# Patient Record
Sex: MALE | Race: WHITE | NOT HISPANIC OR LATINO | Employment: OTHER | URBAN - NONMETROPOLITAN AREA
[De-identification: names, ages, dates, MRNs, and addresses within clinical notes are randomized per-mention and may not be internally consistent; named-entity substitution may affect disease eponyms.]

---

## 2021-12-15 ENCOUNTER — OFFICE VISIT (OUTPATIENT)
Dept: OTOLARYNGOLOGY | Facility: CLINIC | Age: 71
End: 2021-12-15

## 2021-12-15 VITALS
HEART RATE: 86 BPM | SYSTOLIC BLOOD PRESSURE: 112 MMHG | WEIGHT: 167 LBS | TEMPERATURE: 98.9 F | DIASTOLIC BLOOD PRESSURE: 62 MMHG

## 2021-12-15 DIAGNOSIS — R63.4 ABNORMAL WEIGHT LOSS: ICD-10-CM

## 2021-12-15 DIAGNOSIS — C79.89 METASTATIC SQUAMOUS CELL CARCINOMA TO HEAD AND NECK (HCC): ICD-10-CM

## 2021-12-15 DIAGNOSIS — Z87.891 FORMER SMOKER: ICD-10-CM

## 2021-12-15 DIAGNOSIS — J34.2 ACQUIRED DEVIATED NASAL SEPTUM: ICD-10-CM

## 2021-12-15 DIAGNOSIS — C32.1 SQUAMOUS CELL CANCER OF EPIGLOTTIS (HCC): ICD-10-CM

## 2021-12-15 DIAGNOSIS — R22.1 MASS OF LEFT SIDE OF NECK: Primary | ICD-10-CM

## 2021-12-15 PROCEDURE — 88305 TISSUE EXAM BY PATHOLOGIST: CPT | Performed by: OTOLARYNGOLOGY

## 2021-12-15 PROCEDURE — 31575 DIAGNOSTIC LARYNGOSCOPY: CPT | Performed by: OTOLARYNGOLOGY

## 2021-12-15 PROCEDURE — 88112 CYTOPATH CELL ENHANCE TECH: CPT | Performed by: OTOLARYNGOLOGY

## 2021-12-15 PROCEDURE — 10021 FNA BX W/O IMG GDN 1ST LES: CPT | Performed by: OTOLARYNGOLOGY

## 2021-12-15 PROCEDURE — 99204 OFFICE O/P NEW MOD 45 MIN: CPT | Performed by: OTOLARYNGOLOGY

## 2021-12-15 RX ORDER — IPRATROPIUM BROMIDE 42 UG/1
2 SPRAY, METERED NASAL 4 TIMES DAILY PRN
Qty: 45 ML | Refills: 3 | Status: SHIPPED | OUTPATIENT
Start: 2021-12-15 | End: 2022-01-01

## 2021-12-15 NOTE — PROGRESS NOTES
Robert Ayers Jr, MD  [facility] EAR NOSE & THROAT  [address], [address]  [place] [facility] [address]  Fax [phone]  Phone [phone]      Visit Type: NEW PATIENT   Chief Complaint   Patient presents with   • Sinus Problem        HPI  New Patient  Accompanied by:  No one  Enrique Coronado is a  71 y.o. male with neck mass. He was referred. He has had sinus since 4/2021. He says he had drainage. He was unable to eat..  He has lost 60 lbs. He noted since May.  He says his nose bled as well.  He is unaware of neck mass.  Smoke- quit 7 months ago.  He has had CT at [facility].      No surgery found, No surgery found       History     Last Reviewed by Robert Ayers Jr., MD on 12/15/2021 at  2:57 PM    Sections Reviewed    Medical, Family, Tobacco, Surgical      Problem list reviewed by Robert Ayers Jr., MD on 12/15/2021 at  2:57 PM  Medicines reviewed by Robert Ayers Jr., MD on 12/15/2021 at  2:57 PM  Allergies reviewed by Robert Ayers Jr., MD on 12/15/2021 at  2:57 PM        Vital Signs:   Temp:  [98.9 °F (37.2 °C)] 98.9 °F (37.2 °C)  Heart Rate:  [86] 86  BP: (112)/(62) 112/62  ENT Physical Exam  Constitutional  Appearance: patient appears well-developed, well-nourished and well-groomed,  Communication/Voice: communication appropriate for developmental age; vocal quality normal;  Head and Face  Appearance: head appears normal, face appears normal and face appears atraumatic;  Palpation: facial palpation normal;  Salivary: glands normal;  Ear  Hearing: impaired to conversational voice;  Auricles: bilateral auricles normal;  External Mastoids: bilateral external mastoids normal;  Ear Canals: bilateral ear canals normal;  Tympanic Membranes: bilateral tympanic membranes normal;  Nose  External Nose: nares patent bilaterally; external nose normal;  Oral Cavity/Oropharynx  Lips: normal;  Teeth: missing teeth  (Edentulous upper and lower) and dentures (upper) noted;  Gums: gingiva normal;  Tongue: normal;  Oral mucosa: normal;  Hard palate: normal;  Soft palate: normal;  Tonsils: right tonsil fossa well-healed, bilateral tonsils absent,  Base of Tongue: normal; with no lesion present; Base of Tongue comments: palpation  Posterior pharyngeal wall: no lesion or mass noted;  Neck  Neck: neck normal;  Respiratory  Inspection: breathing unlabored; normal breathing rate;  Cardiovascular  Inspection: extremities are warm and well perfused; no peripheral edema present;  Lymphatic  Palpation: single left cervical lymph node present;  Lymphatic comments: Left Level 3 3 cm firm, immobile mass  Neurovestibular  Mental Status: alert and oriented;  Psychiatric: mood normal; affect is appropriate;  Cranial Nerves: cranial nerves intact;  Drawings       Laryngoscopy    Date/Time: 12/15/2021 2:48 PM  Performed by: Robert Ayers Jr., MD  Authorized by: Robert Ayers Jr., MD     Consent:     Consent obtained:  Verbal    Consent given by:  Patient  Anesthesia (see MAR for exact dosages):     Anesthesia method:  Topical application    Topical anesthetic:  Tetracaine  Procedure details:     Indications: hoarseness, dysphagia, or aspiration      Medication:  Afrin    Scope location: left nare    Sinus/ Nasopharynx:     Right nasopharynx: normal and patent      Left nasopharynx: normal and patent      Right eustachian tube: normal and patent      Left eustachian tube: normal and patent    Oropharynx/ Supraglottis:     Posterior pharyngeal wall: inflamed      Oropharynx: inflammation       comment:  Lateral walls redundant, red, mildly wet    Vallecula: inflammation       comment:  Pooling, erythema, questionable lesion in the left inferior vallecula adjacent to the epiglottis    Base of tongue: lingual tonsillar hypertrophy (Moderate, wet)       comment:  Prolapse, moderate    Epiglottis: lesions (Granulated lesion involving the  "lingual and laryngeal surface thickening the epiglottis, extending down the epiglottis into the petiole area and onto the anterior false cords)    Larynx/ Hypopharynx:     Arytenoids: inflammation (Red, wet, towers enlarged) and interarytenoid edema      Arytenoids: no lesions      Hypopharynx: inflammation (Lateral walls redundant, pooling of secretions)      Pyriform sinus: inflammation (Pooling of secretions)      False vocal cords: lesion (Bilateral very anterior vocal cord granulation from the lesion noted at the petiole of the epiglottis) and inflammation      True vocal cords: reduced mobility       comment:  Secretions penetrating the glottis  Post-procedure details:     Patient tolerance of procedure:  Tolerated well  Comments:      Epiglottic lesion on both services with edema of the epiglottis.    Fine needle aspiration    Date/Time: 12/17/2021 4:39 PM  Performed by: Robert Ayers Jr., MD  Authorized by: Robert Ayers Jr., MD   Consent: Verbal consent obtained. Written consent obtained.  Risks and benefits: risks, benefits and alternatives were discussed  Consent given by: patient  Site marked: the operative site was marked  Imaging studies: imaging studies available  Time out: Immediately prior to procedure a \"time out\" was called to verify the correct patient, procedure, equipment, support staff and site/side marked as required.  Preparation: Patient was prepped and draped in the usual sterile fashion.  Local anesthesia used: yes  Anesthesia: local infiltration    Anesthesia:  Local anesthesia used: yes  Local Anesthetic: lidocaine 1% with epinephrine  Anesthetic total: 2 mL    Sedation:  Patient sedated: no    Patient tolerance: patient tolerated the procedure well with no immediate complications  Comments: Fine-needle aspiration x2 passes in the left neck area  Findings of pus-like appearance of aspirate, 2 mL  Left neck mass much smaller after aspiration         Result Review  "   RESULTS REVIEW    I have reviewed the patients old records in the chart.   I have reviewed the patients old records in the chart.  The following results/records were reviewed:   REFERRAL/PRE-AUTH MRN - SCAN - ENT REFERRAL RECORDS (12/14/2021)  CT disc brought from [facility]- reviewed and agree with interpretation      Assessment/Plan    Diagnoses and all orders for this visit:    1. 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 Acquired deviated nasal septum  Comments:  R to L low moderate  L to R mid mod to severe    4. Abnormal weight loss    5. Former smoker    6. 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 For drainage  Dispense: 45 mL; Refill: 3       Medical and surgical options were discussed including medical and surgical options. Risks, benefits and alternatives were discussed and questions were answered. After considering the options, the patient decided to proceed with surgery.     -----SURGERY SCHEDULING:-----  Schedule DIRECT LARYNGOSCOPY, ESOPHAGOSCOPY, BRONCHOSCOPY (Bilateral), MICRODIRECT LARYNGOSCOPY WITH/WITHOUT LASER (Bilateral)    ---INFORMED CONSENT DISCUSSION:---  DIRECT LARYNGOSCOPY WITH BIOPSY: The risks and benefits were explained including  but not limited to pain, bleeding, infection, (including possible mediastinitis), the risks of the general anesthesia, pain, temporary or permanent hoarseness, airway loss, and/or tooth injury. Questions were answered. No guarantees were made or implied.      ---PREOPERATIVE WORKUP:---  labs/ workup per anesthesia  Preoperative Medicine evaluation for clearance- Dr Graham  Preoperative Anesthesia evaluation       Patient has an epiglottic lesion causing pooling and aspiration.  I fear this is cancer.  I discussed my concerns with the patient.  I feel like patient needs a biopsy and panendoscopy.  I have discussed risk, benefits, alternative treatments, options.  The patient appears to understand and wishes to proceed.  Plan endoscopy        Encouraged to enroll in My Chart  Encouraged to review data and findings in My Chart    Patient understands plan as described and wishes to proceed.  Return RTC 2 weeks after surgery, for Recheck throat.      Robert Ayers Jr, MD  12/15/21  15:04 CST

## 2021-12-15 NOTE — PATIENT INSTRUCTIONS
PREOPERATIVE SURGERY/PROCEDURE INSTRUCTIONS:  • Do not eat or drink ANYTHING after midnight, unless instructed   • Clean the operative site by showering with an antibacterial soap (like Dial, Dove, Ivory, etc) and shampooing hair  • Preoperative scrub for Surgery:   Skin: Antibacterial soap (Dial, Ivory, Dove) shower daily, including hair.  Be careful not to get into eyes  Do this daily for 5 days  • Mouth: Betadine solution 3 times daily for 5 days  • Do NOT pluck, shave hair on skin the night prior to operation  • If you are diabetic, take your blood sugar the night before and in the morning prior to coming to hospital and give results to nurse and the anesthesiologist    ENT PREOPERATIVE PROTOCOL FOR SURGERY: Begin after COVID test has been performed  After test performed, PLEASE self-quarantine to prevent possible infection!! And start below  Betadine rinses:  • Use Betadine rinse in the nose  • Spray twice in each nostril 3 times a day beginning 3 days before surgery  • Spray nose the morning of surgery, bring with you to the operating room  • Use Betadine rinse in the mouth  • Gargle, swish and spit 15 ml in mouth and rinse around teeth 3 times daily beginning 3 days prior to surgery  • Repeat morning of surgery before arriving at hospital  Betadine rinse can be prescribed at the [facility]    Betadine Iodine mixture Recipe:  • Neilmed bottle from pharmacy  • Use [employer] Med packet that comes with the bottle  • Add Betadine/Povidone 10 ml (2 tsp) to the bottle  • Add Remigio baby shampoo 2.5 ml (½ tsp) to the bottle  • Fill bottle with distilled water (from grocery store)    DO NOT USE IF IODINE/BETADINE ALLERGIC, OR HISTORY OF THYROID PROBLEMS/THYROID CANCER    Non Betadine rinses:  • Nasal rinses:  • Use rinse in the nose  • Spray twice in each nostril 3 times a day beginning 3 days before surgery  • Spray nose the morning of surgery, bring with you to the operating room  • Use Same rinse in  the mouth  • Gargle, swish and spit 15 ml in mouth and rinse around teeth 3 times daily beginning 3 days prior to surgery  • Repeat morning of surgery before arriving at hospital    Nasal mixture Recipe:  • Neilmed bottle from pharmacy  • Use Allan Med packet that comes with the bottle  • Add Remigio baby shampoo 2.5 ml (½ tsp) to the bottle  • Fill bottle with distilled water (from grocery store)    Remove any metallic piercings prior to surgery. You may wear plastic spacers if needed.    Do NOT apply eye makeup Morning of surgery    Please remove fingernail polish prior to surgery    STOP:  -   All natural/homeopathic medications 2 weeks prior to surgery, Ask about over the counter medications  -   Smoking 2 weeks prior to surgery  -   Blood thinners- 3-5 days prior to surgery (or as instructed by doctor)  Bring with you the morning of surgery:  -   Preoperative paperwork  -   Insurance card  -   Identification with photo  -   Home medications or up to date list    Robert Ayers Jr, MD has explained the risks, benefits and alternatives to the patient/patient’s representative, in clear and simple language.  Time was allowed for questions.  Risks of procedure include but are not limited to:    As a result of this procedure being performed, the material risks generally recognized are INFECTION, ALLERGIC REACTION, SEVERE LOSS OF BLOOD, LOSS OR LOSS OF FUNCTION OF ANY LIMB OR ORGAN, PARALYSIS OR PARTIAL PARALYSIS, PARAPLEGIA OR QUADRIPLEGIA, DISFIGURING SCAR, BRAIN DAMAGE, CARDIAC ARREST OR DEATH, BLOOD LOSS NECESSITATING TRANSFUSION WHICH CARRIES THE RISK OF EXPOSURE TO AIDS, HEPATITIS OR OTHER INFECTIOUS DISEASES.      Procedure: Direct laryngoscopy, Esophagoscopy, Bronchoscopy, Microlaryngoscopy, Possible laser use (any or all of the above)    Risks specific for procedure: Perforation of the upper and lower aerodigestive tract including esophagus and trachea, loss of airway, permanent damage to voice and/or  swallowing, loss of voice, need for tracheostomy, fire in the airway, scarring and stenosis    No guarantees of outcome given or implied  Patient demonstrate understanding    Ipratromium Bromide (Atrovent) spray use 1-2 puffs each nostril 3-4 times daily AS needed for drainage    Patient does wish to  proceed with proposed procedure    CONTACT INFORMATION:  The main office phone number is [phone]. For emergencies after hours and on weekends, this number will convert over to our answering service and the on call provider will answer. Please try to keep non emergent phone calls/ questions to office hours 9am-5pm Monday through Friday.     [employer]  As an alternative, you can sign up and use the Epic MyChart system for more direct and quicker access for non emergent questions/ problems.  [employer] allows you to send messages to your doctor, view your test results, renew your prescriptions, schedule appointments, and more. To sign up, go to [employer] and click on the Sign Up Now link in the New User? box. Enter your Private[employer] Activation Code exactly as it appears below along with the last four digits of your Social Security Number and your Date of Birth () to complete the sign-up process. If you do not sign up before the expiration date, you must request a new code.    Private[employer] Activation Code: [email or web address]  Expires: 2022  7:15 AM    If you have questions, you can email [email or web address] or call [phone] to talk to our [employer] staff. Remember, Private[employer] is NOT to be used for urgent needs. For medical emergencies, dial 911.

## 2021-12-16 ENCOUNTER — TELEPHONE (OUTPATIENT)
Dept: OTOLARYNGOLOGY | Facility: CLINIC | Age: 71
End: 2021-12-16

## 2021-12-16 NOTE — TELEPHONE ENCOUNTER
I spoke to patient's doctor's office, Odessa Graham about scheduling patient for a pre op clearance for a Pandoscopy for Dr. Ayers. Their office will contact the patient to schedule an appointment. I have faxed paperwork for the clearance to their office.

## 2021-12-20 PROBLEM — C32.1 SQUAMOUS CELL CANCER OF EPIGLOTTIS (HCC): Status: ACTIVE | Noted: 2021-12-20

## 2021-12-20 PROBLEM — R22.1 MASS OF LEFT SIDE OF NECK: Status: ACTIVE | Noted: 2021-12-20

## 2021-12-20 LAB
CYTO UR: NORMAL
LAB AP CASE REPORT: NORMAL
LAB AP CLINICAL INFORMATION: NORMAL
PATH REPORT.FINAL DX SPEC: NORMAL
PATH REPORT.GROSS SPEC: NORMAL

## 2021-12-23 ENCOUNTER — PRE-ADMISSION TESTING (OUTPATIENT)
Dept: PREADMISSION TESTING | Facility: HOSPITAL | Age: 71
End: 2021-12-23

## 2021-12-23 ENCOUNTER — LAB (OUTPATIENT)
Dept: LAB | Facility: HOSPITAL | Age: 71
End: 2021-12-23

## 2021-12-23 VITALS
OXYGEN SATURATION: 97 % | SYSTOLIC BLOOD PRESSURE: 135 MMHG | WEIGHT: 164.68 LBS | BODY MASS INDEX: 24.39 KG/M2 | HEIGHT: 69 IN | RESPIRATION RATE: 16 BRPM | DIASTOLIC BLOOD PRESSURE: 73 MMHG | HEART RATE: 80 BPM

## 2021-12-23 DIAGNOSIS — R22.1 MASS OF LEFT SIDE OF NECK: ICD-10-CM

## 2021-12-23 DIAGNOSIS — C32.1 SQUAMOUS CELL CANCER OF EPIGLOTTIS (HCC): ICD-10-CM

## 2021-12-23 LAB
DEPRECATED RDW RBC AUTO: 51.9 FL (ref 37–54)
ERYTHROCYTE [DISTWIDTH] IN BLOOD BY AUTOMATED COUNT: 15.5 % (ref 12.3–15.4)
HCT VFR BLD AUTO: 47.8 % (ref 37.5–51)
HGB BLD-MCNC: 15.4 G/DL (ref 13–17.7)
MCH RBC QN AUTO: 29.2 PG (ref 26.6–33)
MCHC RBC AUTO-ENTMCNC: 32.2 G/DL (ref 31.5–35.7)
MCV RBC AUTO: 90.5 FL (ref 79–97)
PLATELET # BLD AUTO: 194 10*3/MM3 (ref 140–450)
PMV BLD AUTO: 10.1 FL (ref 6–12)
RBC # BLD AUTO: 5.28 10*6/MM3 (ref 4.14–5.8)
SARS-COV-2 ORF1AB RESP QL NAA+PROBE: NOT DETECTED
WBC NRBC COR # BLD: 8.11 10*3/MM3 (ref 3.4–10.8)

## 2021-12-23 PROCEDURE — U0004 COV-19 TEST NON-CDC HGH THRU: HCPCS

## 2021-12-23 PROCEDURE — 36415 COLL VENOUS BLD VENIPUNCTURE: CPT

## 2021-12-23 PROCEDURE — U0005 INFEC AGEN DETEC AMPLI PROBE: HCPCS

## 2021-12-23 PROCEDURE — 85027 COMPLETE CBC AUTOMATED: CPT

## 2021-12-23 PROCEDURE — C9803 HOPD COVID-19 SPEC COLLECT: HCPCS

## 2021-12-23 NOTE — DISCHARGE INSTRUCTIONS
DAY OF SURGERY INSTRUCTIONS          ARRIVAL TIME: AS DIRECTED BY OFFICE    YOU MAY TAKE THE FOLLOWING MEDICATION(S) THE MORNING OF SURGERY WITH A SIP OF WATER: NONE    ALL OTHER HOME MEDICATIONS CHECK WITH YOUR DOCTOR    DO NOT TAKE ANY ERECTILE DYSFUNCTION MEDICATIONS (EX:  CIALIS, VIAGRA) 24 HOURS PRIOR TO SURGERY              MANAGING PAIN AFTER SURGERY    We know you are probably wondering what your pain will be like after surgery.  Following surgery it is unrealistic to expect you will not have pain.   Pain is how our bodies let us know that something is wrong or cautions us to be careful.  That said, our goal is to make your pain tolerable.    Methods we may use to treat your pain include (oral or IV medications, PCAs, epidurals, nerve blocks, etc.)   While some procedures require IV pain medications for a short time after surgery, transitioning to pain medications by mouth allows for better management of pain.   Your nurse will encourage you to take oral pain medications whenever possible.  IV medications work almost immediately, but only last a short while.  Taking medications by mouth allows for a more constant level of medication in your blood stream for a longer period of time.      Once your pain is out of control it is harder to get back under control.  It is important you are aware when your next dose of pain medication is due.  If you are admitted, your nurse may write the time of your next dose on the white board in your room to help you remember.      We are interested in your pain and encourage you to inform us about aggravating factors during your visit.   Many times a simple repositioning every few hours can make a big difference.    If your physician says it is okay, do not let your pain prevent you from getting out of bed. Be sure to call your nurse for assistance prior to getting up so you do not fall.      Before surgery, please decide your tolerable pain goal.  These faces help describe the  pain ratings we use on a 0-10 scale.   Be prepared to tell us your goal and whether or not you take pain or anxiety medications at home.      BEFORE YOU COME TO THE HOSPITAL  (Pre-op instructions)  • Do not eat, drink, smoke or chew gum after midnight the night before surgery.  This also includes no mints.  • Morning of surgery take only the medicines you have been instructed with a sip of water unless otherwise instructed  by your physician.  • Do not shave, wear makeup or dark nail polish.  • Remove all jewelry including rings.  • Leave anything you consider valuable at home.  • Leave your suitcase in the car until after your surgery.  • Bring the following with you if applicable:  o Picture ID and insurance, Medicare or Medicaid cards  o Co-pay/deductible required by insurance (cash, check, credit card)  o Copy of advance directive, living will or power-of- documents if not brought to Pre-work  o CPAP or BIPAP mask and tubing  o Relaxation aids ( book, magazine), etc.  o Hearing aids                                 ON THE DAY OF SURGERY  · On the day of surgery check in at registration located at the main entrance of the hospital. Only one family member or friend are allowed per patient.  ? You will be registered and given a beeper with instructions where to wait in the main lobby.  ? When your beeper lights up and vibrates a member of the Outpatient Surgery staff will meet you at the double doors under the stair steps and escort you to your preoperative room.   · You may have cloth compression devices placed on your legs. These help to prevent blood clots and reduce swelling in your legs.  · An IV may be inserted into one of your veins.  · In the operating room, you may be given one or more of the following:  ? A medicine to help you relax (sedative).  ? A medicine to numb the area (local anesthetic).  ? A medicine to make you fall asleep (general anesthetic).  ? A medicine that is injected into an area  "of your body to numb everything below the injection site (regional anesthetic).  · Your surgical site will be marked or identified.  · You may be given an antibiotic through your IV to help prevent infection.  Contact a health care provider if you:  · Develop a fever of more than 100.4°F (38°C) or other feelings of illness during the 48 hours before your surgery.  · Have symptoms that get worse.  Have questions or concerns about your surgery    General Anesthesia/Surgery, Adult  General anesthesia is the use of medicines to make a person \"go to sleep\" (unconscious) for a medical procedure. General anesthesia must be used for certain procedures, and is often recommended for procedures that:  · Last a long time.  · Require you to be still or in an unusual position.  · Are major and can cause blood loss.  The medicines used for general anesthesia are called general anesthetics. As well as making you unconscious for a certain amount of time, these medicines:  · Prevent pain.  · Control your blood pressure.  · Relax your muscles.  Tell a health care provider about:  · Any allergies you have.  · All medicines you are taking, including vitamins, herbs, eye drops, creams, and over-the-counter medicines.  · Any problems you or family members have had with anesthetic medicines.  · Types of anesthetics you have had in the past.  · Any blood disorders you have.  · Any surgeries you have had.  · Any medical conditions you have.  · Any recent upper respiratory, chest, or ear infections.  · Any history of:  ? Heart or lung conditions, such as heart failure, sleep apnea, asthma, or chronic obstructive pulmonary disease (COPD).  ?  service.  ? Depression or anxiety.  · Any tobacco or drug use, including marijuana or alcohol use.  · Whether you are pregnant or may be pregnant.  What are the risks?  Generally, this is a safe procedure. However, problems may occur, including:  · Allergic reaction.  · Lung and heart " problems.  · Inhaling food or liquid from the stomach into the lungs (aspiration).  · Nerve injury.  · Air in the bloodstream, which can lead to stroke.  · Extreme agitation or confusion (delirium) when you wake up from the anesthetic.  · Waking up during your procedure and being unable to move. This is rare.  These problems are more likely to develop if you are having a major surgery or if you have an advanced or serious medical condition. You can prevent some of these complications by answering all of your health care provider's questions thoroughly and by following all instructions before your procedure.  General anesthesia can cause side effects, including:  · Nausea or vomiting.  · A sore throat from the breathing tube.  · Hoarseness.  · Wheezing or coughing.  · Shaking chills.  · Tiredness.  · Body aches.  · Anxiety.  · Sleepiness or drowsiness.  · Confusion or agitation.  RISKS AND COMPLICATIONS OF SURGERY  Your health care provider will discuss possible risks and complications with you before surgery. Common risks and complications include:    · Problems due to the use of anesthetics.  · Blood loss and replacement (does not apply to minor surgical procedures).  · Temporary increase in pain due to surgery.  · Uncorrected pain or problems that the surgery was meant to correct.  · Infection.  · New damage.    What happens before the procedure?    Medicines  Ask your health care provider about:  · Changing or stopping your regular medicines. This is especially important if you are taking diabetes medicines or blood thinners.  · Taking medicines such as aspirin and ibuprofen. These medicines can thin your blood. Do not take these medicines unless your health care provider tells you to take them.  · Taking over-the-counter medicines, vitamins, herbs, and supplements. Do not take these during the week before your procedure unless your health care provider approves them.  General instructions  · Starting 3-6 weeks  before the procedure, do not use any products that contain nicotine or tobacco, such as cigarettes and e-cigarettes. If you need help quitting, ask your health care provider.  · If you brush your teeth on the morning of the procedure, make sure to spit out all of the toothpaste.  · Tell your health care provider if you become ill or develop a cold, cough, or fever.  · If instructed by your health care provider, bring your sleep apnea device with you on the day of your surgery (if applicable).  · Ask your health care provider if you will be going home the same day, the following day, or after a longer hospital stay.  ? Plan to have someone take you home from the hospital or clinic.  ? Plan to have a responsible adult care for you for at least 24 hours after you leave the hospital or clinic. This is important.  What happens during the procedure?  · You will be given anesthetics through both of the following:  ? A mask placed over your nose and mouth.  ? An IV in one of your veins.  · You may receive a medicine to help you relax (sedative).  · After you are unconscious, a breathing tube may be inserted down your throat to help you breathe. This will be removed before you wake up.  · An anesthesia specialist will stay with you throughout your procedure. He or she will:  ? Keep you comfortable and safe by continuing to give you medicines and adjusting the amount of medicine that you get.  ? Monitor your blood pressure, pulse, and oxygen levels to make sure that the anesthetics do not cause any problems.  The procedure may vary among health care providers and hospitals.  What happens after the procedure?  · Your blood pressure, temperature, heart rate, breathing rate, and blood oxygen level will be monitored until the medicines you were given have worn off.  · You will wake up in a recovery area. You may wake up slowly.  · If you feel anxious or agitated, you may be given medicine to help you calm down.  · If you will be  going home the same day, your health care provider may check to make sure you can walk, drink, and urinate.  · Your health care provider will treat any pain or side effects you have before you go home.  · Do not drive for 24 hours if you were given a sedative.  Summary  · General anesthesia is used to keep you still and prevent pain during a procedure.  · It is important to tell your healthcare provider about your medical history and any surgeries you have had, and previous experience with anesthesia.  · Follow your healthcare provider’s instructions about when to stop eating, drinking, or taking certain medicines before your procedure.  · Plan to have someone take you home from the hospital or clinic.  This information is not intended to replace advice given to you by your health care provider. Make sure you discuss any questions you have with your health care provider.  Document Released: 03/26/2009 Document Revised: 08/03/2018 Document Reviewed: 08/03/2018  [employer] Interactive Patient Education © 2019 [employer]      Fall Prevention in Hospitals, Adult  As a hospital patient, your condition and the treatments you receive can increase your risk for falls. Some additional risk factors for falls in a hospital include:  · Being in an unfamiliar environment.  · Being on bed rest.  · Your surgery.  · Taking certain medicines.  · Your tubing requirements, such as intravenous (IV) therapy or catheters.  It is important that you learn how to decrease fall risks while at the hospital. Below are important tips that can help prevent falls.  SAFETY TIPS FOR PREVENTING FALLS  Talk about your risk of falling.  · Ask your health care provider why you are at risk for falling. Is it your medicine, illness, tubing placement, or something else?  · Make a plan with your health care provider to keep you safe from falls.  · Ask your health care provider or pharmacist about side effects of your medicines. Some medicines can make you  dizzy or affect your coordination.  Ask for help.  · Ask for help before getting out of bed. You may need to press your call button.  · Ask for assistance in getting safely to the toilet.  · Ask for a walker or cane to be put at your bedside. Ask that most of the side rails on your bed be placed up before your health care provider leaves the room.  · Ask family or friends to sit with you.  · Ask for things that are out of your reach, such as your glasses, hearing aids, telephone, bedside table, or call button.  Follow these tips to avoid falling:  · Stay lying or seated, rather than standing, while waiting for help.  · Wear rubber-soled slippers or shoes whenever you walk in the hospital.  · Avoid quick, sudden movements.  ¨ Change positions slowly.  ¨ Sit on the side of your bed before standing.  ¨ Stand up slowly and wait before you start to walk.  · Let your health care provider know if there is a spill on the floor.  · Pay careful attention to the medical equipment, electrical cords, and tubes around you.  · When you need help, use your call button by your bed or in the bathroom. Wait for one of your health care providers to help you.  · If you feel dizzy or unsure of your footing, return to bed and wait for assistance.  · Avoid being distracted by the TV, telephone, or another person in your room.  · Do not lean or support yourself on rolling objects, such as IV poles or bedside tables.     This information is not intended to replace advice given to you by your health care provider. Make sure you discuss any questions you have with your health care provider.     Document Released: 12/15/2001 Document Revised: 01/08/2016 Document Reviewed: 08/25/2013  [employer] Interactive Patient Education ©2016 [employer]            PATIENT/FAMILY/RESPONSIBLE PARTY VERBALIZES UNDERSTANDING OF ABOVE EDUCATION.  COPY OF PAIN SCALE GIVEN AND REVIEWED WITH VERBALIZED UNDERSTANDING.

## 2021-12-26 ENCOUNTER — ANESTHESIA EVENT (OUTPATIENT)
Dept: PERIOP | Facility: HOSPITAL | Age: 71
End: 2021-12-26

## 2021-12-27 ENCOUNTER — ANESTHESIA (OUTPATIENT)
Dept: PERIOP | Facility: HOSPITAL | Age: 71
End: 2021-12-27

## 2021-12-27 ENCOUNTER — HOSPITAL ENCOUNTER (OUTPATIENT)
Facility: HOSPITAL | Age: 71
Setting detail: HOSPITAL OUTPATIENT SURGERY
Discharge: HOME | End: 2021-12-27
Attending: OTOLARYNGOLOGY | Admitting: OTOLARYNGOLOGY

## 2021-12-27 VITALS
RESPIRATION RATE: 16 BRPM | HEART RATE: 60 BPM | DIASTOLIC BLOOD PRESSURE: 63 MMHG | TEMPERATURE: 98.5 F | SYSTOLIC BLOOD PRESSURE: 120 MMHG | OXYGEN SATURATION: 96 %

## 2021-12-27 DIAGNOSIS — R22.1 MASS OF LEFT SIDE OF NECK: ICD-10-CM

## 2021-12-27 DIAGNOSIS — C32.1 SQUAMOUS CELL CANCER OF EPIGLOTTIS: ICD-10-CM

## 2021-12-27 LAB
ANION GAP SERPL CALCULATED.3IONS-SCNC: 6 MMOL/L (ref 5–15)
BUN SERPL-MCNC: 13 MG/DL (ref 8–23)
BUN/CREAT SERPL: 18.3 (ref 7–25)
CALCIUM SPEC-SCNC: 8.8 MG/DL (ref 8.6–10.5)
CHLORIDE SERPL-SCNC: 104 MMOL/L (ref 98–107)
CO2 SERPL-SCNC: 30 MMOL/L (ref 22–29)
CREAT SERPL-MCNC: 0.71 MG/DL (ref 0.76–1.27)
GFR SERPL CREATININE-BSD FRML MDRD: 110 ML/MIN/1.73
GLUCOSE SERPL-MCNC: 107 MG/DL (ref 65–99)
POTASSIUM SERPL-SCNC: 4.5 MMOL/L (ref 3.5–5.2)
SODIUM SERPL-SCNC: 140 MMOL/L (ref 136–145)

## 2021-12-27 PROCEDURE — 25010000002 ONDANSETRON PER 1 MG: Performed by: NURSE ANESTHETIST, CERTIFIED REGISTERED

## 2021-12-27 PROCEDURE — 80048 BASIC METABOLIC PNL TOTAL CA: CPT | Performed by: OTOLARYNGOLOGY

## 2021-12-27 PROCEDURE — 31536 LARYNGOSCOPY W/BX & OP SCOPE: CPT | Performed by: OTOLARYNGOLOGY

## 2021-12-27 PROCEDURE — 43191 ESOPHAGOSCOPY RIGID TRNSO DX: CPT | Performed by: OTOLARYNGOLOGY

## 2021-12-27 PROCEDURE — 25010000002 PROPOFOL 10 MG/ML EMULSION: Performed by: NURSE ANESTHETIST, CERTIFIED REGISTERED

## 2021-12-27 PROCEDURE — 25010000002 DEXAMETHASONE PER 1 MG: Performed by: NURSE ANESTHETIST, CERTIFIED REGISTERED

## 2021-12-27 PROCEDURE — 93010 ELECTROCARDIOGRAM REPORT: CPT | Performed by: INTERNAL MEDICINE

## 2021-12-27 PROCEDURE — 88342 IMHCHEM/IMCYTCHM 1ST ANTB: CPT | Performed by: OTOLARYNGOLOGY

## 2021-12-27 PROCEDURE — 93005 ELECTROCARDIOGRAM TRACING: CPT | Performed by: OTOLARYNGOLOGY

## 2021-12-27 PROCEDURE — 88305 TISSUE EXAM BY PATHOLOGIST: CPT | Performed by: OTOLARYNGOLOGY

## 2021-12-27 PROCEDURE — 31622 DX BRONCHOSCOPE/WASH: CPT | Performed by: OTOLARYNGOLOGY

## 2021-12-27 RX ORDER — PROPOFOL 10 MG/ML
VIAL (ML) INTRAVENOUS AS NEEDED
Status: DISCONTINUED | OUTPATIENT
Start: 2021-12-27 | End: 2021-12-27 | Stop reason: SURG

## 2021-12-27 RX ORDER — ONDANSETRON 2 MG/ML
INJECTION INTRAMUSCULAR; INTRAVENOUS AS NEEDED
Status: DISCONTINUED | OUTPATIENT
Start: 2021-12-27 | End: 2021-12-27 | Stop reason: SURG

## 2021-12-27 RX ORDER — LIDOCAINE HYDROCHLORIDE 10 MG/ML
0.5 INJECTION, SOLUTION EPIDURAL; INFILTRATION; INTRACAUDAL; PERINEURAL ONCE AS NEEDED
Status: DISCONTINUED | OUTPATIENT
Start: 2021-12-27 | End: 2021-12-27 | Stop reason: HOSPADM

## 2021-12-27 RX ORDER — LABETALOL HYDROCHLORIDE 5 MG/ML
5 INJECTION, SOLUTION INTRAVENOUS
Status: DISCONTINUED | OUTPATIENT
Start: 2021-12-27 | End: 2021-12-27 | Stop reason: HOSPADM

## 2021-12-27 RX ORDER — SODIUM CHLORIDE 0.9 % (FLUSH) 0.9 %
3-10 SYRINGE (ML) INJECTION AS NEEDED
Status: DISCONTINUED | OUTPATIENT
Start: 2021-12-27 | End: 2021-12-27 | Stop reason: HOSPADM

## 2021-12-27 RX ORDER — ROCURONIUM BROMIDE 10 MG/ML
INJECTION, SOLUTION INTRAVENOUS AS NEEDED
Status: DISCONTINUED | OUTPATIENT
Start: 2021-12-27 | End: 2021-12-27 | Stop reason: SURG

## 2021-12-27 RX ORDER — SUFENTANIL CITRATE 50 UG/ML
INJECTION EPIDURAL; INTRAVENOUS AS NEEDED
Status: DISCONTINUED | OUTPATIENT
Start: 2021-12-27 | End: 2021-12-27 | Stop reason: SURG

## 2021-12-27 RX ORDER — FLUMAZENIL 0.1 MG/ML
0.2 INJECTION INTRAVENOUS AS NEEDED
Status: DISCONTINUED | OUTPATIENT
Start: 2021-12-27 | End: 2021-12-27 | Stop reason: HOSPADM

## 2021-12-27 RX ORDER — DEXAMETHASONE SODIUM PHOSPHATE 4 MG/ML
4 INJECTION, SOLUTION INTRA-ARTICULAR; INTRALESIONAL; INTRAMUSCULAR; INTRAVENOUS; SOFT TISSUE ONCE AS NEEDED
Status: DISCONTINUED | OUTPATIENT
Start: 2021-12-27 | End: 2021-12-27 | Stop reason: HOSPADM

## 2021-12-27 RX ORDER — FENTANYL CITRATE 50 UG/ML
25 INJECTION, SOLUTION INTRAMUSCULAR; INTRAVENOUS
Status: DISCONTINUED | OUTPATIENT
Start: 2021-12-27 | End: 2021-12-27 | Stop reason: HOSPADM

## 2021-12-27 RX ORDER — NALOXONE HCL 0.4 MG/ML
0.4 VIAL (ML) INJECTION AS NEEDED
Status: DISCONTINUED | OUTPATIENT
Start: 2021-12-27 | End: 2021-12-27 | Stop reason: HOSPADM

## 2021-12-27 RX ORDER — IBUPROFEN 200 MG
600 TABLET ORAL EVERY 6 HOURS PRN
Status: DISCONTINUED | OUTPATIENT
Start: 2021-12-27 | End: 2021-12-27 | Stop reason: HOSPADM

## 2021-12-27 RX ORDER — SODIUM CHLORIDE, SODIUM LACTATE, POTASSIUM CHLORIDE, CALCIUM CHLORIDE 600; 310; 30; 20 MG/100ML; MG/100ML; MG/100ML; MG/100ML
1000 INJECTION, SOLUTION INTRAVENOUS CONTINUOUS
Status: DISCONTINUED | OUTPATIENT
Start: 2021-12-27 | End: 2021-12-27 | Stop reason: HOSPADM

## 2021-12-27 RX ORDER — MAGNESIUM HYDROXIDE 1200 MG/15ML
LIQUID ORAL AS NEEDED
Status: DISCONTINUED | OUTPATIENT
Start: 2021-12-27 | End: 2021-12-27 | Stop reason: HOSPADM

## 2021-12-27 RX ORDER — NEOSTIGMINE METHYLSULFATE 5 MG/5 ML
SYRINGE (ML) INTRAVENOUS AS NEEDED
Status: DISCONTINUED | OUTPATIENT
Start: 2021-12-27 | End: 2021-12-27 | Stop reason: SURG

## 2021-12-27 RX ORDER — SODIUM CHLORIDE 0.9 % (FLUSH) 0.9 %
3 SYRINGE (ML) INJECTION AS NEEDED
Status: DISCONTINUED | OUTPATIENT
Start: 2021-12-27 | End: 2021-12-27 | Stop reason: HOSPADM

## 2021-12-27 RX ORDER — SODIUM CHLORIDE, SODIUM LACTATE, POTASSIUM CHLORIDE, CALCIUM CHLORIDE 600; 310; 30; 20 MG/100ML; MG/100ML; MG/100ML; MG/100ML
100 INJECTION, SOLUTION INTRAVENOUS CONTINUOUS
Status: DISCONTINUED | OUTPATIENT
Start: 2021-12-27 | End: 2021-12-27 | Stop reason: HOSPADM

## 2021-12-27 RX ORDER — ACETAMINOPHEN 325 MG/1
650 TABLET ORAL ONCE
Status: DISCONTINUED | OUTPATIENT
Start: 2021-12-27 | End: 2021-12-27 | Stop reason: HOSPADM

## 2021-12-27 RX ORDER — HYDROCODONE BITARTRATE AND ACETAMINOPHEN 5; 325 MG/1; MG/1
1-2 TABLET ORAL EVERY 4 HOURS PRN
Qty: 20 TABLET | Refills: 0 | Status: SHIPPED | OUTPATIENT
Start: 2021-12-27 | End: 2021-12-30

## 2021-12-27 RX ORDER — ONDANSETRON 2 MG/ML
4 INJECTION INTRAMUSCULAR; INTRAVENOUS ONCE AS NEEDED
Status: DISCONTINUED | OUTPATIENT
Start: 2021-12-27 | End: 2021-12-27 | Stop reason: HOSPADM

## 2021-12-27 RX ORDER — LIDOCAINE HYDROCHLORIDE 40 MG/ML
SOLUTION TOPICAL AS NEEDED
Status: DISCONTINUED | OUTPATIENT
Start: 2021-12-27 | End: 2021-12-27 | Stop reason: SURG

## 2021-12-27 RX ORDER — LIDOCAINE HYDROCHLORIDE 20 MG/ML
INJECTION, SOLUTION EPIDURAL; INFILTRATION; INTRACAUDAL; PERINEURAL AS NEEDED
Status: DISCONTINUED | OUTPATIENT
Start: 2021-12-27 | End: 2021-12-27 | Stop reason: SURG

## 2021-12-27 RX ORDER — OXYMETAZOLINE HYDROCHLORIDE 0.05 G/100ML
SPRAY NASAL AS NEEDED
Status: DISCONTINUED | OUTPATIENT
Start: 2021-12-27 | End: 2021-12-27 | Stop reason: HOSPADM

## 2021-12-27 RX ORDER — SODIUM CHLORIDE 0.9 % (FLUSH) 0.9 %
3 SYRINGE (ML) INJECTION EVERY 12 HOURS SCHEDULED
Status: DISCONTINUED | OUTPATIENT
Start: 2021-12-27 | End: 2021-12-27 | Stop reason: HOSPADM

## 2021-12-27 RX ORDER — DEXAMETHASONE SODIUM PHOSPHATE 4 MG/ML
INJECTION, SOLUTION INTRA-ARTICULAR; INTRALESIONAL; INTRAMUSCULAR; INTRAVENOUS; SOFT TISSUE AS NEEDED
Status: DISCONTINUED | OUTPATIENT
Start: 2021-12-27 | End: 2021-12-27 | Stop reason: SURG

## 2021-12-27 RX ADMIN — ROCURONIUM BROMIDE 20 MG: 10 INJECTION INTRAVENOUS at 10:46

## 2021-12-27 RX ADMIN — GLYCOPYRROLATE 0.4 MG: 0.2 INJECTION, SOLUTION INTRAMUSCULAR; INTRAVENOUS at 11:32

## 2021-12-27 RX ADMIN — SODIUM CHLORIDE, POTASSIUM CHLORIDE, SODIUM LACTATE AND CALCIUM CHLORIDE: 600; 310; 30; 20 INJECTION, SOLUTION INTRAVENOUS at 11:11

## 2021-12-27 RX ADMIN — SODIUM CHLORIDE, POTASSIUM CHLORIDE, SODIUM LACTATE AND CALCIUM CHLORIDE 1000 ML: 600; 310; 30; 20 INJECTION, SOLUTION INTRAVENOUS at 08:44

## 2021-12-27 RX ADMIN — DEXAMETHASONE SODIUM PHOSPHATE 8 MG: 4 INJECTION, SOLUTION INTRA-ARTICULAR; INTRALESIONAL; INTRAMUSCULAR; INTRAVENOUS; SOFT TISSUE at 11:12

## 2021-12-27 RX ADMIN — Medication 3 MG: at 11:32

## 2021-12-27 RX ADMIN — SUFENTANIL CITRATE 20 MCG: 50 INJECTION EPIDURAL; INTRAVENOUS at 10:50

## 2021-12-27 RX ADMIN — PROPOFOL 120 MG: 10 INJECTION, EMULSION INTRAVENOUS at 10:46

## 2021-12-27 RX ADMIN — SUFENTANIL CITRATE 5 MCG: 50 INJECTION EPIDURAL; INTRAVENOUS at 11:18

## 2021-12-27 RX ADMIN — LIDOCAINE HYDROCHLORIDE 100 MG: 20 INJECTION, SOLUTION EPIDURAL; INFILTRATION; INTRACAUDAL; PERINEURAL at 10:46

## 2021-12-27 RX ADMIN — ONDANSETRON 4 MG: 2 INJECTION INTRAMUSCULAR; INTRAVENOUS at 11:12

## 2021-12-27 RX ADMIN — LIDOCAINE HYDROCHLORIDE 1 EACH: 40 SOLUTION TOPICAL at 10:49

## 2021-12-27 NOTE — ANESTHESIA PROCEDURE NOTES
Airway  Urgency: elective    Date/Time: 12/27/2021 10:49 AM  Airway not difficult    General Information and Staff    Patient location during procedure: OR  CRNA: Paulo Brooks CRNA    Indications and Patient Condition  Indications for airway management: airway protection    Preoxygenated: yes  Mask difficulty assessment: 1 - vent by mask    Final Airway Details  Final airway type: endotracheal airway      Successful airway: ETT  Cuffed: yes   Successful intubation technique: direct laryngoscopy  Endotracheal tube insertion site: oral  Blade: Harvey  Blade size: 2  ETT size (mm): 6.0  Cormack-Lehane Classification: grade I - full view of glottis  Placement verified by: capnometry   Measured from: lips  ETT/EBT  to lips (cm): 23  Number of attempts at approach: 1  Assessment: lips, teeth, and gum same as pre-op and atraumatic intubation

## 2021-12-27 NOTE — ANESTHESIA PREPROCEDURE EVALUATION
Anesthesia Evaluation     Patient summary reviewed   no history of anesthetic complications:  NPO Solid Status: > 8 hours  NPO Liquid Status: > 8 hours           Airway   Mallampati: II  TM distance: >3 FB  Neck ROM: full  Dental    (+) edentulous    Pulmonary    (+) a smoker Former,   (-) asthma, sleep apnea  Cardiovascular   Exercise tolerance: good (4-7 METS)    ECG reviewed    (-) hypertension, hyperlipidemia      Neuro/Psych  (-) seizures, TIA, CVA  GI/Hepatic/Renal/Endo    (-) liver disease, no renal disease, diabetes    Musculoskeletal     Abdominal    Substance History      OB/GYN          Other      history of cancer (metastatic SCC of epiglottis)                    Anesthesia Plan    ASA 2     general   (Flexible scope reviewed with pharyngeal inflammation and epiglottic mass noted. Vocal cords with reduce mobility. Have video scope available for intubation)  intravenous induction     Anesthetic plan, all risks, benefits, and alternatives have been provided, discussed and informed consent has been obtained with: patient.

## 2021-12-27 NOTE — ANESTHESIA POSTPROCEDURE EVALUATION
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        Post Anesthesia Care and Evaluation    Patient location during evaluation: PACU  Patient participation: complete - patient participated  Level of consciousness: awake and alert  Pain management: adequate  Airway patency: patent  Anesthetic complications: No anesthetic complications    Cardiovascular status: acceptable  Respiratory status: acceptable  Hydration status: acceptable    Comments: Blood pressure 120/63, pulse 60, temperature 98.5 °F (36.9 °C), temperature source Temporal, resp. rate 16, SpO2 96 %.    Pt discharged from PACU based on linda score >8

## 2021-12-28 LAB
QT INTERVAL: 398 MS
QTC INTERVAL: 403 MS

## 2021-12-30 LAB
CYTO UR: NORMAL
LAB AP CASE REPORT: NORMAL
LAB AP DIAGNOSIS COMMENT: NORMAL
LAB AP INTRADEPARTMENTAL CONSULT: NORMAL
PATH REPORT.FINAL DX SPEC: NORMAL
PATH REPORT.GROSS SPEC: NORMAL

## 2022-01-01 ENCOUNTER — LAB (OUTPATIENT)
Dept: LAB | Facility: HOSPITAL | Age: 72
End: 2022-01-01

## 2022-01-01 ENCOUNTER — HOSPITAL ENCOUNTER (OUTPATIENT)
Dept: RADIATION ONCOLOGY | Facility: HOSPITAL | Age: 72
Setting detail: RADIATION/ONCOLOGY SERIES
Discharge: HOME | End: 2022-01-21

## 2022-01-01 ENCOUNTER — HOSPITAL ENCOUNTER (OUTPATIENT)
Dept: RADIATION ONCOLOGY | Facility: HOSPITAL | Age: 72
Setting detail: RADIATION/ONCOLOGY SERIES
End: 2022-01-01

## 2022-01-01 ENCOUNTER — OFFICE VISIT (OUTPATIENT)
Dept: SPEECH THERAPY | Facility: HOSPITAL | Age: 72
End: 2022-01-01

## 2022-01-01 ENCOUNTER — TREATMENT (OUTPATIENT)
Dept: PHYSICAL THERAPY | Facility: CLINIC | Age: 72
End: 2022-01-01

## 2022-01-01 ENCOUNTER — HOSPITAL ENCOUNTER (OUTPATIENT)
Dept: RADIATION ONCOLOGY | Facility: HOSPITAL | Age: 72
Setting detail: RADIATION/ONCOLOGY SERIES
Discharge: HOME | End: 2022-02-02

## 2022-01-01 ENCOUNTER — HOSPITAL ENCOUNTER (OUTPATIENT)
Dept: RADIATION ONCOLOGY | Facility: HOSPITAL | Age: 72
Setting detail: RADIATION/ONCOLOGY SERIES
Discharge: HOME | End: 2022-02-21

## 2022-01-01 ENCOUNTER — HOSPITAL ENCOUNTER (OUTPATIENT)
Dept: RADIATION ONCOLOGY | Facility: HOSPITAL | Age: 72
Setting detail: RADIATION/ONCOLOGY SERIES
Discharge: HOME | End: 2022-03-15

## 2022-01-01 ENCOUNTER — HOSPITAL ENCOUNTER (OUTPATIENT)
Dept: RADIATION ONCOLOGY | Facility: HOSPITAL | Age: 72
Setting detail: RADIATION/ONCOLOGY SERIES
Discharge: HOME | End: 2022-03-01

## 2022-01-01 ENCOUNTER — HOSPITAL ENCOUNTER (OUTPATIENT)
Dept: RADIATION ONCOLOGY | Facility: HOSPITAL | Age: 72
Setting detail: RADIATION/ONCOLOGY SERIES
Discharge: HOME | End: 2022-03-14

## 2022-01-01 ENCOUNTER — CONSULT (OUTPATIENT)
Dept: RADIATION ONCOLOGY | Facility: HOSPITAL | Age: 72
End: 2022-01-01

## 2022-01-01 ENCOUNTER — APPOINTMENT (OUTPATIENT)
Dept: CT IMAGING | Facility: HOSPITAL | Age: 72
End: 2022-01-01

## 2022-01-01 ENCOUNTER — HOSPITAL ENCOUNTER (OUTPATIENT)
Dept: RADIATION ONCOLOGY | Facility: HOSPITAL | Age: 72
Setting detail: RADIATION/ONCOLOGY SERIES
Discharge: HOME | End: 2022-02-14

## 2022-01-01 ENCOUNTER — OFFICE VISIT (OUTPATIENT)
Dept: SURGERY | Facility: CLINIC | Age: 72
End: 2022-01-01

## 2022-01-01 ENCOUNTER — TELEPHONE (OUTPATIENT)
Dept: ONCOLOGY | Facility: CLINIC | Age: 72
End: 2022-01-01

## 2022-01-01 ENCOUNTER — HOSPITAL ENCOUNTER (OUTPATIENT)
Dept: CT IMAGING | Facility: HOSPITAL | Age: 72
Discharge: HOME | End: 2022-01-12

## 2022-01-01 ENCOUNTER — HOSPITAL ENCOUNTER (OUTPATIENT)
Dept: RADIATION ONCOLOGY | Facility: HOSPITAL | Age: 72
Setting detail: RADIATION/ONCOLOGY SERIES
Discharge: HOME | End: 2022-02-01

## 2022-01-01 ENCOUNTER — TREATMENT (OUTPATIENT)
Dept: SPEECH THERAPY | Facility: HOSPITAL | Age: 72
End: 2022-01-01

## 2022-01-01 ENCOUNTER — TELEPHONE (OUTPATIENT)
Dept: OTOLARYNGOLOGY | Facility: CLINIC | Age: 72
End: 2022-01-01

## 2022-01-01 ENCOUNTER — ANESTHESIA EVENT (OUTPATIENT)
Dept: PERIOP | Facility: HOSPITAL | Age: 72
End: 2022-01-01

## 2022-01-01 ENCOUNTER — OFFICE VISIT (OUTPATIENT)
Dept: ONCOLOGY | Facility: CLINIC | Age: 72
End: 2022-01-01

## 2022-01-01 ENCOUNTER — HOSPITAL ENCOUNTER (OUTPATIENT)
Dept: RADIATION ONCOLOGY | Facility: HOSPITAL | Age: 72
Setting detail: RADIATION/ONCOLOGY SERIES
Discharge: HOME | End: 2022-03-08

## 2022-01-01 ENCOUNTER — HOSPITAL ENCOUNTER (OUTPATIENT)
Dept: RADIATION ONCOLOGY | Facility: HOSPITAL | Age: 72
Setting detail: RADIATION/ONCOLOGY SERIES
Discharge: HOME | End: 2022-02-11

## 2022-01-01 ENCOUNTER — OFFICE VISIT (OUTPATIENT)
Dept: OTOLARYNGOLOGY | Facility: CLINIC | Age: 72
End: 2022-01-01

## 2022-01-01 ENCOUNTER — HOSPITAL ENCOUNTER (EMERGENCY)
Facility: HOSPITAL | Age: 72
Discharge: HOME | End: 2022-06-21
Attending: STUDENT IN AN ORGANIZED HEALTH CARE EDUCATION/TRAINING PROGRAM | Admitting: STUDENT IN AN ORGANIZED HEALTH CARE EDUCATION/TRAINING PROGRAM

## 2022-01-01 ENCOUNTER — INFUSION (OUTPATIENT)
Dept: ONCOLOGY | Facility: HOSPITAL | Age: 72
End: 2022-01-01

## 2022-01-01 ENCOUNTER — HOSPITAL ENCOUNTER (OUTPATIENT)
Dept: RADIATION ONCOLOGY | Facility: HOSPITAL | Age: 72
Setting detail: RADIATION/ONCOLOGY SERIES
Discharge: HOME | End: 2022-02-07

## 2022-01-01 ENCOUNTER — HOSPITAL ENCOUNTER (OUTPATIENT)
Dept: RADIATION ONCOLOGY | Facility: HOSPITAL | Age: 72
Setting detail: RADIATION/ONCOLOGY SERIES
Discharge: HOME | End: 2022-02-16

## 2022-01-01 ENCOUNTER — APPOINTMENT (OUTPATIENT)
Dept: ONCOLOGY | Facility: HOSPITAL | Age: 72
End: 2022-01-01

## 2022-01-01 ENCOUNTER — HOSPITAL ENCOUNTER (OUTPATIENT)
Dept: CT IMAGING | Facility: HOSPITAL | Age: 72
Discharge: HOME | End: 2022-07-29

## 2022-01-01 ENCOUNTER — APPOINTMENT (OUTPATIENT)
Dept: GENERAL RADIOLOGY | Facility: HOSPITAL | Age: 72
End: 2022-01-01

## 2022-01-01 ENCOUNTER — HOSPITAL ENCOUNTER (OUTPATIENT)
Dept: RADIATION ONCOLOGY | Facility: HOSPITAL | Age: 72
Setting detail: RADIATION/ONCOLOGY SERIES
Discharge: HOME | End: 2022-02-10

## 2022-01-01 ENCOUNTER — OFFICE VISIT (OUTPATIENT)
Dept: RADIATION ONCOLOGY | Facility: HOSPITAL | Age: 72
End: 2022-01-01

## 2022-01-01 ENCOUNTER — APPOINTMENT (OUTPATIENT)
Dept: SPEECH THERAPY | Facility: HOSPITAL | Age: 72
End: 2022-01-01

## 2022-01-01 ENCOUNTER — HOSPITAL ENCOUNTER (OUTPATIENT)
Dept: RADIATION ONCOLOGY | Facility: HOSPITAL | Age: 72
Setting detail: RADIATION/ONCOLOGY SERIES
Discharge: HOME | End: 2022-03-18

## 2022-01-01 ENCOUNTER — CONSULT (OUTPATIENT)
Dept: ONCOLOGY | Facility: CLINIC | Age: 72
End: 2022-01-01

## 2022-01-01 ENCOUNTER — HOSPITAL ENCOUNTER (OUTPATIENT)
Dept: RADIATION ONCOLOGY | Facility: HOSPITAL | Age: 72
Setting detail: RADIATION/ONCOLOGY SERIES
Discharge: HOME | End: 2022-02-28

## 2022-01-01 ENCOUNTER — HOSPITAL ENCOUNTER (OUTPATIENT)
Dept: RADIATION ONCOLOGY | Facility: HOSPITAL | Age: 72
Setting detail: RADIATION/ONCOLOGY SERIES
Discharge: HOME | End: 2022-01-25

## 2022-01-01 ENCOUNTER — PRE-ADMISSION TESTING (OUTPATIENT)
Dept: PREADMISSION TESTING | Facility: HOSPITAL | Age: 72
End: 2022-01-01

## 2022-01-01 ENCOUNTER — NUTRITION (OUTPATIENT)
Dept: SPEECH THERAPY | Facility: HOSPITAL | Age: 72
End: 2022-01-01

## 2022-01-01 ENCOUNTER — HOSPITAL ENCOUNTER (OUTPATIENT)
Dept: RADIATION ONCOLOGY | Facility: HOSPITAL | Age: 72
Setting detail: RADIATION/ONCOLOGY SERIES
Discharge: HOME | End: 2022-02-18

## 2022-01-01 ENCOUNTER — APPOINTMENT (OUTPATIENT)
Dept: LAB | Facility: HOSPITAL | Age: 72
End: 2022-01-01

## 2022-01-01 ENCOUNTER — HOSPITAL ENCOUNTER (OUTPATIENT)
Dept: RADIATION ONCOLOGY | Facility: HOSPITAL | Age: 72
Setting detail: RADIATION/ONCOLOGY SERIES
Discharge: HOME | End: 2022-03-17

## 2022-01-01 ENCOUNTER — HOSPITAL ENCOUNTER (OUTPATIENT)
Dept: CT IMAGING | Facility: HOSPITAL | Age: 72
Discharge: HOME | End: 2022-12-21
Admitting: OTOLARYNGOLOGY

## 2022-01-01 ENCOUNTER — HOSPITAL ENCOUNTER (OUTPATIENT)
Dept: RADIATION ONCOLOGY | Facility: HOSPITAL | Age: 72
Setting detail: RADIATION/ONCOLOGY SERIES
Discharge: HOME | End: 2022-02-22

## 2022-01-01 ENCOUNTER — HOSPITAL ENCOUNTER (OUTPATIENT)
Dept: RADIATION ONCOLOGY | Facility: HOSPITAL | Age: 72
Setting detail: RADIATION/ONCOLOGY SERIES
Discharge: HOME | End: 2022-01-31

## 2022-01-01 ENCOUNTER — HOSPITAL ENCOUNTER (OUTPATIENT)
Dept: RADIATION ONCOLOGY | Facility: HOSPITAL | Age: 72
Setting detail: RADIATION/ONCOLOGY SERIES
Discharge: HOME | End: 2022-03-07

## 2022-01-01 ENCOUNTER — APPOINTMENT (OUTPATIENT)
Dept: RADIATION ONCOLOGY | Facility: HOSPITAL | Age: 72
End: 2022-01-01

## 2022-01-01 ENCOUNTER — HOSPITAL ENCOUNTER (OUTPATIENT)
Dept: RADIATION ONCOLOGY | Facility: HOSPITAL | Age: 72
Setting detail: RADIATION/ONCOLOGY SERIES
Discharge: HOME | End: 2022-03-02

## 2022-01-01 ENCOUNTER — TELEPHONE (OUTPATIENT)
Dept: RADIATION ONCOLOGY | Facility: HOSPITAL | Age: 72
End: 2022-01-01

## 2022-01-01 ENCOUNTER — HOSPITAL ENCOUNTER (OUTPATIENT)
Facility: HOSPITAL | Age: 72
Discharge: HOME | End: 2022-08-13
Attending: STUDENT IN AN ORGANIZED HEALTH CARE EDUCATION/TRAINING PROGRAM | Admitting: STUDENT IN AN ORGANIZED HEALTH CARE EDUCATION/TRAINING PROGRAM

## 2022-01-01 ENCOUNTER — DOCUMENTATION (OUTPATIENT)
Dept: RADIATION ONCOLOGY | Facility: HOSPITAL | Age: 72
End: 2022-01-01

## 2022-01-01 ENCOUNTER — HOSPITAL ENCOUNTER (OUTPATIENT)
Dept: RADIATION ONCOLOGY | Facility: HOSPITAL | Age: 72
Setting detail: RADIATION/ONCOLOGY SERIES
Discharge: HOME | End: 2022-01-28

## 2022-01-01 ENCOUNTER — HOSPITAL ENCOUNTER (OUTPATIENT)
Dept: RADIATION ONCOLOGY | Facility: HOSPITAL | Age: 72
Setting detail: RADIATION/ONCOLOGY SERIES
Discharge: HOME | End: 2022-02-08

## 2022-01-01 ENCOUNTER — HOSPITAL ENCOUNTER (OUTPATIENT)
Dept: RADIATION ONCOLOGY | Facility: HOSPITAL | Age: 72
Setting detail: RADIATION/ONCOLOGY SERIES
Discharge: HOME | End: 2022-01-27

## 2022-01-01 ENCOUNTER — HOSPITAL ENCOUNTER (OUTPATIENT)
Dept: RADIATION ONCOLOGY | Facility: HOSPITAL | Age: 72
Setting detail: RADIATION/ONCOLOGY SERIES
Discharge: HOME | End: 2022-03-21

## 2022-01-01 ENCOUNTER — HOSPITAL ENCOUNTER (OUTPATIENT)
Dept: RADIATION ONCOLOGY | Facility: HOSPITAL | Age: 72
Setting detail: RADIATION/ONCOLOGY SERIES
Discharge: HOME | End: 2022-03-22

## 2022-01-01 ENCOUNTER — HOSPITAL ENCOUNTER (OUTPATIENT)
Dept: RADIATION ONCOLOGY | Facility: HOSPITAL | Age: 72
Setting detail: RADIATION/ONCOLOGY SERIES
Discharge: HOME | End: 2022-02-25

## 2022-01-01 ENCOUNTER — HOSPITAL ENCOUNTER (OUTPATIENT)
Dept: RADIATION ONCOLOGY | Facility: HOSPITAL | Age: 72
Setting detail: RADIATION/ONCOLOGY SERIES
Discharge: HOME | End: 2022-01-20

## 2022-01-01 ENCOUNTER — HOSPITAL ENCOUNTER (OUTPATIENT)
Dept: RADIATION ONCOLOGY | Facility: HOSPITAL | Age: 72
Setting detail: RADIATION/ONCOLOGY SERIES
Discharge: HOME | End: 2022-02-23

## 2022-01-01 ENCOUNTER — HOSPITAL ENCOUNTER (OUTPATIENT)
Dept: RADIATION ONCOLOGY | Facility: HOSPITAL | Age: 72
Setting detail: RADIATION/ONCOLOGY SERIES
Discharge: HOME | End: 2022-03-04

## 2022-01-01 ENCOUNTER — ANESTHESIA (OUTPATIENT)
Dept: PERIOP | Facility: HOSPITAL | Age: 72
End: 2022-01-01

## 2022-01-01 ENCOUNTER — PATIENT ROUNDING (BHMG ONLY) (OUTPATIENT)
Dept: ONCOLOGY | Facility: CLINIC | Age: 72
End: 2022-01-01

## 2022-01-01 ENCOUNTER — HOSPITAL ENCOUNTER (OUTPATIENT)
Dept: RADIATION ONCOLOGY | Facility: HOSPITAL | Age: 72
Setting detail: RADIATION/ONCOLOGY SERIES
Discharge: HOME | End: 2022-02-17

## 2022-01-01 ENCOUNTER — OFFICE VISIT (OUTPATIENT)
Dept: PHYSICAL THERAPY | Facility: CLINIC | Age: 72
End: 2022-01-01

## 2022-01-01 ENCOUNTER — HOSPITAL ENCOUNTER (OUTPATIENT)
Dept: RADIATION ONCOLOGY | Facility: HOSPITAL | Age: 72
Setting detail: RADIATION/ONCOLOGY SERIES
Discharge: HOME | End: 2022-03-03

## 2022-01-01 ENCOUNTER — HOSPITAL ENCOUNTER (OUTPATIENT)
Dept: RADIATION ONCOLOGY | Facility: HOSPITAL | Age: 72
Setting detail: RADIATION/ONCOLOGY SERIES
Discharge: HOME | End: 2022-02-09

## 2022-01-01 VITALS
HEART RATE: 61 BPM | SYSTOLIC BLOOD PRESSURE: 108 MMHG | OXYGEN SATURATION: 100 % | WEIGHT: 147 LBS | TEMPERATURE: 97 F | RESPIRATION RATE: 20 BRPM | HEIGHT: 70 IN | DIASTOLIC BLOOD PRESSURE: 50 MMHG | BODY MASS INDEX: 21.05 KG/M2

## 2022-01-01 VITALS
DIASTOLIC BLOOD PRESSURE: 78 MMHG | HEIGHT: 71 IN | HEART RATE: 84 BPM | RESPIRATION RATE: 18 BRPM | OXYGEN SATURATION: 92 % | WEIGHT: 130 LBS | SYSTOLIC BLOOD PRESSURE: 126 MMHG | BODY MASS INDEX: 18.2 KG/M2

## 2022-01-01 VITALS
OXYGEN SATURATION: 94 % | HEIGHT: 68 IN | WEIGHT: 160 LBS | SYSTOLIC BLOOD PRESSURE: 137 MMHG | BODY MASS INDEX: 24.25 KG/M2 | DIASTOLIC BLOOD PRESSURE: 77 MMHG | HEART RATE: 88 BPM

## 2022-01-01 VITALS
RESPIRATION RATE: 16 BRPM | BODY MASS INDEX: 23.04 KG/M2 | SYSTOLIC BLOOD PRESSURE: 105 MMHG | HEIGHT: 68 IN | WEIGHT: 152 LBS | DIASTOLIC BLOOD PRESSURE: 58 MMHG | TEMPERATURE: 97.7 F | HEART RATE: 79 BPM | OXYGEN SATURATION: 98 %

## 2022-01-01 VITALS — WEIGHT: 130 LBS | BODY MASS INDEX: 18.2 KG/M2 | HEIGHT: 71 IN

## 2022-01-01 VITALS
WEIGHT: 128.2 LBS | TEMPERATURE: 98.8 F | DIASTOLIC BLOOD PRESSURE: 55 MMHG | HEART RATE: 70 BPM | SYSTOLIC BLOOD PRESSURE: 98 MMHG | RESPIRATION RATE: 18 BRPM | OXYGEN SATURATION: 96 % | HEIGHT: 67 IN | BODY MASS INDEX: 20.12 KG/M2

## 2022-01-01 VITALS
OXYGEN SATURATION: 92 % | HEIGHT: 68 IN | BODY MASS INDEX: 19.48 KG/M2 | DIASTOLIC BLOOD PRESSURE: 59 MMHG | SYSTOLIC BLOOD PRESSURE: 112 MMHG | WEIGHT: 128.53 LBS | HEART RATE: 86 BPM | RESPIRATION RATE: 18 BRPM

## 2022-01-01 VITALS
WEIGHT: 127 LBS | HEART RATE: 98 BPM | HEIGHT: 67 IN | BODY MASS INDEX: 19.93 KG/M2 | OXYGEN SATURATION: 93 % | SYSTOLIC BLOOD PRESSURE: 116 MMHG | DIASTOLIC BLOOD PRESSURE: 62 MMHG

## 2022-01-01 VITALS
DIASTOLIC BLOOD PRESSURE: 68 MMHG | HEIGHT: 71 IN | TEMPERATURE: 97.8 F | RESPIRATION RATE: 18 BRPM | BODY MASS INDEX: 20.58 KG/M2 | WEIGHT: 147 LBS | HEART RATE: 76 BPM | SYSTOLIC BLOOD PRESSURE: 122 MMHG | OXYGEN SATURATION: 96 %

## 2022-01-01 VITALS
BODY MASS INDEX: 23.49 KG/M2 | WEIGHT: 155 LBS | RESPIRATION RATE: 18 BRPM | OXYGEN SATURATION: 98 % | TEMPERATURE: 97.8 F | DIASTOLIC BLOOD PRESSURE: 62 MMHG | HEART RATE: 75 BPM | SYSTOLIC BLOOD PRESSURE: 115 MMHG | HEIGHT: 68 IN

## 2022-01-01 VITALS
HEART RATE: 77 BPM | DIASTOLIC BLOOD PRESSURE: 60 MMHG | RESPIRATION RATE: 18 BRPM | BODY MASS INDEX: 21.55 KG/M2 | OXYGEN SATURATION: 95 % | SYSTOLIC BLOOD PRESSURE: 132 MMHG | TEMPERATURE: 98.4 F | HEIGHT: 70 IN | WEIGHT: 150.5 LBS

## 2022-01-01 VITALS
DIASTOLIC BLOOD PRESSURE: 64 MMHG | RESPIRATION RATE: 18 BRPM | SYSTOLIC BLOOD PRESSURE: 116 MMHG | HEIGHT: 68 IN | WEIGHT: 149 LBS | OXYGEN SATURATION: 98 % | TEMPERATURE: 98.2 F | BODY MASS INDEX: 22.58 KG/M2 | HEART RATE: 84 BPM

## 2022-01-01 VITALS
OXYGEN SATURATION: 93 % | BODY MASS INDEX: 20.09 KG/M2 | HEIGHT: 67 IN | SYSTOLIC BLOOD PRESSURE: 118 MMHG | WEIGHT: 128 LBS | DIASTOLIC BLOOD PRESSURE: 59 MMHG | HEART RATE: 74 BPM

## 2022-01-01 VITALS
TEMPERATURE: 97.8 F | WEIGHT: 130.2 LBS | BODY MASS INDEX: 18.23 KG/M2 | RESPIRATION RATE: 18 BRPM | SYSTOLIC BLOOD PRESSURE: 110 MMHG | DIASTOLIC BLOOD PRESSURE: 60 MMHG | HEART RATE: 80 BPM | HEIGHT: 71 IN | OXYGEN SATURATION: 92 %

## 2022-01-01 VITALS
HEART RATE: 84 BPM | OXYGEN SATURATION: 97 % | HEIGHT: 68 IN | BODY MASS INDEX: 22.51 KG/M2 | SYSTOLIC BLOOD PRESSURE: 118 MMHG | DIASTOLIC BLOOD PRESSURE: 62 MMHG | TEMPERATURE: 98.4 F | RESPIRATION RATE: 18 BRPM | WEIGHT: 148.5 LBS

## 2022-01-01 VITALS
DIASTOLIC BLOOD PRESSURE: 47 MMHG | TEMPERATURE: 97.4 F | HEIGHT: 70 IN | RESPIRATION RATE: 18 BRPM | SYSTOLIC BLOOD PRESSURE: 102 MMHG | OXYGEN SATURATION: 99 % | HEART RATE: 65 BPM | BODY MASS INDEX: 21.33 KG/M2 | WEIGHT: 149 LBS

## 2022-01-01 VITALS
HEART RATE: 80 BPM | BODY MASS INDEX: 20.09 KG/M2 | TEMPERATURE: 100.4 F | DIASTOLIC BLOOD PRESSURE: 67 MMHG | HEIGHT: 67 IN | WEIGHT: 128 LBS | SYSTOLIC BLOOD PRESSURE: 112 MMHG

## 2022-01-01 VITALS
OXYGEN SATURATION: 91 % | DIASTOLIC BLOOD PRESSURE: 88 MMHG | HEART RATE: 89 BPM | WEIGHT: 127.6 LBS | BODY MASS INDEX: 20.03 KG/M2 | RESPIRATION RATE: 18 BRPM | TEMPERATURE: 97.6 F | SYSTOLIC BLOOD PRESSURE: 104 MMHG | HEIGHT: 67 IN

## 2022-01-01 VITALS
HEIGHT: 70 IN | DIASTOLIC BLOOD PRESSURE: 54 MMHG | SYSTOLIC BLOOD PRESSURE: 108 MMHG | BODY MASS INDEX: 19.04 KG/M2 | OXYGEN SATURATION: 95 % | WEIGHT: 133 LBS | HEART RATE: 77 BPM

## 2022-01-01 VITALS
TEMPERATURE: 96.2 F | HEIGHT: 68 IN | RESPIRATION RATE: 16 BRPM | DIASTOLIC BLOOD PRESSURE: 63 MMHG | OXYGEN SATURATION: 100 % | SYSTOLIC BLOOD PRESSURE: 104 MMHG | WEIGHT: 155.8 LBS | BODY MASS INDEX: 23.61 KG/M2 | HEART RATE: 69 BPM

## 2022-01-01 VITALS
HEIGHT: 71 IN | WEIGHT: 132 LBS | BODY MASS INDEX: 18.48 KG/M2 | DIASTOLIC BLOOD PRESSURE: 81 MMHG | SYSTOLIC BLOOD PRESSURE: 152 MMHG | TEMPERATURE: 98.4 F | HEART RATE: 95 BPM

## 2022-01-01 VITALS
TEMPERATURE: 97.7 F | HEIGHT: 68 IN | HEART RATE: 55 BPM | WEIGHT: 132.5 LBS | OXYGEN SATURATION: 94 % | DIASTOLIC BLOOD PRESSURE: 55 MMHG | RESPIRATION RATE: 16 BRPM | BODY MASS INDEX: 20.08 KG/M2 | SYSTOLIC BLOOD PRESSURE: 102 MMHG

## 2022-01-01 VITALS
WEIGHT: 130 LBS | SYSTOLIC BLOOD PRESSURE: 125 MMHG | TEMPERATURE: 98.9 F | HEIGHT: 71 IN | DIASTOLIC BLOOD PRESSURE: 75 MMHG | HEART RATE: 86 BPM | BODY MASS INDEX: 18.2 KG/M2

## 2022-01-01 VITALS
BODY MASS INDEX: 20.4 KG/M2 | WEIGHT: 130 LBS | HEART RATE: 87 BPM | DIASTOLIC BLOOD PRESSURE: 62 MMHG | HEIGHT: 67 IN | SYSTOLIC BLOOD PRESSURE: 106 MMHG | TEMPERATURE: 98.4 F

## 2022-01-01 VITALS
TEMPERATURE: 97.8 F | OXYGEN SATURATION: 98 % | WEIGHT: 147 LBS | SYSTOLIC BLOOD PRESSURE: 120 MMHG | HEIGHT: 71 IN | HEART RATE: 75 BPM | DIASTOLIC BLOOD PRESSURE: 66 MMHG | RESPIRATION RATE: 18 BRPM | BODY MASS INDEX: 20.58 KG/M2

## 2022-01-01 VITALS
DIASTOLIC BLOOD PRESSURE: 60 MMHG | WEIGHT: 122 LBS | BODY MASS INDEX: 17.08 KG/M2 | OXYGEN SATURATION: 97 % | HEART RATE: 77 BPM | HEIGHT: 71 IN | TEMPERATURE: 98 F | SYSTOLIC BLOOD PRESSURE: 107 MMHG | RESPIRATION RATE: 18 BRPM

## 2022-01-01 VITALS
BODY MASS INDEX: 23.67 KG/M2 | OXYGEN SATURATION: 96 % | SYSTOLIC BLOOD PRESSURE: 122 MMHG | RESPIRATION RATE: 18 BRPM | TEMPERATURE: 98.3 F | DIASTOLIC BLOOD PRESSURE: 74 MMHG | HEIGHT: 69 IN | HEART RATE: 82 BPM | WEIGHT: 159.8 LBS

## 2022-01-01 VITALS — HEIGHT: 68 IN | BODY MASS INDEX: 23.69 KG/M2

## 2022-01-01 DIAGNOSIS — I89.0 LYMPHEDEMA DUE TO RADIATION: ICD-10-CM

## 2022-01-01 DIAGNOSIS — R63.4 ABNORMAL WEIGHT LOSS: ICD-10-CM

## 2022-01-01 DIAGNOSIS — C32.1 SQUAMOUS CELL CANCER OF EPIGLOTTIS: Primary | ICD-10-CM

## 2022-01-01 DIAGNOSIS — R13.10 DYSPHAGIA, UNSPECIFIED TYPE: Primary | ICD-10-CM

## 2022-01-01 DIAGNOSIS — I89.0 LYMPHEDEMA DUE TO RADIATION: Primary | ICD-10-CM

## 2022-01-01 DIAGNOSIS — C32.1 SQUAMOUS CELL CANCER OF EPIGLOTTIS: ICD-10-CM

## 2022-01-01 DIAGNOSIS — Z92.3 HISTORY OF RADIATION THERAPY: ICD-10-CM

## 2022-01-01 DIAGNOSIS — E44.0 MODERATE PROTEIN-CALORIE MALNUTRITION: ICD-10-CM

## 2022-01-01 DIAGNOSIS — R13.10 PAINFUL SWALLOWING: ICD-10-CM

## 2022-01-01 DIAGNOSIS — R13.12 OROPHARYNGEAL DYSPHAGIA: Primary | ICD-10-CM

## 2022-01-01 DIAGNOSIS — Q38.8 VELOPHARYNGEAL INSUFFICIENCY (VPI), CONGENITAL: ICD-10-CM

## 2022-01-01 DIAGNOSIS — J34.2 ACQUIRED DEVIATED NASAL SEPTUM: ICD-10-CM

## 2022-01-01 DIAGNOSIS — C79.89 METASTATIC SQUAMOUS CELL CARCINOMA TO HEAD AND NECK: ICD-10-CM

## 2022-01-01 DIAGNOSIS — R13.12 OROPHARYNGEAL DYSPHAGIA: ICD-10-CM

## 2022-01-01 DIAGNOSIS — D50.8 IRON DEFICIENCY ANEMIA SECONDARY TO INADEQUATE DIETARY IRON INTAKE: ICD-10-CM

## 2022-01-01 DIAGNOSIS — D64.81 ANEMIA DUE TO ANTINEOPLASTIC CHEMOTHERAPY: ICD-10-CM

## 2022-01-01 DIAGNOSIS — T45.1X5A ANEMIA DUE TO ANTINEOPLASTIC CHEMOTHERAPY: ICD-10-CM

## 2022-01-01 DIAGNOSIS — T17.908D ASPIRATION INTO AIRWAY, SUBSEQUENT ENCOUNTER: ICD-10-CM

## 2022-01-01 DIAGNOSIS — Z87.891 FORMER SMOKER: ICD-10-CM

## 2022-01-01 DIAGNOSIS — E43 SEVERE PROTEIN-CALORIE MALNUTRITION: ICD-10-CM

## 2022-01-01 DIAGNOSIS — D50.9 IRON DEFICIENCY ANEMIA, UNSPECIFIED IRON DEFICIENCY ANEMIA TYPE: Primary | ICD-10-CM

## 2022-01-01 DIAGNOSIS — G89.3 CANCER RELATED PAIN: ICD-10-CM

## 2022-01-01 DIAGNOSIS — G89.29 CHRONIC NECK PAIN: Primary | ICD-10-CM

## 2022-01-01 DIAGNOSIS — M54.2 CHRONIC NECK PAIN: Primary | ICD-10-CM

## 2022-01-01 DIAGNOSIS — Z01.818 PRE-OP TESTING: ICD-10-CM

## 2022-01-01 DIAGNOSIS — R64 CACHEXIA: ICD-10-CM

## 2022-01-01 DIAGNOSIS — J34.2 ACQUIRED DEVIATED NASAL SEPTUM: Primary | ICD-10-CM

## 2022-01-01 DIAGNOSIS — Z01.818 PRE-OP TESTING: Primary | ICD-10-CM

## 2022-01-01 LAB
ALBUMIN SERPL-MCNC: 3.1 G/DL (ref 3.5–5.2)
ALBUMIN SERPL-MCNC: 3.1 G/DL (ref 3.5–5.2)
ALBUMIN SERPL-MCNC: 3.4 G/DL (ref 3.5–5.2)
ALBUMIN SERPL-MCNC: 3.7 G/DL (ref 3.5–5.2)
ALBUMIN SERPL-MCNC: 3.8 G/DL (ref 3.5–5.2)
ALBUMIN SERPL-MCNC: 3.9 G/DL (ref 3.5–5.2)
ALBUMIN SERPL-MCNC: 4 G/DL (ref 3.5–5.2)
ALBUMIN SERPL-MCNC: 4.2 G/DL (ref 3.5–5.2)
ALBUMIN/GLOB SERPL: 0.8 G/DL
ALBUMIN/GLOB SERPL: 0.9 G/DL
ALBUMIN/GLOB SERPL: 0.9 G/DL
ALBUMIN/GLOB SERPL: 1 G/DL
ALBUMIN/GLOB SERPL: 1 G/DL
ALBUMIN/GLOB SERPL: 1.2 G/DL
ALBUMIN/GLOB SERPL: 1.3 G/DL
ALBUMIN/GLOB SERPL: 1.4 G/DL
ALBUMIN/GLOB SERPL: 1.5 G/DL
ALP SERPL-CCNC: 103 U/L (ref 39–117)
ALP SERPL-CCNC: 119 U/L (ref 39–117)
ALP SERPL-CCNC: 60 U/L (ref 39–117)
ALP SERPL-CCNC: 64 U/L (ref 39–117)
ALP SERPL-CCNC: 68 U/L (ref 39–117)
ALP SERPL-CCNC: 70 U/L (ref 39–117)
ALP SERPL-CCNC: 73 U/L (ref 39–117)
ALP SERPL-CCNC: 74 U/L (ref 39–117)
ALP SERPL-CCNC: 78 U/L (ref 39–117)
ALP SERPL-CCNC: 81 U/L (ref 39–117)
ALP SERPL-CCNC: 82 U/L (ref 39–117)
ALP SERPL-CCNC: 84 U/L (ref 39–117)
ALP SERPL-CCNC: 91 U/L (ref 39–117)
ALP SERPL-CCNC: 92 U/L (ref 39–117)
ALP SERPL-CCNC: 92 U/L (ref 39–117)
ALT SERPL W P-5'-P-CCNC: 10 U/L (ref 1–41)
ALT SERPL W P-5'-P-CCNC: 10 U/L (ref 1–41)
ALT SERPL W P-5'-P-CCNC: 11 U/L (ref 1–41)
ALT SERPL W P-5'-P-CCNC: 11 U/L (ref 1–41)
ALT SERPL W P-5'-P-CCNC: 14 U/L (ref 1–41)
ALT SERPL W P-5'-P-CCNC: 15 U/L (ref 1–41)
ALT SERPL W P-5'-P-CCNC: 17 U/L (ref 1–41)
ALT SERPL W P-5'-P-CCNC: 17 U/L (ref 1–41)
ALT SERPL W P-5'-P-CCNC: 18 U/L (ref 1–41)
ALT SERPL W P-5'-P-CCNC: 31 U/L (ref 1–41)
ALT SERPL W P-5'-P-CCNC: 7 U/L (ref 1–41)
ALT SERPL W P-5'-P-CCNC: 7 U/L (ref 1–41)
ALT SERPL W P-5'-P-CCNC: 8 U/L (ref 1–41)
ALT SERPL W P-5'-P-CCNC: 8 U/L (ref 1–41)
ALT SERPL W P-5'-P-CCNC: 9 U/L (ref 1–41)
ANION GAP SERPL CALCULATED.3IONS-SCNC: 5 MMOL/L (ref 5–15)
ANION GAP SERPL CALCULATED.3IONS-SCNC: 5 MMOL/L (ref 5–15)
ANION GAP SERPL CALCULATED.3IONS-SCNC: 6 MMOL/L (ref 5–15)
ANION GAP SERPL CALCULATED.3IONS-SCNC: 7 MMOL/L (ref 5–15)
ANION GAP SERPL CALCULATED.3IONS-SCNC: 8 MMOL/L (ref 5–15)
ANION GAP SERPL CALCULATED.3IONS-SCNC: 9 MMOL/L (ref 5–15)
AST SERPL-CCNC: 12 U/L (ref 1–40)
AST SERPL-CCNC: 12 U/L (ref 1–40)
AST SERPL-CCNC: 13 U/L (ref 1–40)
AST SERPL-CCNC: 13 U/L (ref 1–40)
AST SERPL-CCNC: 15 U/L (ref 1–40)
AST SERPL-CCNC: 17 U/L (ref 1–40)
AST SERPL-CCNC: 18 U/L (ref 1–40)
AST SERPL-CCNC: 20 U/L (ref 1–40)
AST SERPL-CCNC: 22 U/L (ref 1–40)
AST SERPL-CCNC: 23 U/L (ref 1–40)
AST SERPL-CCNC: 57 U/L (ref 1–40)
BASOPHILS # BLD AUTO: 0 10*3/MM3 (ref 0–0.2)
BASOPHILS # BLD AUTO: 0.01 10*3/MM3 (ref 0–0.2)
BASOPHILS # BLD AUTO: 0.02 10*3/MM3 (ref 0–0.2)
BASOPHILS # BLD AUTO: 0.03 10*3/MM3 (ref 0–0.2)
BASOPHILS # BLD AUTO: 0.04 10*3/MM3 (ref 0–0.2)
BASOPHILS NFR BLD AUTO: 0 % (ref 0–1.5)
BASOPHILS NFR BLD AUTO: 0.2 % (ref 0–1.5)
BASOPHILS NFR BLD AUTO: 0.3 % (ref 0–1.5)
BASOPHILS NFR BLD AUTO: 0.4 % (ref 0–1.5)
BASOPHILS NFR BLD AUTO: 0.5 % (ref 0–1.5)
BASOPHILS NFR BLD AUTO: 0.5 % (ref 0–1.5)
BASOPHILS NFR BLD AUTO: 0.7 % (ref 0–1.5)
BASOPHILS NFR BLD AUTO: 0.7 % (ref 0–1.5)
BILIRUB SERPL-MCNC: 0.3 MG/DL (ref 0–1.2)
BILIRUB SERPL-MCNC: 0.4 MG/DL (ref 0–1.2)
BILIRUB SERPL-MCNC: 0.5 MG/DL (ref 0–1.2)
BILIRUB SERPL-MCNC: 0.6 MG/DL (ref 0–1.2)
BILIRUB SERPL-MCNC: 0.7 MG/DL (ref 0–1.2)
BILIRUB SERPL-MCNC: 0.9 MG/DL (ref 0–1.2)
BILIRUB SERPL-MCNC: 1.2 MG/DL (ref 0–1.2)
BUN SERPL-MCNC: 10 MG/DL (ref 8–23)
BUN SERPL-MCNC: 11 MG/DL (ref 8–23)
BUN SERPL-MCNC: 12 MG/DL (ref 8–23)
BUN SERPL-MCNC: 13 MG/DL (ref 8–23)
BUN SERPL-MCNC: 14 MG/DL (ref 8–23)
BUN SERPL-MCNC: 17 MG/DL (ref 8–23)
BUN SERPL-MCNC: 20 MG/DL (ref 8–23)
BUN SERPL-MCNC: 21 MG/DL (ref 8–23)
BUN SERPL-MCNC: 23 MG/DL (ref 8–23)
BUN SERPL-MCNC: 26 MG/DL (ref 8–23)
BUN SERPL-MCNC: 6 MG/DL (ref 8–23)
BUN SERPL-MCNC: 8 MG/DL (ref 8–23)
BUN SERPL-MCNC: 9 MG/DL (ref 8–23)
BUN/CREAT SERPL: 11.9 (ref 7–25)
BUN/CREAT SERPL: 14.3 (ref 7–25)
BUN/CREAT SERPL: 14.7 (ref 7–25)
BUN/CREAT SERPL: 15.9 (ref 7–25)
BUN/CREAT SERPL: 16.9 (ref 7–25)
BUN/CREAT SERPL: 17.1 (ref 7–25)
BUN/CREAT SERPL: 18.3 (ref 7–25)
BUN/CREAT SERPL: 19.4 (ref 7–25)
BUN/CREAT SERPL: 21.9 (ref 7–25)
BUN/CREAT SERPL: 27.9 (ref 7–25)
BUN/CREAT SERPL: 28.4 (ref 7–25)
BUN/CREAT SERPL: 32.8 (ref 7–25)
BUN/CREAT SERPL: 32.9 (ref 7–25)
BUN/CREAT SERPL: 40 (ref 7–25)
BUN/CREAT SERPL: 8.5 (ref 7–25)
CALCIUM SPEC-SCNC: 8.7 MG/DL (ref 8.6–10.5)
CALCIUM SPEC-SCNC: 8.7 MG/DL (ref 8.6–10.5)
CALCIUM SPEC-SCNC: 8.8 MG/DL (ref 8.6–10.5)
CALCIUM SPEC-SCNC: 8.8 MG/DL (ref 8.6–10.5)
CALCIUM SPEC-SCNC: 8.9 MG/DL (ref 8.6–10.5)
CALCIUM SPEC-SCNC: 9 MG/DL (ref 8.6–10.5)
CALCIUM SPEC-SCNC: 9 MG/DL (ref 8.6–10.5)
CALCIUM SPEC-SCNC: 9.3 MG/DL (ref 8.6–10.5)
CALCIUM SPEC-SCNC: 9.3 MG/DL (ref 8.6–10.5)
CALCIUM SPEC-SCNC: 9.4 MG/DL (ref 8.6–10.5)
CALCIUM SPEC-SCNC: 9.5 MG/DL (ref 8.6–10.5)
CALCIUM SPEC-SCNC: 9.6 MG/DL (ref 8.6–10.5)
CALCIUM SPEC-SCNC: 9.7 MG/DL (ref 8.6–10.5)
CHLORIDE SERPL-SCNC: 101 MMOL/L (ref 98–107)
CHLORIDE SERPL-SCNC: 102 MMOL/L (ref 98–107)
CHLORIDE SERPL-SCNC: 103 MMOL/L (ref 98–107)
CHLORIDE SERPL-SCNC: 104 MMOL/L (ref 98–107)
CHLORIDE SERPL-SCNC: 105 MMOL/L (ref 98–107)
CO2 SERPL-SCNC: 28 MMOL/L (ref 22–29)
CO2 SERPL-SCNC: 29 MMOL/L (ref 22–29)
CO2 SERPL-SCNC: 30 MMOL/L (ref 22–29)
CO2 SERPL-SCNC: 31 MMOL/L (ref 22–29)
CO2 SERPL-SCNC: 32 MMOL/L (ref 22–29)
CO2 SERPL-SCNC: 32 MMOL/L (ref 22–29)
CO2 SERPL-SCNC: 33 MMOL/L (ref 22–29)
CO2 SERPL-SCNC: 34 MMOL/L (ref 22–29)
CO2 SERPL-SCNC: 34 MMOL/L (ref 22–29)
CREAT SERPL-MCNC: 0.61 MG/DL (ref 0.76–1.27)
CREAT SERPL-MCNC: 0.61 MG/DL (ref 0.76–1.27)
CREAT SERPL-MCNC: 0.62 MG/DL (ref 0.76–1.27)
CREAT SERPL-MCNC: 0.63 MG/DL (ref 0.76–1.27)
CREAT SERPL-MCNC: 0.64 MG/DL (ref 0.76–1.27)
CREAT SERPL-MCNC: 0.65 MG/DL (ref 0.76–1.27)
CREAT SERPL-MCNC: 0.67 MG/DL (ref 0.76–1.27)
CREAT SERPL-MCNC: 0.68 MG/DL (ref 0.76–1.27)
CREAT SERPL-MCNC: 0.69 MG/DL (ref 0.76–1.27)
CREAT SERPL-MCNC: 0.7 MG/DL (ref 0.76–1.27)
CREAT SERPL-MCNC: 0.71 MG/DL (ref 0.76–1.27)
CREAT SERPL-MCNC: 0.74 MG/DL (ref 0.76–1.27)
DEPRECATED RDW RBC AUTO: 46.9 FL (ref 37–54)
DEPRECATED RDW RBC AUTO: 47.3 FL (ref 37–54)
DEPRECATED RDW RBC AUTO: 49.9 FL (ref 37–54)
DEPRECATED RDW RBC AUTO: 49.9 FL (ref 37–54)
DEPRECATED RDW RBC AUTO: 50.7 FL (ref 37–54)
DEPRECATED RDW RBC AUTO: 51.7 FL (ref 37–54)
DEPRECATED RDW RBC AUTO: 53.2 FL (ref 37–54)
DEPRECATED RDW RBC AUTO: 53.7 FL (ref 37–54)
DEPRECATED RDW RBC AUTO: 54.2 FL (ref 37–54)
DEPRECATED RDW RBC AUTO: 55.3 FL (ref 37–54)
DEPRECATED RDW RBC AUTO: 56.6 FL (ref 37–54)
DEPRECATED RDW RBC AUTO: 61.3 FL (ref 37–54)
DEPRECATED RDW RBC AUTO: 63.9 FL (ref 37–54)
DEPRECATED RDW RBC AUTO: 66.3 FL (ref 37–54)
DEPRECATED RDW RBC AUTO: 68.2 FL (ref 37–54)
DEPRECATED RDW RBC AUTO: 68.6 FL (ref 37–54)
EGFRCR SERPLBLD CKD-EPI 2021: 100.6 ML/MIN/1.73
EGFRCR SERPLBLD CKD-EPI 2021: 101.4 ML/MIN/1.73
EGFRCR SERPLBLD CKD-EPI 2021: 102.3 ML/MIN/1.73
EGFRCR SERPLBLD CKD-EPI 2021: 102.5 ML/MIN/1.73
EGFRCR SERPLBLD CKD-EPI 2021: 103.3 ML/MIN/1.73
EGFRCR SERPLBLD CKD-EPI 2021: 103.3 ML/MIN/1.73
EGFRCR SERPLBLD CKD-EPI 2021: 97.5 ML/MIN/1.73
EGFRCR SERPLBLD CKD-EPI 2021: 99.1 ML/MIN/1.73
EGFRCR SERPLBLD CKD-EPI 2021: 99.3 ML/MIN/1.73
EGFRCR SERPLBLD CKD-EPI 2021: 99.7 ML/MIN/1.73
EGFRCR SERPLBLD CKD-EPI 2021: 99.7 ML/MIN/1.73
EOSINOPHIL # BLD AUTO: 0.06 10*3/MM3 (ref 0–0.4)
EOSINOPHIL # BLD AUTO: 0.07 10*3/MM3 (ref 0–0.4)
EOSINOPHIL # BLD AUTO: 0.08 10*3/MM3 (ref 0–0.4)
EOSINOPHIL # BLD AUTO: 0.09 10*3/MM3 (ref 0–0.4)
EOSINOPHIL # BLD AUTO: 0.11 10*3/MM3 (ref 0–0.4)
EOSINOPHIL # BLD AUTO: 0.11 10*3/MM3 (ref 0–0.4)
EOSINOPHIL # BLD AUTO: 0.14 10*3/MM3 (ref 0–0.4)
EOSINOPHIL # BLD AUTO: 0.15 10*3/MM3 (ref 0–0.4)
EOSINOPHIL # BLD AUTO: 0.17 10*3/MM3 (ref 0–0.4)
EOSINOPHIL # BLD AUTO: 0.18 10*3/MM3 (ref 0–0.4)
EOSINOPHIL # BLD AUTO: 0.19 10*3/MM3 (ref 0–0.4)
EOSINOPHIL NFR BLD AUTO: 1 % (ref 0.3–6.2)
EOSINOPHIL NFR BLD AUTO: 1.4 % (ref 0.3–6.2)
EOSINOPHIL NFR BLD AUTO: 1.5 % (ref 0.3–6.2)
EOSINOPHIL NFR BLD AUTO: 1.5 % (ref 0.3–6.2)
EOSINOPHIL NFR BLD AUTO: 1.7 % (ref 0.3–6.2)
EOSINOPHIL NFR BLD AUTO: 2.4 % (ref 0.3–6.2)
EOSINOPHIL NFR BLD AUTO: 2.4 % (ref 0.3–6.2)
EOSINOPHIL NFR BLD AUTO: 2.7 % (ref 0.3–6.2)
EOSINOPHIL NFR BLD AUTO: 2.8 % (ref 0.3–6.2)
EOSINOPHIL NFR BLD AUTO: 3.8 % (ref 0.3–6.2)
EOSINOPHIL NFR BLD AUTO: 4.6 % (ref 0.3–6.2)
EOSINOPHIL NFR BLD AUTO: 5 % (ref 0.3–6.2)
EOSINOPHIL NFR BLD AUTO: 7.5 % (ref 0.3–6.2)
ERYTHROCYTE [DISTWIDTH] IN BLOOD BY AUTOMATED COUNT: 13.1 % (ref 12.3–15.4)
ERYTHROCYTE [DISTWIDTH] IN BLOOD BY AUTOMATED COUNT: 13.3 % (ref 12.3–15.4)
ERYTHROCYTE [DISTWIDTH] IN BLOOD BY AUTOMATED COUNT: 15 % (ref 12.3–15.4)
ERYTHROCYTE [DISTWIDTH] IN BLOOD BY AUTOMATED COUNT: 15.2 % (ref 12.3–15.4)
ERYTHROCYTE [DISTWIDTH] IN BLOOD BY AUTOMATED COUNT: 15.2 % (ref 12.3–15.4)
ERYTHROCYTE [DISTWIDTH] IN BLOOD BY AUTOMATED COUNT: 15.3 % (ref 12.3–15.4)
ERYTHROCYTE [DISTWIDTH] IN BLOOD BY AUTOMATED COUNT: 15.3 % (ref 12.3–15.4)
ERYTHROCYTE [DISTWIDTH] IN BLOOD BY AUTOMATED COUNT: 15.5 % (ref 12.3–15.4)
ERYTHROCYTE [DISTWIDTH] IN BLOOD BY AUTOMATED COUNT: 16.1 % (ref 12.3–15.4)
ERYTHROCYTE [DISTWIDTH] IN BLOOD BY AUTOMATED COUNT: 16.2 % (ref 12.3–15.4)
ERYTHROCYTE [DISTWIDTH] IN BLOOD BY AUTOMATED COUNT: 16.6 % (ref 12.3–15.4)
ERYTHROCYTE [DISTWIDTH] IN BLOOD BY AUTOMATED COUNT: 17.9 % (ref 12.3–15.4)
ERYTHROCYTE [DISTWIDTH] IN BLOOD BY AUTOMATED COUNT: 18.6 % (ref 12.3–15.4)
ERYTHROCYTE [DISTWIDTH] IN BLOOD BY AUTOMATED COUNT: 19 % (ref 12.3–15.4)
ERYTHROCYTE [DISTWIDTH] IN BLOOD BY AUTOMATED COUNT: 19.2 % (ref 12.3–15.4)
ERYTHROCYTE [DISTWIDTH] IN BLOOD BY AUTOMATED COUNT: 19.9 % (ref 12.3–15.4)
FERRITIN SERPL-MCNC: 454.9 NG/ML (ref 30–400)
FERRITIN SERPL-MCNC: 481 NG/ML (ref 30–400)
FOLATE SERPL-MCNC: 9.19 NG/ML (ref 4.78–24.2)
GFR SERPL CREATININE-BSD FRML MDRD: 110 ML/MIN/1.73
GFR SERPL CREATININE-BSD FRML MDRD: 110 ML/MIN/1.73
GFR SERPL CREATININE-BSD FRML MDRD: 112 ML/MIN/1.73
GFR SERPL CREATININE-BSD FRML MDRD: 114 ML/MIN/1.73
GFR SERPL CREATININE-BSD FRML MDRD: 118 ML/MIN/1.73
GFR SERPL CREATININE-BSD FRML MDRD: 129 ML/MIN/1.73
GLOBULIN UR ELPH-MCNC: 2.7 GM/DL
GLOBULIN UR ELPH-MCNC: 2.7 GM/DL
GLOBULIN UR ELPH-MCNC: 2.8 GM/DL
GLOBULIN UR ELPH-MCNC: 2.9 GM/DL
GLOBULIN UR ELPH-MCNC: 3 GM/DL
GLOBULIN UR ELPH-MCNC: 3 GM/DL
GLOBULIN UR ELPH-MCNC: 3.2 GM/DL
GLOBULIN UR ELPH-MCNC: 3.2 GM/DL
GLOBULIN UR ELPH-MCNC: 3.3 GM/DL
GLOBULIN UR ELPH-MCNC: 3.6 GM/DL
GLOBULIN UR ELPH-MCNC: 3.7 GM/DL
GLOBULIN UR ELPH-MCNC: 3.9 GM/DL
GLOBULIN UR ELPH-MCNC: 4.2 GM/DL
GLUCOSE SERPL-MCNC: 100 MG/DL (ref 65–99)
GLUCOSE SERPL-MCNC: 102 MG/DL (ref 65–99)
GLUCOSE SERPL-MCNC: 106 MG/DL (ref 65–99)
GLUCOSE SERPL-MCNC: 107 MG/DL (ref 65–99)
GLUCOSE SERPL-MCNC: 108 MG/DL (ref 65–99)
GLUCOSE SERPL-MCNC: 112 MG/DL (ref 65–99)
GLUCOSE SERPL-MCNC: 112 MG/DL (ref 65–99)
GLUCOSE SERPL-MCNC: 115 MG/DL (ref 65–99)
GLUCOSE SERPL-MCNC: 115 MG/DL (ref 65–99)
GLUCOSE SERPL-MCNC: 121 MG/DL (ref 65–99)
GLUCOSE SERPL-MCNC: 121 MG/DL (ref 65–99)
GLUCOSE SERPL-MCNC: 123 MG/DL (ref 65–99)
GLUCOSE SERPL-MCNC: 128 MG/DL (ref 65–99)
GLUCOSE SERPL-MCNC: 130 MG/DL (ref 65–99)
GLUCOSE SERPL-MCNC: 130 MG/DL (ref 65–99)
GLUCOSE SERPL-MCNC: 79 MG/DL (ref 65–99)
GLUCOSE SERPL-MCNC: 86 MG/DL (ref 65–99)
HCT VFR BLD AUTO: 35.5 % (ref 37.5–51)
HCT VFR BLD AUTO: 35.8 % (ref 37.5–51)
HCT VFR BLD AUTO: 36.4 % (ref 37.5–51)
HCT VFR BLD AUTO: 36.4 % (ref 37.5–51)
HCT VFR BLD AUTO: 36.7 % (ref 37.5–51)
HCT VFR BLD AUTO: 37.8 % (ref 37.5–51)
HCT VFR BLD AUTO: 38.1 % (ref 37.5–51)
HCT VFR BLD AUTO: 38.5 % (ref 37.5–51)
HCT VFR BLD AUTO: 38.8 % (ref 37.5–51)
HCT VFR BLD AUTO: 38.9 % (ref 37.5–51)
HCT VFR BLD AUTO: 39.3 % (ref 37.5–51)
HCT VFR BLD AUTO: 42.3 % (ref 37.5–51)
HCT VFR BLD AUTO: 43.2 % (ref 37.5–51)
HCT VFR BLD AUTO: 46.4 % (ref 37.5–51)
HCT VFR BLD AUTO: 46.7 % (ref 37.5–51)
HCT VFR BLD AUTO: 48.9 % (ref 37.5–51)
HGB BLD-MCNC: 11.3 G/DL (ref 13–17.7)
HGB BLD-MCNC: 11.8 G/DL (ref 13–17.7)
HGB BLD-MCNC: 11.8 G/DL (ref 13–17.7)
HGB BLD-MCNC: 11.9 G/DL (ref 13–17.7)
HGB BLD-MCNC: 12 G/DL (ref 13–17.7)
HGB BLD-MCNC: 12 G/DL (ref 13–17.7)
HGB BLD-MCNC: 12.3 G/DL (ref 13–17.7)
HGB BLD-MCNC: 12.4 G/DL (ref 13–17.7)
HGB BLD-MCNC: 12.5 G/DL (ref 13–17.7)
HGB BLD-MCNC: 12.6 G/DL (ref 13–17.7)
HGB BLD-MCNC: 12.8 G/DL (ref 13–17.7)
HGB BLD-MCNC: 13.4 G/DL (ref 13–17.7)
HGB BLD-MCNC: 14.1 G/DL (ref 13–17.7)
HGB BLD-MCNC: 14.7 G/DL (ref 13–17.7)
HGB BLD-MCNC: 15.1 G/DL (ref 13–17.7)
HGB BLD-MCNC: 15.5 G/DL (ref 13–17.7)
HOLD SPECIMEN: NORMAL
IMM GRANULOCYTES # BLD AUTO: 0.01 10*3/MM3 (ref 0–0.05)
IMM GRANULOCYTES # BLD AUTO: 0.01 10*3/MM3 (ref 0–0.05)
IMM GRANULOCYTES # BLD AUTO: 0.02 10*3/MM3 (ref 0–0.05)
IMM GRANULOCYTES # BLD AUTO: 0.03 10*3/MM3 (ref 0–0.05)
IMM GRANULOCYTES # BLD AUTO: 0.06 10*3/MM3 (ref 0–0.05)
IMM GRANULOCYTES # BLD AUTO: 0.07 10*3/MM3 (ref 0–0.05)
IMM GRANULOCYTES # BLD AUTO: 0.09 10*3/MM3 (ref 0–0.05)
IMM GRANULOCYTES # BLD AUTO: 0.19 10*3/MM3 (ref 0–0.05)
IMM GRANULOCYTES NFR BLD AUTO: 0.2 % (ref 0–0.5)
IMM GRANULOCYTES NFR BLD AUTO: 0.3 % (ref 0–0.5)
IMM GRANULOCYTES NFR BLD AUTO: 0.4 % (ref 0–0.5)
IMM GRANULOCYTES NFR BLD AUTO: 0.4 % (ref 0–0.5)
IMM GRANULOCYTES NFR BLD AUTO: 0.5 % (ref 0–0.5)
IMM GRANULOCYTES NFR BLD AUTO: 0.6 % (ref 0–0.5)
IMM GRANULOCYTES NFR BLD AUTO: 0.7 % (ref 0–0.5)
IMM GRANULOCYTES NFR BLD AUTO: 0.8 % (ref 0–0.5)
IMM GRANULOCYTES NFR BLD AUTO: 0.8 % (ref 0–0.5)
IMM GRANULOCYTES NFR BLD AUTO: 0.9 % (ref 0–0.5)
IMM GRANULOCYTES NFR BLD AUTO: 1.1 % (ref 0–0.5)
IMM GRANULOCYTES NFR BLD AUTO: 1.3 % (ref 0–0.5)
IMM GRANULOCYTES NFR BLD AUTO: 3.4 % (ref 0–0.5)
IRON 24H UR-MRATE: 31 MCG/DL (ref 59–158)
IRON 24H UR-MRATE: 98 MCG/DL (ref 59–158)
IRON SATN MFR SERPL: 14 % (ref 20–50)
IRON SATN MFR SERPL: 38 % (ref 20–50)
LYMPHOCYTES # BLD AUTO: 0.52 10*3/MM3 (ref 0.7–3.1)
LYMPHOCYTES # BLD AUTO: 0.54 10*3/MM3 (ref 0.7–3.1)
LYMPHOCYTES # BLD AUTO: 0.57 10*3/MM3 (ref 0.7–3.1)
LYMPHOCYTES # BLD AUTO: 0.73 10*3/MM3 (ref 0.7–3.1)
LYMPHOCYTES # BLD AUTO: 0.76 10*3/MM3 (ref 0.7–3.1)
LYMPHOCYTES # BLD AUTO: 0.8 10*3/MM3 (ref 0.7–3.1)
LYMPHOCYTES # BLD AUTO: 0.81 10*3/MM3 (ref 0.7–3.1)
LYMPHOCYTES # BLD AUTO: 0.83 10*3/MM3 (ref 0.7–3.1)
LYMPHOCYTES # BLD AUTO: 0.88 10*3/MM3 (ref 0.7–3.1)
LYMPHOCYTES # BLD AUTO: 0.95 10*3/MM3 (ref 0.7–3.1)
LYMPHOCYTES # BLD AUTO: 1.02 10*3/MM3 (ref 0.7–3.1)
LYMPHOCYTES # BLD AUTO: 1.1 10*3/MM3 (ref 0.7–3.1)
LYMPHOCYTES # BLD AUTO: 1.23 10*3/MM3 (ref 0.7–3.1)
LYMPHOCYTES # BLD AUTO: 1.59 10*3/MM3 (ref 0.7–3.1)
LYMPHOCYTES # BLD AUTO: 1.63 10*3/MM3 (ref 0.7–3.1)
LYMPHOCYTES NFR BLD AUTO: 11.3 % (ref 19.6–45.3)
LYMPHOCYTES NFR BLD AUTO: 11.8 % (ref 19.6–45.3)
LYMPHOCYTES NFR BLD AUTO: 14.1 % (ref 19.6–45.3)
LYMPHOCYTES NFR BLD AUTO: 14.7 % (ref 19.6–45.3)
LYMPHOCYTES NFR BLD AUTO: 15.2 % (ref 19.6–45.3)
LYMPHOCYTES NFR BLD AUTO: 15.9 % (ref 19.6–45.3)
LYMPHOCYTES NFR BLD AUTO: 16.2 % (ref 19.6–45.3)
LYMPHOCYTES NFR BLD AUTO: 18.2 % (ref 19.6–45.3)
LYMPHOCYTES NFR BLD AUTO: 18.6 % (ref 19.6–45.3)
LYMPHOCYTES NFR BLD AUTO: 20.9 % (ref 19.6–45.3)
LYMPHOCYTES NFR BLD AUTO: 21.8 % (ref 19.6–45.3)
LYMPHOCYTES NFR BLD AUTO: 22.2 % (ref 19.6–45.3)
LYMPHOCYTES NFR BLD AUTO: 24.4 % (ref 19.6–45.3)
LYMPHOCYTES NFR BLD AUTO: 24.8 % (ref 19.6–45.3)
LYMPHOCYTES NFR BLD AUTO: 28.9 % (ref 19.6–45.3)
MAGNESIUM SERPL-MCNC: 2 MG/DL (ref 1.6–2.4)
MAGNESIUM SERPL-MCNC: 2.1 MG/DL (ref 1.6–2.4)
MAGNESIUM SERPL-MCNC: 2.2 MG/DL (ref 1.6–2.4)
MAGNESIUM SERPL-MCNC: 2.2 MG/DL (ref 1.6–2.4)
MAGNESIUM SERPL-MCNC: 2.3 MG/DL (ref 1.6–2.4)
MAGNESIUM SERPL-MCNC: 2.5 MG/DL (ref 1.6–2.4)
MAGNESIUM SERPL-MCNC: 2.6 MG/DL (ref 1.6–2.4)
MCH RBC QN AUTO: 28.4 PG (ref 26.6–33)
MCH RBC QN AUTO: 28.9 PG (ref 26.6–33)
MCH RBC QN AUTO: 29.1 PG (ref 26.6–33)
MCH RBC QN AUTO: 29.7 PG (ref 26.6–33)
MCH RBC QN AUTO: 29.8 PG (ref 26.6–33)
MCH RBC QN AUTO: 30.1 PG (ref 26.6–33)
MCH RBC QN AUTO: 30.7 PG (ref 26.6–33)
MCH RBC QN AUTO: 31.2 PG (ref 26.6–33)
MCH RBC QN AUTO: 31.2 PG (ref 26.6–33)
MCH RBC QN AUTO: 31.5 PG (ref 26.6–33)
MCH RBC QN AUTO: 31.5 PG (ref 26.6–33)
MCH RBC QN AUTO: 31.6 PG (ref 26.6–33)
MCH RBC QN AUTO: 31.8 PG (ref 26.6–33)
MCH RBC QN AUTO: 32.6 PG (ref 26.6–33)
MCHC RBC AUTO-ENTMCNC: 30.3 G/DL (ref 31.5–35.7)
MCHC RBC AUTO-ENTMCNC: 31.7 G/DL (ref 31.5–35.7)
MCHC RBC AUTO-ENTMCNC: 31.8 G/DL (ref 31.5–35.7)
MCHC RBC AUTO-ENTMCNC: 32 G/DL (ref 31.5–35.7)
MCHC RBC AUTO-ENTMCNC: 32.3 G/DL (ref 31.5–35.7)
MCHC RBC AUTO-ENTMCNC: 32.3 G/DL (ref 31.5–35.7)
MCHC RBC AUTO-ENTMCNC: 32.5 G/DL (ref 31.5–35.7)
MCHC RBC AUTO-ENTMCNC: 32.6 G/DL (ref 31.5–35.7)
MCHC RBC AUTO-ENTMCNC: 32.6 G/DL (ref 31.5–35.7)
MCHC RBC AUTO-ENTMCNC: 32.7 G/DL (ref 31.5–35.7)
MCHC RBC AUTO-ENTMCNC: 32.7 G/DL (ref 31.5–35.7)
MCHC RBC AUTO-ENTMCNC: 33 G/DL (ref 31.5–35.7)
MCHC RBC AUTO-ENTMCNC: 33 G/DL (ref 31.5–35.7)
MCHC RBC AUTO-ENTMCNC: 33.3 G/DL (ref 31.5–35.7)
MCV RBC AUTO: 100.3 FL (ref 79–97)
MCV RBC AUTO: 89.6 FL (ref 79–97)
MCV RBC AUTO: 90.4 FL (ref 79–97)
MCV RBC AUTO: 91.3 FL (ref 79–97)
MCV RBC AUTO: 91.4 FL (ref 79–97)
MCV RBC AUTO: 91.7 FL (ref 79–97)
MCV RBC AUTO: 91.9 FL (ref 79–97)
MCV RBC AUTO: 93.9 FL (ref 79–97)
MCV RBC AUTO: 94.4 FL (ref 79–97)
MCV RBC AUTO: 95.3 FL (ref 79–97)
MCV RBC AUTO: 95.5 FL (ref 79–97)
MCV RBC AUTO: 96.6 FL (ref 79–97)
MCV RBC AUTO: 97 FL (ref 79–97)
MCV RBC AUTO: 97.5 FL (ref 79–97)
MCV RBC AUTO: 97.7 FL (ref 79–97)
MCV RBC AUTO: 99.2 FL (ref 79–97)
MONOCYTES # BLD AUTO: 0.17 10*3/MM3 (ref 0.1–0.9)
MONOCYTES # BLD AUTO: 0.25 10*3/MM3 (ref 0.1–0.9)
MONOCYTES # BLD AUTO: 0.26 10*3/MM3 (ref 0.1–0.9)
MONOCYTES # BLD AUTO: 0.33 10*3/MM3 (ref 0.1–0.9)
MONOCYTES # BLD AUTO: 0.34 10*3/MM3 (ref 0.1–0.9)
MONOCYTES # BLD AUTO: 0.36 10*3/MM3 (ref 0.1–0.9)
MONOCYTES # BLD AUTO: 0.37 10*3/MM3 (ref 0.1–0.9)
MONOCYTES # BLD AUTO: 0.39 10*3/MM3 (ref 0.1–0.9)
MONOCYTES # BLD AUTO: 0.48 10*3/MM3 (ref 0.1–0.9)
MONOCYTES # BLD AUTO: 0.49 10*3/MM3 (ref 0.1–0.9)
MONOCYTES # BLD AUTO: 0.49 10*3/MM3 (ref 0.1–0.9)
MONOCYTES # BLD AUTO: 0.51 10*3/MM3 (ref 0.1–0.9)
MONOCYTES # BLD AUTO: 0.53 10*3/MM3 (ref 0.1–0.9)
MONOCYTES # BLD AUTO: 0.62 10*3/MM3 (ref 0.1–0.9)
MONOCYTES # BLD AUTO: 0.83 10*3/MM3 (ref 0.1–0.9)
MONOCYTES NFR BLD AUTO: 11.2 % (ref 5–12)
MONOCYTES NFR BLD AUTO: 13.4 % (ref 5–12)
MONOCYTES NFR BLD AUTO: 14.5 % (ref 5–12)
MONOCYTES NFR BLD AUTO: 18.1 % (ref 5–12)
MONOCYTES NFR BLD AUTO: 5.5 % (ref 5–12)
MONOCYTES NFR BLD AUTO: 5.7 % (ref 5–12)
MONOCYTES NFR BLD AUTO: 5.9 % (ref 5–12)
MONOCYTES NFR BLD AUTO: 6.4 % (ref 5–12)
MONOCYTES NFR BLD AUTO: 6.6 % (ref 5–12)
MONOCYTES NFR BLD AUTO: 6.9 % (ref 5–12)
MONOCYTES NFR BLD AUTO: 7.1 % (ref 5–12)
MONOCYTES NFR BLD AUTO: 7.2 % (ref 5–12)
MONOCYTES NFR BLD AUTO: 7.2 % (ref 5–12)
MONOCYTES NFR BLD AUTO: 7.7 % (ref 5–12)
MONOCYTES NFR BLD AUTO: 7.9 % (ref 5–12)
NEUTROPHILS NFR BLD AUTO: 0.92 10*3/MM3 (ref 1.7–7)
NEUTROPHILS NFR BLD AUTO: 1.32 10*3/MM3 (ref 1.7–7)
NEUTROPHILS NFR BLD AUTO: 1.56 10*3/MM3 (ref 1.7–7)
NEUTROPHILS NFR BLD AUTO: 2.4 10*3/MM3 (ref 1.7–7)
NEUTROPHILS NFR BLD AUTO: 2.9 10*3/MM3 (ref 1.7–7)
NEUTROPHILS NFR BLD AUTO: 3.78 10*3/MM3 (ref 1.7–7)
NEUTROPHILS NFR BLD AUTO: 3.78 10*3/MM3 (ref 1.7–7)
NEUTROPHILS NFR BLD AUTO: 4.21 10*3/MM3 (ref 1.7–7)
NEUTROPHILS NFR BLD AUTO: 4.44 10*3/MM3 (ref 1.7–7)
NEUTROPHILS NFR BLD AUTO: 4.7 10*3/MM3 (ref 1.7–7)
NEUTROPHILS NFR BLD AUTO: 4.71 10*3/MM3 (ref 1.7–7)
NEUTROPHILS NFR BLD AUTO: 4.87 10*3/MM3 (ref 1.7–7)
NEUTROPHILS NFR BLD AUTO: 4.91 10*3/MM3 (ref 1.7–7)
NEUTROPHILS NFR BLD AUTO: 49.1 % (ref 42.7–76)
NEUTROPHILS NFR BLD AUTO: 5.46 10*3/MM3 (ref 1.7–7)
NEUTROPHILS NFR BLD AUTO: 56.5 % (ref 42.7–76)
NEUTROPHILS NFR BLD AUTO: 6.91 10*3/MM3 (ref 1.7–7)
NEUTROPHILS NFR BLD AUTO: 63.1 % (ref 42.7–76)
NEUTROPHILS NFR BLD AUTO: 65.3 % (ref 42.7–76)
NEUTROPHILS NFR BLD AUTO: 66 % (ref 42.7–76)
NEUTROPHILS NFR BLD AUTO: 67.3 % (ref 42.7–76)
NEUTROPHILS NFR BLD AUTO: 68.6 % (ref 42.7–76)
NEUTROPHILS NFR BLD AUTO: 68.7 % (ref 42.7–76)
NEUTROPHILS NFR BLD AUTO: 69.8 % (ref 42.7–76)
NEUTROPHILS NFR BLD AUTO: 71.4 % (ref 42.7–76)
NEUTROPHILS NFR BLD AUTO: 73.9 % (ref 42.7–76)
NEUTROPHILS NFR BLD AUTO: 75.3 % (ref 42.7–76)
NEUTROPHILS NFR BLD AUTO: 77.5 % (ref 42.7–76)
NEUTROPHILS NFR BLD AUTO: 78.2 % (ref 42.7–76)
NEUTROPHILS NFR BLD AUTO: 79.7 % (ref 42.7–76)
NRBC BLD AUTO-RTO: 0 /100 WBC (ref 0–0.2)
PHOSPHATE SERPL-MCNC: 2.7 MG/DL (ref 2.5–4.5)
PHOSPHATE SERPL-MCNC: 3.8 MG/DL (ref 2.5–4.5)
PLATELET # BLD AUTO: 120 10*3/MM3 (ref 140–450)
PLATELET # BLD AUTO: 134 10*3/MM3 (ref 140–450)
PLATELET # BLD AUTO: 164 10*3/MM3 (ref 140–450)
PLATELET # BLD AUTO: 174 10*3/MM3 (ref 140–450)
PLATELET # BLD AUTO: 175 10*3/MM3 (ref 140–450)
PLATELET # BLD AUTO: 176 10*3/MM3 (ref 140–450)
PLATELET # BLD AUTO: 180 10*3/MM3 (ref 140–450)
PLATELET # BLD AUTO: 183 10*3/MM3 (ref 140–450)
PLATELET # BLD AUTO: 201 10*3/MM3 (ref 140–450)
PLATELET # BLD AUTO: 216 10*3/MM3 (ref 140–450)
PLATELET # BLD AUTO: 269 10*3/MM3 (ref 140–450)
PLATELET # BLD AUTO: 287 10*3/MM3 (ref 140–450)
PLATELET # BLD AUTO: 291 10*3/MM3 (ref 140–450)
PLATELET # BLD AUTO: 312 10*3/MM3 (ref 140–450)
PLATELET # BLD AUTO: 59 10*3/MM3 (ref 140–450)
PLATELET # BLD AUTO: 73 10*3/MM3 (ref 140–450)
PMV BLD AUTO: 10 FL (ref 6–12)
PMV BLD AUTO: 10.1 FL (ref 6–12)
PMV BLD AUTO: 10.2 FL (ref 6–12)
PMV BLD AUTO: 8.8 FL (ref 6–12)
PMV BLD AUTO: 9.1 FL (ref 6–12)
PMV BLD AUTO: 9.2 FL (ref 6–12)
PMV BLD AUTO: 9.2 FL (ref 6–12)
PMV BLD AUTO: 9.3 FL (ref 6–12)
PMV BLD AUTO: 9.5 FL (ref 6–12)
PMV BLD AUTO: 9.6 FL (ref 6–12)
PMV BLD AUTO: 9.6 FL (ref 6–12)
PMV BLD AUTO: 9.7 FL (ref 6–12)
PMV BLD AUTO: 9.8 FL (ref 6–12)
PMV BLD AUTO: 9.8 FL (ref 6–12)
POTASSIUM SERPL-SCNC: 3.8 MMOL/L (ref 3.5–5.2)
POTASSIUM SERPL-SCNC: 4.3 MMOL/L (ref 3.5–5.2)
POTASSIUM SERPL-SCNC: 4.4 MMOL/L (ref 3.5–5.2)
POTASSIUM SERPL-SCNC: 4.5 MMOL/L (ref 3.5–5.2)
POTASSIUM SERPL-SCNC: 4.6 MMOL/L (ref 3.5–5.2)
POTASSIUM SERPL-SCNC: 4.7 MMOL/L (ref 3.5–5.2)
POTASSIUM SERPL-SCNC: 4.7 MMOL/L (ref 3.5–5.2)
POTASSIUM SERPL-SCNC: 4.8 MMOL/L (ref 3.5–5.2)
POTASSIUM SERPL-SCNC: 4.9 MMOL/L (ref 3.5–5.2)
PROT SERPL-MCNC: 6.3 G/DL (ref 6–8.5)
PROT SERPL-MCNC: 6.5 G/DL (ref 6–8.5)
PROT SERPL-MCNC: 6.6 G/DL (ref 6–8.5)
PROT SERPL-MCNC: 6.6 G/DL (ref 6–8.5)
PROT SERPL-MCNC: 6.7 G/DL (ref 6–8.5)
PROT SERPL-MCNC: 6.8 G/DL (ref 6–8.5)
PROT SERPL-MCNC: 6.8 G/DL (ref 6–8.5)
PROT SERPL-MCNC: 6.9 G/DL (ref 6–8.5)
PROT SERPL-MCNC: 7.4 G/DL (ref 6–8.5)
PROT SERPL-MCNC: 7.5 G/DL (ref 6–8.5)
PROT SERPL-MCNC: 7.6 G/DL (ref 6–8.5)
PROT SERPL-MCNC: 7.6 G/DL (ref 6–8.5)
QT INTERVAL: 384 MS
QTC INTERVAL: 426 MS
RBC # BLD AUTO: 3.75 10*6/MM3 (ref 4.14–5.8)
RBC # BLD AUTO: 3.76 10*6/MM3 (ref 4.14–5.8)
RBC # BLD AUTO: 3.77 10*6/MM3 (ref 4.14–5.8)
RBC # BLD AUTO: 3.82 10*6/MM3 (ref 4.14–5.8)
RBC # BLD AUTO: 3.84 10*6/MM3 (ref 4.14–5.8)
RBC # BLD AUTO: 3.9 10*6/MM3 (ref 4.14–5.8)
RBC # BLD AUTO: 3.91 10*6/MM3 (ref 4.14–5.8)
RBC # BLD AUTO: 3.92 10*6/MM3 (ref 4.14–5.8)
RBC # BLD AUTO: 3.98 10*6/MM3 (ref 4.14–5.8)
RBC # BLD AUTO: 4.05 10*6/MM3 (ref 4.14–5.8)
RBC # BLD AUTO: 4.18 10*6/MM3 (ref 4.14–5.8)
RBC # BLD AUTO: 4.63 10*6/MM3 (ref 4.14–5.8)
RBC # BLD AUTO: 4.73 10*6/MM3 (ref 4.14–5.8)
RBC # BLD AUTO: 5.06 10*6/MM3 (ref 4.14–5.8)
RBC # BLD AUTO: 5.08 10*6/MM3 (ref 4.14–5.8)
RBC # BLD AUTO: 5.46 10*6/MM3 (ref 4.14–5.8)
RETICS # AUTO: 0.02 10*6/MM3 (ref 0.02–0.13)
RETICS/RBC NFR AUTO: 0.36 % (ref 0.7–1.9)
SARS-COV-2 ORF1AB RESP QL NAA+PROBE: DETECTED
SARS-COV-2 RNA PNL SPEC NAA+PROBE: NOT DETECTED
SODIUM SERPL-SCNC: 138 MMOL/L (ref 136–145)
SODIUM SERPL-SCNC: 138 MMOL/L (ref 136–145)
SODIUM SERPL-SCNC: 139 MMOL/L (ref 136–145)
SODIUM SERPL-SCNC: 140 MMOL/L (ref 136–145)
SODIUM SERPL-SCNC: 141 MMOL/L (ref 136–145)
SODIUM SERPL-SCNC: 142 MMOL/L (ref 136–145)
SODIUM SERPL-SCNC: 143 MMOL/L (ref 136–145)
TIBC SERPL-MCNC: 224 MCG/DL (ref 298–536)
TIBC SERPL-MCNC: 256 MCG/DL (ref 298–536)
TRANSFERRIN SERPL-MCNC: 150 MG/DL (ref 200–360)
TRANSFERRIN SERPL-MCNC: 172 MG/DL (ref 200–360)
VIT B12 BLD-MCNC: 401 PG/ML (ref 211–946)
WBC NRBC COR # BLD: 1.87 10*3/MM3 (ref 3.4–10.8)
WBC NRBC COR # BLD: 2.34 10*3/MM3 (ref 3.4–10.8)
WBC NRBC COR # BLD: 2.39 10*3/MM3 (ref 3.4–10.8)
WBC NRBC COR # BLD: 3.63 10*3/MM3 (ref 3.4–10.8)
WBC NRBC COR # BLD: 4.59 10*3/MM3 (ref 3.4–10.8)
WBC NRBC COR # BLD: 5.12 10*3/MM3 (ref 3.4–10.8)
WBC NRBC COR # BLD: 5.52 10*3/MM3 (ref 3.4–10.8)
WBC NRBC COR # BLD: 5.9 10*3/MM3 (ref 3.4–10.8)
WBC NRBC COR # BLD: 6.23 10*3/MM3 (ref 3.4–10.8)
WBC NRBC COR # BLD: 6.24 10*3/MM3 (ref 3.4–10.8)
WBC NRBC COR # BLD: 6.58 10*3/MM3 (ref 3.4–10.8)
WBC NRBC COR # BLD: 6.75 10*3/MM3 (ref 3.4–10.8)
WBC NRBC COR # BLD: 7.05 10*3/MM3 (ref 3.4–10.8)
WBC NRBC COR # BLD: 7.15 10*3/MM3 (ref 3.4–10.8)
WBC NRBC COR # BLD: 8.67 10*3/MM3 (ref 3.4–10.8)
WBC NRBC COR # BLD: 8.68 10*3/MM3 (ref 3.4–10.8)
WHOLE BLOOD HOLD COAG: NORMAL
WHOLE BLOOD HOLD SPECIMEN: NORMAL

## 2022-01-01 PROCEDURE — 77334 RADIATION TREATMENT AID(S): CPT | Performed by: RADIOLOGY

## 2022-01-01 PROCEDURE — 96365 THER/PROPH/DIAG IV INF INIT: CPT

## 2022-01-01 PROCEDURE — G0463 HOSPITAL OUTPT CLINIC VISIT: HCPCS | Performed by: RADIOLOGY

## 2022-01-01 PROCEDURE — 25010000002 DEXAMETHASONE SODIUM PHOSPHATE 100 MG/10ML SOLUTION: Performed by: INTERNAL MEDICINE

## 2022-01-01 PROCEDURE — 96366 THER/PROPH/DIAG IV INF ADDON: CPT

## 2022-01-01 PROCEDURE — 85045 AUTOMATED RETICULOCYTE COUNT: CPT

## 2022-01-01 PROCEDURE — 96368 THER/DIAG CONCURRENT INF: CPT

## 2022-01-01 PROCEDURE — A9270 NON-COVERED ITEM OR SERVICE: HCPCS | Performed by: STUDENT IN AN ORGANIZED HEALTH CARE EDUCATION/TRAINING PROGRAM

## 2022-01-01 PROCEDURE — 25010000002 MAGNESIUM SULFATE PER 500 MG OF MAGNESIUM: Performed by: INTERNAL MEDICINE

## 2022-01-01 PROCEDURE — 96375 TX/PRO/DX INJ NEW DRUG ADDON: CPT

## 2022-01-01 PROCEDURE — 25010000002 FERRIC CARBOXYMALTOSE 750 MG/15ML SOLUTION 15 ML VIAL: Performed by: INTERNAL MEDICINE

## 2022-01-01 PROCEDURE — 63710000001 DOCUSATE SODIUM 100 MG/10ML LIQUID: Performed by: STUDENT IN AN ORGANIZED HEALTH CARE EDUCATION/TRAINING PROGRAM

## 2022-01-01 PROCEDURE — 25010000002 PALONOSETRON PER 25 MCG: Performed by: INTERNAL MEDICINE

## 2022-01-01 PROCEDURE — 83735 ASSAY OF MAGNESIUM: CPT

## 2022-01-01 PROCEDURE — 92610 EVALUATE SWALLOWING FUNCTION: CPT | Performed by: SPEECH-LANGUAGE PATHOLOGIST

## 2022-01-01 PROCEDURE — 84466 ASSAY OF TRANSFERRIN: CPT

## 2022-01-01 PROCEDURE — 85025 COMPLETE CBC W/AUTO DIFF WBC: CPT

## 2022-01-01 PROCEDURE — 77386: CPT | Performed by: RADIOLOGY

## 2022-01-01 PROCEDURE — A9552 F18 FDG: HCPCS | Performed by: OTOLARYNGOLOGY

## 2022-01-01 PROCEDURE — 25010000002 IOPAMIDOL 61 % SOLUTION: Performed by: OTOLARYNGOLOGY

## 2022-01-01 PROCEDURE — C9803 HOPD COVID-19 SPEC COLLECT: HCPCS

## 2022-01-01 PROCEDURE — 93005 ELECTROCARDIOGRAM TRACING: CPT

## 2022-01-01 PROCEDURE — 43246 EGD PLACE GASTROSTOMY TUBE: CPT | Performed by: STUDENT IN AN ORGANIZED HEALTH CARE EDUCATION/TRAINING PROGRAM

## 2022-01-01 PROCEDURE — 0 FLUDEOXYGLUCOSE F18 SOLUTION: Performed by: OTOLARYNGOLOGY

## 2022-01-01 PROCEDURE — 92611 MOTION FLUOROSCOPY/SWALLOW: CPT

## 2022-01-01 PROCEDURE — 97140 MANUAL THERAPY 1/> REGIONS: CPT | Performed by: PHYSICAL THERAPIST

## 2022-01-01 PROCEDURE — 96372 THER/PROPH/DIAG INJ SC/IM: CPT

## 2022-01-01 PROCEDURE — 92526 ORAL FUNCTION THERAPY: CPT | Performed by: SPEECH-LANGUAGE PATHOLOGIST

## 2022-01-01 PROCEDURE — 82607 VITAMIN B-12: CPT

## 2022-01-01 PROCEDURE — 36415 COLL VENOUS BLD VENIPUNCTURE: CPT

## 2022-01-01 PROCEDURE — 80053 COMPREHEN METABOLIC PANEL: CPT

## 2022-01-01 PROCEDURE — 31575 DIAGNOSTIC LARYNGOSCOPY: CPT | Performed by: OTOLARYNGOLOGY

## 2022-01-01 PROCEDURE — 92526 ORAL FUNCTION THERAPY: CPT

## 2022-01-01 PROCEDURE — 25010000002 POTASSIUM CHLORIDE PER 2 MEQ OF POTASSIUM: Performed by: INTERNAL MEDICINE

## 2022-01-01 PROCEDURE — 25010000002 ONDANSETRON PER 1 MG: Performed by: NURSE ANESTHETIST, CERTIFIED REGISTERED

## 2022-01-01 PROCEDURE — 96416 CHEMO PROLONG INFUSE W/PUMP: CPT

## 2022-01-01 PROCEDURE — 25010000002 FUROSEMIDE PER 20 MG: Performed by: INTERNAL MEDICINE

## 2022-01-01 PROCEDURE — 80053 COMPREHEN METABOLIC PANEL: CPT | Performed by: STUDENT IN AN ORGANIZED HEALTH CARE EDUCATION/TRAINING PROGRAM

## 2022-01-01 PROCEDURE — 77336 RADIATION PHYSICS CONSULT: CPT | Performed by: RADIOLOGY

## 2022-01-01 PROCEDURE — 70491 CT SOFT TISSUE NECK W/DYE: CPT

## 2022-01-01 PROCEDURE — 96415 CHEMO IV INFUSION ADDL HR: CPT

## 2022-01-01 PROCEDURE — 99211 OFF/OP EST MAY X REQ PHY/QHP: CPT | Performed by: INTERNAL MEDICINE

## 2022-01-01 PROCEDURE — 74230 X-RAY XM SWLNG FUNCJ C+: CPT

## 2022-01-01 PROCEDURE — 63710000001 OXYCODONE 5 MG/5ML SOLUTION: Performed by: STUDENT IN AN ORGANIZED HEALTH CARE EDUCATION/TRAINING PROGRAM

## 2022-01-01 PROCEDURE — 77301 RADIOTHERAPY DOSE PLAN IMRT: CPT | Performed by: RADIOLOGY

## 2022-01-01 PROCEDURE — 82728 ASSAY OF FERRITIN: CPT

## 2022-01-01 PROCEDURE — 25010000002 ENOXAPARIN PER 10 MG: Performed by: STUDENT IN AN ORGANIZED HEALTH CARE EDUCATION/TRAINING PROGRAM

## 2022-01-01 PROCEDURE — 96367 TX/PROPH/DG ADDL SEQ IV INF: CPT

## 2022-01-01 PROCEDURE — G0378 HOSPITAL OBSERVATION PER HR: HCPCS

## 2022-01-01 PROCEDURE — 83540 ASSAY OF IRON: CPT

## 2022-01-01 PROCEDURE — 99213 OFFICE O/P EST LOW 20 MIN: CPT | Performed by: OTOLARYNGOLOGY

## 2022-01-01 PROCEDURE — 96413 CHEMO IV INFUSION 1 HR: CPT

## 2022-01-01 PROCEDURE — 99204 OFFICE O/P NEW MOD 45 MIN: CPT | Performed by: STUDENT IN AN ORGANIZED HEALTH CARE EDUCATION/TRAINING PROGRAM

## 2022-01-01 PROCEDURE — C1769 GUIDE WIRE: HCPCS | Performed by: STUDENT IN AN ORGANIZED HEALTH CARE EDUCATION/TRAINING PROGRAM

## 2022-01-01 PROCEDURE — 82746 ASSAY OF FOLIC ACID SERUM: CPT

## 2022-01-01 PROCEDURE — 99214 OFFICE O/P EST MOD 30 MIN: CPT | Performed by: OTOLARYNGOLOGY

## 2022-01-01 PROCEDURE — 25010000002 FOSAPREPITANT PER 1 MG: Performed by: INTERNAL MEDICINE

## 2022-01-01 PROCEDURE — 77300 RADIATION THERAPY DOSE PLAN: CPT | Performed by: RADIOLOGY

## 2022-01-01 PROCEDURE — 97162 PT EVAL MOD COMPLEX 30 MIN: CPT | Performed by: PHYSICAL THERAPIST

## 2022-01-01 PROCEDURE — 96376 TX/PRO/DX INJ SAME DRUG ADON: CPT

## 2022-01-01 PROCEDURE — 90662 IIV NO PRSV INCREASED AG IM: CPT | Performed by: INTERNAL MEDICINE

## 2022-01-01 PROCEDURE — G0463 HOSPITAL OUTPT CLINIC VISIT: HCPCS

## 2022-01-01 PROCEDURE — 99214 OFFICE O/P EST MOD 30 MIN: CPT | Performed by: INTERNAL MEDICINE

## 2022-01-01 PROCEDURE — 63710000001 BARIUM SULFATE 40 % SUSPENSION: Performed by: STUDENT IN AN ORGANIZED HEALTH CARE EDUCATION/TRAINING PROGRAM

## 2022-01-01 PROCEDURE — 25010000002 CISPLATIN PER 50 MG: Performed by: INTERNAL MEDICINE

## 2022-01-01 PROCEDURE — 99283 EMERGENCY DEPT VISIT LOW MDM: CPT

## 2022-01-01 PROCEDURE — 97110 THERAPEUTIC EXERCISES: CPT | Performed by: PHYSICAL THERAPIST

## 2022-01-01 PROCEDURE — 25010000002 IOPAMIDOL 61 % SOLUTION: Performed by: STUDENT IN AN ORGANIZED HEALTH CARE EDUCATION/TRAINING PROGRAM

## 2022-01-01 PROCEDURE — 25010000002 PROPOFOL 1000 MG/100ML EMULSION: Performed by: NURSE ANESTHETIST, CERTIFIED REGISTERED

## 2022-01-01 PROCEDURE — 78815 PET IMAGE W/CT SKULL-THIGH: CPT

## 2022-01-01 PROCEDURE — 99224 PR SBSQ OBSERVATION CARE/DAY 15 MINUTES: CPT | Performed by: STUDENT IN AN ORGANIZED HEALTH CARE EDUCATION/TRAINING PROGRAM

## 2022-01-01 PROCEDURE — 25010000002 CEFAZOLIN PER 500 MG: Performed by: STUDENT IN AN ORGANIZED HEALTH CARE EDUCATION/TRAINING PROGRAM

## 2022-01-01 PROCEDURE — 80048 BASIC METABOLIC PNL TOTAL CA: CPT

## 2022-01-01 PROCEDURE — 63710000001 ACETAMINOPHEN 160 MG/5ML SOLUTION: Performed by: STUDENT IN AN ORGANIZED HEALTH CARE EDUCATION/TRAINING PROGRAM

## 2022-01-01 PROCEDURE — 84100 ASSAY OF PHOSPHORUS: CPT | Performed by: STUDENT IN AN ORGANIZED HEALTH CARE EDUCATION/TRAINING PROGRAM

## 2022-01-01 PROCEDURE — 99205 OFFICE O/P NEW HI 60 MIN: CPT | Performed by: INTERNAL MEDICINE

## 2022-01-01 PROCEDURE — U0005 INFEC AGEN DETEC AMPLI PROBE: HCPCS

## 2022-01-01 PROCEDURE — 99217 PR OBSERVATION CARE DISCHARGE MANAGEMENT: CPT | Performed by: STUDENT IN AN ORGANIZED HEALTH CARE EDUCATION/TRAINING PROGRAM

## 2022-01-01 PROCEDURE — 87635 SARS-COV-2 COVID-19 AMP PRB: CPT

## 2022-01-01 PROCEDURE — 25010000002 DIPHENHYDRAMINE PER 50 MG: Performed by: SPECIALIST

## 2022-01-01 PROCEDURE — 77338 DESIGN MLC DEVICE FOR IMRT: CPT | Performed by: RADIOLOGY

## 2022-01-01 PROCEDURE — 0 LIDOCAINE 1 % SOLUTION: Performed by: STUDENT IN AN ORGANIZED HEALTH CARE EDUCATION/TRAINING PROGRAM

## 2022-01-01 PROCEDURE — 83735 ASSAY OF MAGNESIUM: CPT | Performed by: STUDENT IN AN ORGANIZED HEALTH CARE EDUCATION/TRAINING PROGRAM

## 2022-01-01 PROCEDURE — 25010000002 DEXAMETHASONE PER 1 MG: Performed by: NURSE ANESTHETIST, CERTIFIED REGISTERED

## 2022-01-01 PROCEDURE — U0004 COV-19 TEST NON-CDC HGH THRU: HCPCS

## 2022-01-01 PROCEDURE — 92610 EVALUATE SWALLOWING FUNCTION: CPT

## 2022-01-01 PROCEDURE — 80053 COMPREHEN METABOLIC PANEL: CPT | Performed by: INTERNAL MEDICINE

## 2022-01-01 PROCEDURE — 93010 ELECTROCARDIOGRAM REPORT: CPT | Performed by: INTERNAL MEDICINE

## 2022-01-01 PROCEDURE — 85027 COMPLETE CBC AUTOMATED: CPT | Performed by: STUDENT IN AN ORGANIZED HEALTH CARE EDUCATION/TRAINING PROGRAM

## 2022-01-01 PROCEDURE — G0008 ADMIN INFLUENZA VIRUS VAC: HCPCS | Performed by: INTERNAL MEDICINE

## 2022-01-01 RX ORDER — BUPIVACAINE HCL/0.9 % NACL/PF 0.1 %
2 PLASTIC BAG, INJECTION (ML) EPIDURAL ONCE
Status: COMPLETED | OUTPATIENT
Start: 2022-01-01 | End: 2022-01-01

## 2022-01-01 RX ORDER — DIPHENHYDRAMINE HYDROCHLORIDE 50 MG/ML
50 INJECTION INTRAMUSCULAR; INTRAVENOUS AS NEEDED
Status: CANCELLED | OUTPATIENT
Start: 2022-01-01

## 2022-01-01 RX ORDER — FENTANYL CITRATE 50 UG/ML
25 INJECTION, SOLUTION INTRAMUSCULAR; INTRAVENOUS
Status: DISCONTINUED | OUTPATIENT
Start: 2022-01-01 | End: 2022-01-01 | Stop reason: HOSPADM

## 2022-01-01 RX ORDER — ONDANSETRON HYDROCHLORIDE 8 MG/1
8 TABLET, FILM COATED ORAL EVERY 8 HOURS PRN
Qty: 90 TABLET | Refills: 2 | Status: ON HOLD | OUTPATIENT
Start: 2022-01-01 | End: 2022-01-01

## 2022-01-01 RX ORDER — FUROSEMIDE 10 MG/ML
20 INJECTION INTRAMUSCULAR; INTRAVENOUS ONCE
Status: CANCELLED
Start: 2022-01-01 | End: 2022-01-01

## 2022-01-01 RX ORDER — PALONOSETRON 0.05 MG/ML
0.25 INJECTION, SOLUTION INTRAVENOUS ONCE
Status: CANCELLED | OUTPATIENT
Start: 2022-01-01

## 2022-01-01 RX ORDER — FLUCONAZOLE 100 MG/1
100 TABLET ORAL DAILY
Qty: 8 TABLET | Refills: 0 | Status: SHIPPED | OUTPATIENT
Start: 2022-01-01 | End: 2022-01-01

## 2022-01-01 RX ORDER — DIPHENHYDRAMINE HYDROCHLORIDE 50 MG/ML
50 INJECTION INTRAMUSCULAR; INTRAVENOUS AS NEEDED
Status: DISCONTINUED | OUTPATIENT
Start: 2022-01-01 | End: 2022-01-01 | Stop reason: HOSPADM

## 2022-01-01 RX ORDER — OLANZAPINE 5 MG/1
5 TABLET ORAL ONCE
Status: DISCONTINUED | OUTPATIENT
Start: 2022-01-01 | End: 2022-01-01 | Stop reason: HOSPADM

## 2022-01-01 RX ORDER — IBUPROFEN 600 MG/1
600 TABLET ORAL ONCE AS NEEDED
Status: DISCONTINUED | OUTPATIENT
Start: 2022-01-01 | End: 2022-01-01 | Stop reason: HOSPADM

## 2022-01-01 RX ORDER — DEXTROSE MONOHYDRATE 25 G/50ML
12.5 INJECTION, SOLUTION INTRAVENOUS AS NEEDED
Status: DISCONTINUED | OUTPATIENT
Start: 2022-01-01 | End: 2022-01-01 | Stop reason: HOSPADM

## 2022-01-01 RX ORDER — OXYCODONE HCL 5 MG/5 ML
7.5 SOLUTION, ORAL ORAL EVERY 4 HOURS PRN
Qty: 473 ML | Refills: 0 | Status: SHIPPED | OUTPATIENT
Start: 2022-01-01

## 2022-01-01 RX ORDER — SODIUM CHLORIDE 9 MG/ML
250 INJECTION, SOLUTION INTRAVENOUS ONCE
Status: CANCELLED | OUTPATIENT
Start: 2022-01-01

## 2022-01-01 RX ORDER — SODIUM CHLORIDE 9 MG/ML
250 INJECTION, SOLUTION INTRAVENOUS ONCE
Status: COMPLETED | OUTPATIENT
Start: 2022-01-01 | End: 2022-01-01

## 2022-01-01 RX ORDER — PALONOSETRON 0.05 MG/ML
0.25 INJECTION, SOLUTION INTRAVENOUS ONCE
Status: COMPLETED | OUTPATIENT
Start: 2022-01-01 | End: 2022-01-01

## 2022-01-01 RX ORDER — FAMOTIDINE 10 MG/ML
20 INJECTION, SOLUTION INTRAVENOUS AS NEEDED
Status: CANCELLED | OUTPATIENT
Start: 2022-01-01

## 2022-01-01 RX ORDER — POLYETHYLENE GLYCOL 3350 17 G/17G
17 POWDER, FOR SOLUTION ORAL DAILY PRN
COMMUNITY
End: 2022-01-01 | Stop reason: HOSPADM

## 2022-01-01 RX ORDER — LIDOCAINE HYDROCHLORIDE 10 MG/ML
INJECTION, SOLUTION INFILTRATION; PERINEURAL AS NEEDED
Status: DISCONTINUED | OUTPATIENT
Start: 2022-01-01 | End: 2022-01-01 | Stop reason: HOSPADM

## 2022-01-01 RX ORDER — DIPHENHYDRAMINE HYDROCHLORIDE 50 MG/ML
25 INJECTION INTRAMUSCULAR; INTRAVENOUS ONCE
Status: COMPLETED | OUTPATIENT
Start: 2022-01-01 | End: 2022-01-01

## 2022-01-01 RX ORDER — NALOXONE HCL 0.4 MG/ML
0.4 VIAL (ML) INJECTION AS NEEDED
Status: DISCONTINUED | OUTPATIENT
Start: 2022-01-01 | End: 2022-01-01 | Stop reason: HOSPADM

## 2022-01-01 RX ORDER — MULTIVIT WITH IRON,MINERALS
5 LIQUID (ML) ORAL DAILY
Qty: 150 ML | Refills: 2 | Status: SHIPPED | OUTPATIENT
Start: 2022-01-01 | End: 2022-01-01

## 2022-01-01 RX ORDER — FUROSEMIDE 10 MG/ML
20 INJECTION INTRAMUSCULAR; INTRAVENOUS ONCE
Status: COMPLETED | OUTPATIENT
Start: 2022-01-01 | End: 2022-01-01

## 2022-01-01 RX ORDER — PROCHLORPERAZINE MALEATE 10 MG
10 TABLET ORAL EVERY 4 HOURS PRN
Qty: 90 TABLET | Refills: 2 | Status: SHIPPED | OUTPATIENT
Start: 2022-01-01 | End: 2022-01-01

## 2022-01-01 RX ORDER — ONDANSETRON 2 MG/ML
INJECTION INTRAMUSCULAR; INTRAVENOUS AS NEEDED
Status: DISCONTINUED | OUTPATIENT
Start: 2022-01-01 | End: 2022-01-01 | Stop reason: SURG

## 2022-01-01 RX ORDER — FLUMAZENIL 0.1 MG/ML
0.2 INJECTION INTRAVENOUS AS NEEDED
Status: DISCONTINUED | OUTPATIENT
Start: 2022-01-01 | End: 2022-01-01 | Stop reason: HOSPADM

## 2022-01-01 RX ORDER — LIDOCAINE HYDROCHLORIDE 20 MG/ML
5 SOLUTION OROPHARYNGEAL 4 TIMES DAILY PRN
Qty: 100 ML | Refills: 2 | Status: SHIPPED | OUTPATIENT
Start: 2022-01-01 | End: 2022-01-01

## 2022-01-01 RX ORDER — LIDOCAINE HYDROCHLORIDE 10 MG/ML
0.5 INJECTION, SOLUTION EPIDURAL; INFILTRATION; INTRACAUDAL; PERINEURAL ONCE AS NEEDED
Status: DISCONTINUED | OUTPATIENT
Start: 2022-01-01 | End: 2022-01-01 | Stop reason: HOSPADM

## 2022-01-01 RX ORDER — OLANZAPINE 10 MG/1
5 TABLET ORAL ONCE
Status: DISCONTINUED | OUTPATIENT
Start: 2022-01-01 | End: 2022-01-01 | Stop reason: HOSPADM

## 2022-01-01 RX ORDER — OXYCODONE AND ACETAMINOPHEN 7.5; 325 MG/1; MG/1
2 TABLET ORAL EVERY 4 HOURS PRN
Status: DISCONTINUED | OUTPATIENT
Start: 2022-01-01 | End: 2022-01-01 | Stop reason: HOSPADM

## 2022-01-01 RX ORDER — SODIUM CHLORIDE 0.9 % (FLUSH) 0.9 %
10 SYRINGE (ML) INJECTION EVERY 12 HOURS SCHEDULED
Status: DISCONTINUED | OUTPATIENT
Start: 2022-01-01 | End: 2022-01-01 | Stop reason: HOSPADM

## 2022-01-01 RX ORDER — OLANZAPINE 5 MG/1
TABLET ORAL
Qty: 30 TABLET | Refills: 1 | Status: SHIPPED | OUTPATIENT
Start: 2022-01-01 | End: 2022-01-01

## 2022-01-01 RX ORDER — ACETAMINOPHEN 160 MG/5ML
650 SUSPENSION ORAL EVERY 8 HOURS PRN
Qty: 355 ML | Refills: 3 | Status: SHIPPED | OUTPATIENT
Start: 2022-01-01

## 2022-01-01 RX ORDER — OXYCODONE HCL 5 MG/5 ML
7.5 SOLUTION, ORAL ORAL EVERY 4 HOURS PRN
Qty: 150 ML | Refills: 0 | Status: SHIPPED | OUTPATIENT
Start: 2022-01-01 | End: 2022-01-01 | Stop reason: HOSPADM

## 2022-01-01 RX ORDER — OLANZAPINE 5 MG/1
5 TABLET ORAL ONCE
Status: CANCELLED | OUTPATIENT
Start: 2022-01-01 | End: 2022-01-01

## 2022-01-01 RX ORDER — MULTIPLE VITAMINS W/ MINERALS TAB 9MG-400MCG
1 TAB ORAL DAILY
COMMUNITY
End: 2022-01-01 | Stop reason: HOSPADM

## 2022-01-01 RX ORDER — ENOXAPARIN SODIUM 100 MG/ML
40 INJECTION SUBCUTANEOUS EVERY 24 HOURS
Status: DISCONTINUED | OUTPATIENT
Start: 2022-01-01 | End: 2022-01-01 | Stop reason: HOSPADM

## 2022-01-01 RX ORDER — DROPERIDOL 2.5 MG/ML
0.62 INJECTION, SOLUTION INTRAMUSCULAR; INTRAVENOUS ONCE AS NEEDED
Status: DISCONTINUED | OUTPATIENT
Start: 2022-01-01 | End: 2022-01-01 | Stop reason: HOSPADM

## 2022-01-01 RX ORDER — FAMOTIDINE 10 MG/ML
20 INJECTION, SOLUTION INTRAVENOUS AS NEEDED
Status: DISCONTINUED | OUTPATIENT
Start: 2022-01-01 | End: 2022-01-01 | Stop reason: HOSPADM

## 2022-01-01 RX ORDER — SODIUM CHLORIDE 0.9 % (FLUSH) 0.9 %
10 SYRINGE (ML) INJECTION AS NEEDED
Status: DISCONTINUED | OUTPATIENT
Start: 2022-01-01 | End: 2022-01-01 | Stop reason: HOSPADM

## 2022-01-01 RX ORDER — ONDANSETRON 2 MG/ML
4 INJECTION INTRAMUSCULAR; INTRAVENOUS EVERY 6 HOURS PRN
Status: DISCONTINUED | OUTPATIENT
Start: 2022-01-01 | End: 2022-01-01 | Stop reason: HOSPADM

## 2022-01-01 RX ORDER — OXYCODONE HCL 5 MG/5 ML
5 SOLUTION, ORAL ORAL EVERY 4 HOURS PRN
Status: DISCONTINUED | OUTPATIENT
Start: 2022-01-01 | End: 2022-01-01 | Stop reason: HOSPADM

## 2022-01-01 RX ORDER — OLANZAPINE 10 MG/1
5 TABLET ORAL ONCE
Status: DISCONTINUED | OUTPATIENT
Start: 2022-01-01 | End: 2022-01-01 | Stop reason: SDDI

## 2022-01-01 RX ORDER — HYDROCODONE BITARTRATE AND ACETAMINOPHEN 5; 325 MG/1; MG/1
1 TABLET ORAL EVERY 6 HOURS PRN
Qty: 40 TABLET | Refills: 0 | Status: SHIPPED | OUTPATIENT
Start: 2022-01-01 | End: 2022-01-01

## 2022-01-01 RX ORDER — PROPOFOL 10 MG/ML
INJECTION, EMULSION INTRAVENOUS AS NEEDED
Status: DISCONTINUED | OUTPATIENT
Start: 2022-01-01 | End: 2022-01-01 | Stop reason: SURG

## 2022-01-01 RX ORDER — ONDANSETRON 2 MG/ML
4 INJECTION INTRAMUSCULAR; INTRAVENOUS ONCE AS NEEDED
Status: DISCONTINUED | OUTPATIENT
Start: 2022-01-01 | End: 2022-01-01 | Stop reason: HOSPADM

## 2022-01-01 RX ORDER — SODIUM CHLORIDE, SODIUM LACTATE, POTASSIUM CHLORIDE, CALCIUM CHLORIDE 600; 310; 30; 20 MG/100ML; MG/100ML; MG/100ML; MG/100ML
9 INJECTION, SOLUTION INTRAVENOUS CONTINUOUS
Status: DISCONTINUED | OUTPATIENT
Start: 2022-01-01 | End: 2022-01-01

## 2022-01-01 RX ORDER — LABETALOL HYDROCHLORIDE 5 MG/ML
5 INJECTION, SOLUTION INTRAVENOUS
Status: DISCONTINUED | OUTPATIENT
Start: 2022-01-01 | End: 2022-01-01 | Stop reason: HOSPADM

## 2022-01-01 RX ORDER — ACETAMINOPHEN 325 MG/1
650 TABLET ORAL ONCE
Status: CANCELLED | OUTPATIENT
Start: 2022-01-01

## 2022-01-01 RX ORDER — SODIUM CHLORIDE 0.9 % (FLUSH) 0.9 %
3 SYRINGE (ML) INJECTION AS NEEDED
Status: DISCONTINUED | OUTPATIENT
Start: 2022-01-01 | End: 2022-01-01 | Stop reason: HOSPADM

## 2022-01-01 RX ORDER — DEXAMETHASONE SODIUM PHOSPHATE 4 MG/ML
INJECTION, SOLUTION INTRA-ARTICULAR; INTRALESIONAL; INTRAMUSCULAR; INTRAVENOUS; SOFT TISSUE AS NEEDED
Status: DISCONTINUED | OUTPATIENT
Start: 2022-01-01 | End: 2022-01-01 | Stop reason: SURG

## 2022-01-01 RX ORDER — ACETAMINOPHEN 160 MG/5ML
650 SOLUTION ORAL EVERY 8 HOURS SCHEDULED
Status: DISCONTINUED | OUTPATIENT
Start: 2022-01-01 | End: 2022-01-01 | Stop reason: HOSPADM

## 2022-01-01 RX ORDER — ONDANSETRON 4 MG/1
4 TABLET, ORALLY DISINTEGRATING ORAL EVERY 8 HOURS PRN
Qty: 20 TABLET | Refills: 0 | Status: SHIPPED | OUTPATIENT
Start: 2022-01-01

## 2022-01-01 RX ORDER — LIDOCAINE HYDROCHLORIDE 20 MG/ML
INJECTION, SOLUTION EPIDURAL; INFILTRATION; INTRACAUDAL; PERINEURAL AS NEEDED
Status: DISCONTINUED | OUTPATIENT
Start: 2022-01-01 | End: 2022-01-01 | Stop reason: SURG

## 2022-01-01 RX ORDER — OXYCODONE AND ACETAMINOPHEN 10; 325 MG/1; MG/1
1 TABLET ORAL ONCE AS NEEDED
Status: DISCONTINUED | OUTPATIENT
Start: 2022-01-01 | End: 2022-01-01 | Stop reason: HOSPADM

## 2022-01-01 RX ORDER — MORPHINE SULFATE 2 MG/ML
1 INJECTION, SOLUTION INTRAMUSCULAR; INTRAVENOUS EVERY 4 HOURS PRN
Status: DISCONTINUED | OUTPATIENT
Start: 2022-01-01 | End: 2022-01-01 | Stop reason: HOSPADM

## 2022-01-01 RX ORDER — ACETAMINOPHEN 325 MG/1
650 TABLET ORAL ONCE
Status: DISCONTINUED | OUTPATIENT
Start: 2022-01-01 | End: 2022-01-01 | Stop reason: HOSPADM

## 2022-01-01 RX ORDER — SODIUM CHLORIDE, SODIUM LACTATE, POTASSIUM CHLORIDE, CALCIUM CHLORIDE 600; 310; 30; 20 MG/100ML; MG/100ML; MG/100ML; MG/100ML
75 INJECTION, SOLUTION INTRAVENOUS CONTINUOUS
Status: DISCONTINUED | OUTPATIENT
Start: 2022-01-01 | End: 2022-01-01 | Stop reason: HOSPADM

## 2022-01-01 RX ORDER — OXYCODONE HCL 5 MG/5 ML
5 SOLUTION, ORAL ORAL EVERY 6 HOURS PRN
Qty: 120 ML | Refills: 0 | Status: SHIPPED | OUTPATIENT
Start: 2022-01-01 | End: 2022-01-01

## 2022-01-01 RX ORDER — CETIRIZINE HYDROCHLORIDE 10 MG/1
10 TABLET ORAL DAILY PRN
COMMUNITY
End: 2022-01-01 | Stop reason: HOSPADM

## 2022-01-01 RX ORDER — CALCIUM CARBONATE 200(500)MG
1 TABLET,CHEWABLE ORAL DAILY PRN
COMMUNITY
End: 2022-01-01 | Stop reason: HOSPADM

## 2022-01-01 RX ORDER — MULTIPLE VITAMINS W/ MINERALS TAB 9MG-400MCG
1 TAB ORAL DAILY
COMMUNITY

## 2022-01-01 RX ORDER — NALOXONE HCL 0.4 MG/ML
0.4 VIAL (ML) INJECTION
Status: DISCONTINUED | OUTPATIENT
Start: 2022-01-01 | End: 2022-01-01 | Stop reason: HOSPADM

## 2022-01-01 RX ORDER — SUCCINYLCHOLINE/SOD CL,ISO/PF 200MG/10ML
SYRINGE (ML) INTRAVENOUS AS NEEDED
Status: DISCONTINUED | OUTPATIENT
Start: 2022-01-01 | End: 2022-01-01 | Stop reason: SURG

## 2022-01-01 RX ORDER — SODIUM CHLORIDE, SODIUM LACTATE, POTASSIUM CHLORIDE, CALCIUM CHLORIDE 600; 310; 30; 20 MG/100ML; MG/100ML; MG/100ML; MG/100ML
1000 INJECTION, SOLUTION INTRAVENOUS CONTINUOUS
Status: DISCONTINUED | OUTPATIENT
Start: 2022-01-01 | End: 2022-01-01

## 2022-01-01 RX ADMIN — MAGNESIUM SULFATE HEPTAHYDRATE: 500 INJECTION, SOLUTION INTRAMUSCULAR; INTRAVENOUS at 12:56

## 2022-01-01 RX ADMIN — FUROSEMIDE 20 MG: 10 INJECTION, SOLUTION INTRAMUSCULAR; INTRAVENOUS at 12:54

## 2022-01-01 RX ADMIN — DEXAMETHASONE SODIUM PHOSPHATE 12 MG: 10 INJECTION, SOLUTION INTRAMUSCULAR; INTRAVENOUS at 10:48

## 2022-01-01 RX ADMIN — OXYCODONE HYDROCHLORIDE 5 MG: 5 SOLUTION ORAL at 12:13

## 2022-01-01 RX ADMIN — MAGNESIUM SULFATE HEPTAHYDRATE: 500 INJECTION, SOLUTION INTRAMUSCULAR; INTRAVENOUS at 10:50

## 2022-01-01 RX ADMIN — IOPAMIDOL 100 ML: 612 INJECTION, SOLUTION INTRAVENOUS at 11:36

## 2022-01-01 RX ADMIN — SODIUM CHLORIDE, POTASSIUM CHLORIDE, SODIUM LACTATE AND CALCIUM CHLORIDE 75 ML/HR: 600; 310; 30; 20 INJECTION, SOLUTION INTRAVENOUS at 06:36

## 2022-01-01 RX ADMIN — SODIUM CHLORIDE 250 ML: 9 INJECTION, SOLUTION INTRAVENOUS at 09:07

## 2022-01-01 RX ADMIN — PALONOSETRON HYDROCHLORIDE 0.25 MG: 0.25 INJECTION, SOLUTION INTRAVENOUS at 10:32

## 2022-01-01 RX ADMIN — FLUDEOXYGLUCOSE F18 1 DOSE: 300 INJECTION INTRAVENOUS at 10:53

## 2022-01-01 RX ADMIN — LIDOCAINE HYDROCHLORIDE 80 MG: 20 INJECTION, SOLUTION EPIDURAL; INFILTRATION; INTRACAUDAL; PERINEURAL at 10:21

## 2022-01-01 RX ADMIN — DEXAMETHASONE SODIUM PHOSPHATE 12 MG: 10 INJECTION, SOLUTION INTRAMUSCULAR; INTRAVENOUS at 09:01

## 2022-01-01 RX ADMIN — DEXAMETHASONE SODIUM PHOSPHATE 12 MG: 10 INJECTION, SOLUTION INTRAMUSCULAR; INTRAVENOUS at 09:07

## 2022-01-01 RX ADMIN — FUROSEMIDE 20 MG: 10 INJECTION, SOLUTION INTRAMUSCULAR; INTRAVENOUS at 10:50

## 2022-01-01 RX ADMIN — CISPLATIN 76 MG: 1 INJECTION INTRAVENOUS at 12:03

## 2022-01-01 RX ADMIN — CISPLATIN 76 MG: 1 INJECTION INTRAVENOUS at 11:39

## 2022-01-01 RX ADMIN — DEXAMETHASONE SODIUM PHOSPHATE 12 MG: 10 INJECTION, SOLUTION INTRAMUSCULAR; INTRAVENOUS at 11:43

## 2022-01-01 RX ADMIN — DEXAMETHASONE SODIUM PHOSPHATE 12 MG: 10 INJECTION, SOLUTION INTRAMUSCULAR; INTRAVENOUS at 09:08

## 2022-01-01 RX ADMIN — FUROSEMIDE 20 MG: 10 INJECTION, SOLUTION INTRAMUSCULAR; INTRAVENOUS at 12:48

## 2022-01-01 RX ADMIN — PROPOFOL 220 MG: 10 INJECTION, EMULSION INTRAVENOUS at 10:21

## 2022-01-01 RX ADMIN — FUROSEMIDE 20 MG: 10 INJECTION, SOLUTION INTRAMUSCULAR; INTRAVENOUS at 12:11

## 2022-01-01 RX ADMIN — FOSAPREPITANT 150 MG: 150 INJECTION, POWDER, LYOPHILIZED, FOR SOLUTION INTRAVENOUS at 10:33

## 2022-01-01 RX ADMIN — DOCUSATE SODIUM LIQUID 50 MG: 100 LIQUID ORAL at 12:26

## 2022-01-01 RX ADMIN — FUROSEMIDE 20 MG: 10 INJECTION, SOLUTION INTRAMUSCULAR; INTRAVENOUS at 11:06

## 2022-01-01 RX ADMIN — FOSAPREPITANT 150 MG: 150 INJECTION, POWDER, LYOPHILIZED, FOR SOLUTION INTRAVENOUS at 11:04

## 2022-01-01 RX ADMIN — FUROSEMIDE 20 MG: 10 INJECTION, SOLUTION INTRAMUSCULAR; INTRAVENOUS at 13:59

## 2022-01-01 RX ADMIN — CISPLATIN 76 MG: 1 INJECTION INTRAVENOUS at 11:34

## 2022-01-01 RX ADMIN — MAGNESIUM SULFATE HEPTAHYDRATE: 500 INJECTION, SOLUTION INTRAMUSCULAR; INTRAVENOUS at 09:09

## 2022-01-01 RX ADMIN — ACETAMINOPHEN 650 MG: 650 SOLUTION ORAL at 14:14

## 2022-01-01 RX ADMIN — ENOXAPARIN SODIUM 40 MG: 100 INJECTION SUBCUTANEOUS at 12:26

## 2022-01-01 RX ADMIN — Medication 10 ML: at 21:46

## 2022-01-01 RX ADMIN — MAGNESIUM SULFATE HEPTAHYDRATE: 500 INJECTION, SOLUTION INTRAMUSCULAR; INTRAVENOUS at 10:03

## 2022-01-01 RX ADMIN — ENOXAPARIN SODIUM 40 MG: 100 INJECTION SUBCUTANEOUS at 11:13

## 2022-01-01 RX ADMIN — OXYCODONE HYDROCHLORIDE 5 MG: 5 SOLUTION ORAL at 09:09

## 2022-01-01 RX ADMIN — POTASSIUM CHLORIDE: 2 INJECTION, SOLUTION, CONCENTRATE INTRAVENOUS at 11:00

## 2022-01-01 RX ADMIN — OXYCODONE HYDROCHLORIDE 5 MG: 5 SOLUTION ORAL at 14:13

## 2022-01-01 RX ADMIN — SODIUM CHLORIDE 250 ML: 9 INJECTION, SOLUTION INTRAVENOUS at 10:23

## 2022-01-01 RX ADMIN — Medication 10 ML: at 10:51

## 2022-01-01 RX ADMIN — ACETAMINOPHEN 650 MG: 650 SOLUTION ORAL at 21:46

## 2022-01-01 RX ADMIN — ONDANSETRON 4 MG: 2 INJECTION INTRAMUSCULAR; INTRAVENOUS at 10:46

## 2022-01-01 RX ADMIN — MAGNESIUM SULFATE HEPTAHYDRATE: 500 INJECTION, SOLUTION INTRAMUSCULAR; INTRAVENOUS at 12:04

## 2022-01-01 RX ADMIN — ACETAMINOPHEN 650 MG: 650 SOLUTION ORAL at 06:19

## 2022-01-01 RX ADMIN — Medication 100 MG: at 10:27

## 2022-01-01 RX ADMIN — FOSAPREPITANT 150 MG: 150 INJECTION, POWDER, LYOPHILIZED, FOR SOLUTION INTRAVENOUS at 09:29

## 2022-01-01 RX ADMIN — DOCUSATE SODIUM LIQUID 50 MG: 100 LIQUID ORAL at 21:46

## 2022-01-01 RX ADMIN — MAGNESIUM SULFATE HEPTAHYDRATE: 500 INJECTION, SOLUTION INTRAMUSCULAR; INTRAVENOUS at 11:10

## 2022-01-01 RX ADMIN — FOSAPREPITANT 150 MG: 150 INJECTION, POWDER, LYOPHILIZED, FOR SOLUTION INTRAVENOUS at 09:24

## 2022-01-01 RX ADMIN — FOSAPREPITANT 150 MG: 150 INJECTION, POWDER, LYOPHILIZED, FOR SOLUTION INTRAVENOUS at 09:18

## 2022-01-01 RX ADMIN — SODIUM CHLORIDE, POTASSIUM CHLORIDE, SODIUM LACTATE AND CALCIUM CHLORIDE 75 ML/HR: 600; 310; 30; 20 INJECTION, SOLUTION INTRAVENOUS at 12:26

## 2022-01-01 RX ADMIN — FLUDEOXYGLUCOSE F18 1 DOSE: 300 INJECTION INTRAVENOUS at 09:20

## 2022-01-01 RX ADMIN — MAGNESIUM SULFATE HEPTAHYDRATE: 500 INJECTION, SOLUTION INTRAMUSCULAR; INTRAVENOUS at 09:49

## 2022-01-01 RX ADMIN — POTASSIUM CHLORIDE: 2 INJECTION, SOLUTION, CONCENTRATE INTRAVENOUS at 09:37

## 2022-01-01 RX ADMIN — DEXAMETHASONE SODIUM PHOSPHATE 12 MG: 10 INJECTION, SOLUTION INTRAMUSCULAR; INTRAVENOUS at 10:14

## 2022-01-01 RX ADMIN — SODIUM CHLORIDE, POTASSIUM CHLORIDE, SODIUM LACTATE AND CALCIUM CHLORIDE 75 ML/HR: 600; 310; 30; 20 INJECTION, SOLUTION INTRAVENOUS at 02:21

## 2022-01-01 RX ADMIN — SODIUM CHLORIDE 250 ML: 9 INJECTION, SOLUTION INTRAVENOUS at 09:09

## 2022-01-01 RX ADMIN — PALONOSETRON 0.25 MG: 0.05 INJECTION, SOLUTION INTRAVENOUS at 10:32

## 2022-01-01 RX ADMIN — POTASSIUM CHLORIDE: 2 INJECTION, SOLUTION, CONCENTRATE INTRAVENOUS at 09:54

## 2022-01-01 RX ADMIN — IOPAMIDOL 100 ML: 612 INJECTION, SOLUTION INTRAVENOUS at 20:07

## 2022-01-01 RX ADMIN — CISPLATIN 76 MG: 1 INJECTION, SOLUTION INTRAVENOUS at 11:09

## 2022-01-01 RX ADMIN — PALONOSETRON HYDROCHLORIDE 0.25 MG: 0.25 INJECTION, SOLUTION INTRAVENOUS at 10:45

## 2022-01-01 RX ADMIN — SODIUM CHLORIDE 250 ML: 9 INJECTION, SOLUTION INTRAVENOUS at 09:00

## 2022-01-01 RX ADMIN — SODIUM CHLORIDE, POTASSIUM CHLORIDE, SODIUM LACTATE AND CALCIUM CHLORIDE 1000 ML: 600; 310; 30; 20 INJECTION, SOLUTION INTRAVENOUS at 09:18

## 2022-01-01 RX ADMIN — ACETAMINOPHEN 650 MG: 650 SOLUTION ORAL at 06:59

## 2022-01-01 RX ADMIN — Medication 2 G: at 10:28

## 2022-01-01 RX ADMIN — FUROSEMIDE 20 MG: 10 INJECTION INTRAMUSCULAR; INTRAVENOUS at 10:47

## 2022-01-01 RX ADMIN — DEXAMETHASONE SODIUM PHOSPHATE 8 MG: 4 INJECTION, SOLUTION INTRA-ARTICULAR; INTRALESIONAL; INTRAMUSCULAR; INTRAVENOUS; SOFT TISSUE at 10:42

## 2022-01-01 RX ADMIN — FUROSEMIDE 20 MG: 10 INJECTION, SOLUTION INTRAMUSCULAR; INTRAVENOUS at 10:58

## 2022-01-01 RX ADMIN — DOCUSATE SODIUM LIQUID 50 MG: 100 LIQUID ORAL at 09:09

## 2022-01-01 RX ADMIN — DOCUSATE SODIUM LIQUID 50 MG: 100 LIQUID ORAL at 21:27

## 2022-01-01 RX ADMIN — SODIUM CHLORIDE 250 ML: 9 INJECTION, SOLUTION INTRAVENOUS at 09:18

## 2022-01-01 RX ADMIN — MAGNESIUM SULFATE HEPTAHYDRATE: 500 INJECTION, SOLUTION INTRAMUSCULAR; INTRAVENOUS at 09:18

## 2022-01-01 RX ADMIN — FUROSEMIDE 20 MG: 10 INJECTION, SOLUTION INTRAMUSCULAR; INTRAVENOUS at 11:41

## 2022-01-01 RX ADMIN — SODIUM CHLORIDE 250 ML: 9 INJECTION, SOLUTION INTRAVENOUS at 09:05

## 2022-01-01 RX ADMIN — FUROSEMIDE 20 MG: 10 INJECTION, SOLUTION INTRAMUSCULAR; INTRAVENOUS at 12:23

## 2022-01-01 RX ADMIN — FUROSEMIDE 20 MG: 10 INJECTION, SOLUTION INTRAMUSCULAR; INTRAVENOUS at 11:37

## 2022-01-01 RX ADMIN — FERRIC CARBOXYMALTOSE INJECTION 750 MG: 50 INJECTION, SOLUTION INTRAVENOUS at 10:24

## 2022-01-01 RX ADMIN — BARIUM SULFATE 250 ML: 400 SUSPENSION ORAL at 08:50

## 2022-01-01 RX ADMIN — PALONOSETRON HYDROCHLORIDE 0.25 MG: 0.25 INJECTION, SOLUTION INTRAVENOUS at 09:00

## 2022-01-01 RX ADMIN — PALONOSETRON HYDROCHLORIDE 0.25 MG: 0.25 INJECTION, SOLUTION INTRAVENOUS at 09:05

## 2022-01-01 RX ADMIN — FOSAPREPITANT 150 MG: 150 INJECTION, POWDER, LYOPHILIZED, FOR SOLUTION INTRAVENOUS at 10:31

## 2022-01-01 RX ADMIN — PALONOSETRON 0.25 MG: 0.05 INJECTION, SOLUTION INTRAVENOUS at 09:25

## 2022-01-01 RX ADMIN — CISPLATIN 76 MG: 1 INJECTION INTRAVENOUS at 11:00

## 2022-01-01 RX ADMIN — CISPLATIN 76 MG: 1 INJECTION INTRAVENOUS at 10:50

## 2022-01-01 RX ADMIN — POTASSIUM CHLORIDE: 2 INJECTION, SOLUTION, CONCENTRATE INTRAVENOUS at 11:37

## 2022-01-01 RX ADMIN — SODIUM CHLORIDE 250 ML: 9 INJECTION, SOLUTION INTRAVENOUS at 09:30

## 2022-01-01 RX ADMIN — DIPHENHYDRAMINE HYDROCHLORIDE 25 MG: 50 INJECTION INTRAMUSCULAR; INTRAVENOUS at 21:45

## 2022-01-01 RX ADMIN — FUROSEMIDE 20 MG: 10 INJECTION, SOLUTION INTRAMUSCULAR; INTRAVENOUS at 13:23

## 2022-01-01 RX ADMIN — ACETAMINOPHEN 650 MG: 650 SOLUTION ORAL at 21:27

## 2022-01-01 RX ADMIN — SODIUM CHLORIDE, POTASSIUM CHLORIDE, SODIUM LACTATE AND CALCIUM CHLORIDE 500 ML: 600; 310; 30; 20 INJECTION, SOLUTION INTRAVENOUS at 20:46

## 2022-01-06 ENCOUNTER — OFFICE VISIT (OUTPATIENT)
Dept: OTOLARYNGOLOGY | Facility: CLINIC | Age: 72
End: 2022-01-06

## 2022-01-06 VITALS — DIASTOLIC BLOOD PRESSURE: 74 MMHG | HEART RATE: 97 BPM | TEMPERATURE: 97.6 F | SYSTOLIC BLOOD PRESSURE: 129 MMHG

## 2022-01-06 DIAGNOSIS — R22.1 MASS OF LEFT SIDE OF NECK: ICD-10-CM

## 2022-01-06 DIAGNOSIS — J34.2 ACQUIRED DEVIATED NASAL SEPTUM: ICD-10-CM

## 2022-01-06 DIAGNOSIS — C79.89 METASTATIC SQUAMOUS CELL CARCINOMA TO HEAD AND NECK: ICD-10-CM

## 2022-01-06 DIAGNOSIS — E44.0 MODERATE PROTEIN-CALORIE MALNUTRITION: ICD-10-CM

## 2022-01-06 DIAGNOSIS — R63.4 ABNORMAL WEIGHT LOSS: ICD-10-CM

## 2022-01-06 DIAGNOSIS — C32.1 SQUAMOUS CELL CANCER OF EPIGLOTTIS: Primary | ICD-10-CM

## 2022-01-06 DIAGNOSIS — Z87.891 FORMER SMOKER: ICD-10-CM

## 2022-01-06 PROCEDURE — 99024 POSTOP FOLLOW-UP VISIT: CPT | Performed by: OTOLARYNGOLOGY

## 2022-01-06 RX ORDER — FLUTICASONE PROPIONATE 50 MCG
2 SPRAY, SUSPENSION (ML) NASAL DAILY PRN
COMMUNITY

## 2022-01-06 NOTE — PROGRESS NOTES
Robert Ayers Jr, MD  [facility]  [address], [address]  [place] [address]  Fax [phone]  Phone [phone]      Visit Type: POST-OP   Chief Complaint   Patient presents with   • Mass     Follow up on left side mass         HPI   Accompanied by no one  Enrique Coronado is a 71 y.o.  male who presents for follow up s/p Direct Laryngoscopy, Esophagoscopy, Bronchoscopy - Bilateral and Microdirect Laryngoscopy With/without Laser - Bilateral on 12/27/2021. The patient has had a relatively normal postoperative course. The patient has had improvement in swallowing since surgery.   Atrovent helping nasal drainage.    Cancer Surveillance:  Cancer site: Epiglottis/supraglottis  Initial staging: [id number]  Re-staging: None  Therapy: None yet  Completion therapy: None      History     Last Reviewed by Robert Ayers Jr., MD on 1/6/2022 at 10:04 AM    Sections Reviewed    Medical, Family, Tobacco, Surgical      Problem list reviewed by Robert Ayers Jr., MD on 1/6/2022 at 10:04 AM  Medicines reviewed by Robert Ayers Jr., MD on 1/6/2022 at 10:04 AM  Allergies reviewed by Robert Ayers Jr., MD on 1/6/2022 at 10:04 AM        Vital Signs:   Temp:  [97.6 °F (36.4 °C)] 97.6 °F (36.4 °C)  Heart Rate:  [97] 97  BP: (129)/(74) 129/74  ENT Physical Exam  Constitutional  Appearance: patient appears well-developed, well-nourished and well-groomed,  Communication/Voice: communication appropriate for developmental age; vocal quality normal;  Head and Face  Appearance: head appears normal, face appears normal and face appears atraumatic;  Palpation: facial palpation normal;  Salivary: glands normal;  Ear  Hearing: impaired to conversational voice;  Auricles: bilateral auricles normal;  External Mastoids: bilateral external mastoids normal;  Ear Canals: bilateral ear canals normal;  Tympanic Membranes: bilateral tympanic  membranes normal;  Nose  External Nose: nares patent bilaterally; external nose normal;  Oral Cavity/Oropharynx  Lips: normal;  Teeth: missing teeth (Edentulous upper and lower) and dentures (upper) noted;  Gums: gingiva normal;  Tongue: normal;  Oral mucosa: normal;  Hard palate: normal;  Soft palate: normal;  Tonsils: right tonsil fossa well-healed, bilateral tonsils absent,  Base of Tongue: normal; with no lesion present; Base of Tongue comments: palpation  Posterior pharyngeal wall: no lesion or mass noted;  Neck  Neck: neck normal;  Respiratory  Inspection: breathing unlabored; normal breathing rate;  Cardiovascular  Inspection: extremities are warm and well perfused; no peripheral edema present;  Lymphatic  Palpation: single left cervical lymph node present;  Lymphatic comments: Left Level 3 3 cm firm, immobile mass  Neurovestibular  Mental Status: alert and oriented;  Psychiatric: mood normal; affect is appropriate;  Cranial Nerves: cranial nerves intact;  Drawings           Result Review    RESULTS REVIEW    I have reviewed the patients old records in the chart.     I have reviewed the patients old records in the chart.  The following results/records were reviewed:  Lab Results   Component Value Date    FINALDX  12/27/2021     1.  Epiglottis, biopsy:  A.  Keratinizing squamous cell carcinoma, invasive.  B.  Maximum depth of invasion is 1.4 mm.  C.  No perineural invasion identified.  D.  p16 negative.    2.  Base of tongue, biopsy:  A.  Low-grade dysplasia of squamous mucosa.  B.  Reactive lymphoid hyperplasia in the subepithelial stroma.  C.  No histologic evidence of malignancy.    D.  Left false vocal cord, biopsy:  A.  Keratinizing squamous cell carcinoma, invasive.  B.  Tumor invades to the depth of the biopsy (depth of invasion at least 1.91 mm).  C.  No perineural invasion identified.  D.  p16 negative.    4.  Right false vocal cord, biopsy:  A.  Keratinizing squamous cell carcinoma, invasive.  B.   Tumor invades to the depth of the biopsy (depth of invasion at least 2.44 mm).  C.  No perineural invasion identified.  E.  p16 negative.    AJCC stage: pTX pNX          Assessment/Plan    Diagnoses and all orders for this visit:    1. 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 Mass of left side of neck  Comments:  Metastatic disease to the left level 2B    3. Acquired deviated nasal septum    4. Abnormal weight loss    5. Former smoker    6. 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 Moderate protein-calorie malnutrition (HCC)  Comments:  Poor p.o. intake       Medical and surgical options were discussed including observation, medication modification, surgical management and referral to Rad/Med Onc. Risks, benefits and alternatives were discussed and questions were answered. After considering the options, the patient decided to proceed with referral to Rad/ Med Onc.     Patient has definitive diagnosis of cancer.  He has stage IV cancer of the epiglottis because it extends down into the true glottic area.  He also has left neck adenopathy.  I will get a PET scan and refer to radiation oncology, medical oncology.  I have discussed therapeutic options with the patient.  After he has seen consultants, he will decide on definitive therapy.  Surgical consideration would have to include laryngectomy given the extent of his tumor into the larynx.  All care for the cancer patient  Increase p.o. intake for nutrition  Referral to radiation oncology and medical oncology       Encouraged to enroll in My  Chart  Encouraged to review data and findings in My Chart    Patient understand(s) and agree(s) with the treatment plan as described.      Return in about 6 weeks (around 2/17/2022) for Recheck Larynx, possible flex scope.      Robert Ayers Jr, MD  01/06/22  10:11 CST

## 2022-01-06 NOTE — PATIENT INSTRUCTIONS
NASAL SALINE:  Use 2 puffs each nostril 4-6 times daily and more frequently if possible.  You can buy saline spray or you can make your own and use an old spray bottle to administer  Use a humidifier at bedside  Recipe for saline:  Water                                 1 quart  Salt (table)                        1 tablespoon  Gylcerin (or Mrecy Syrup)    1 teaspoon  Sodium bicarbonate           1 teaspoon  Sprays or [place] pots are recommended    Do not allow to stand for more than 24 hrs. Make new solution. There is no preservative in this solution.    ORAL COMPLICATIONS OF CANCER TREATMENT    Cancer treatments can have a toxic effect on the mucous membranes of the mouth and throat tissues. Almost all patients who receive radiation therapy for head and neck malignancies will develop problems with their gums, mouth, tongue, or teeth. Chemotherapy and bone marrow transplants can also affect these areas.    Many of the problems can be prevented or minimized by careful and diligent attention. Patients should seek dental care prior to beginning the cancer treatments. Taking care of existing problems before therapy can prevent major problems later, including tooth and bone infection, tooth and bone loss, and pain.    Dr. Ayers works closely with the medical and radiation oncologists, as well as the dentist and oral care providers to ensure the optimal heal of the head and neck tissues during cancer treatments. However, the patient needs to maintain a routine to care for the teeth and mucus membranes before and especially during and after treatment.    General Oral Complications of Cancer Treatment  Inflammation and ulceration of the mucous membranes, called stomatitis or mucositis  Infection  Salivary gland dysfunction, called xerostomia  Tooth decay and demineralization  Impaired function; difficulty eating, swallowing, speaking  Chronic throat pain/ scratchiness  Altered taste perception  Poor  nutrition    Additional complications of Chemotherapy  Nerve pain  Bleeding  Skin damage  Prolonged/delayed healing    Additional complications of Radiation therapy  Rampant dental decay  Skin damage  Jaw or neck stiffness  Increased susceptibility to injury and infection  Prolonged/delayed healing    HOW TO CARE FOR YOUR ORAL HEALTH DURING CANCER THERAPY  See a dentist prior to beginning radiation therapy  Brush your teeth and tongue gently with an extra soft, even bristled toothbrush and a bland toothpaste after every meal and at bedtime  Floss teeth once a day but avoid areas that are bleeding or sore  Don’t use mouthwash that contains alcohol  Rinse mouth with baking soda/salt combination or an antiplaque solution at room temperature several times daily. You may also use plain water.  Use a waterpik aimed at the teeth, not the gums with a warm baking soda and salt mixture.  If you wear dentures, wear them only when necessary and never at night.  Increase water intake and use oral moisturizing gels.  Use fluoride if recommended by the dentist.  Exercise jaw muscles 3 times daily and more. Do 20 repetitions of opening and closing the mouth, gently stretching and holding open. If the jaw muscles are particularly stiff, you may insert your fingers and gently pull the jaws apart.  Avoid rough-textured or irritating foods. Drink sips of liquids with each bite to allow the food to soften or thin the food.  Use Chapstick, Blistex, or other soothing lip balms, including Vaseline or Polysporin to the lips.  If the lips begin to crust, do not pull this off is there is resistance. Instead, soak the lips with a warm wash cloth to gently loosen these crusts.  Talk with Dr. Ayers about pain or numbing medications, both topical and systemic.  Quit smoking, chewing, snuff. All of these tobacco products accelerate the decay process and increase cancer risks.    For a dry mouth:  Sip water frequently  Use a humidifier  Suck ice  chips or sugar free candy  Chew sugar free gum  If desired, use saliva substitute or gel, or prescription saliva stimulant  Avoid lemon glycerin swabs and sugar candies and lozenges    Recipe for oral rinse:  Salt  1 tablespoon  Baking Soda 1 teaspoon  Water  1 quart  Mercy syrup 1 tablespoon    Optional:  Small amount of mouthwash for flavoring  If you have scabs or extremely thick mucous, you may mix the above 50:50 with hydrogen peroxide to help loosen this thick layer.    Dietary Instructions:  Choose soft, moist foods  Moisten foods with gravy, broth or butter  Increase fluids with meals  Keep food bite-sized  Blenderize foods or use baby foods  Avoid:  Very hot or very cold beverages  Scratchy or rough foods like pretzels, chips, crackers or nuts  Spicy or salty foods like canned soups. vinegar, ketchup, salsa  Alcohol, beer, wine, whiskey, etc.  Strongly minted candies and toothpaste  Fumes like ammonia, household , paints, gasoline, solvents    Skin care with Cancer treatment:  Moisturize the area of treatment at least 4 times daily and more often  Use hypoallergenic moisturizers  Ask Dr. Ayers about certain healing creams and lotions  Avoid strong soaps and excessive makeup  Avoid the sun  If sun exposure is anticipated, use high SPF sunblocks and Zinc Oxide  Do not use razor blades  Avoid any trauma to the area, including squeezing pimples and popping blisters  Report any sores or skin breakdown to Dr. Ayers    Medications:  Continue all your daily medications  If you are experiencing side effects, report these to Dr. Ayers  Continue your multivitamins  Certain medications may interfere with radiation. Please check with Dr. Ayers  Certain natural and homeopathic products can interfere and lessen the efficacy of radiation, especially anti-oxidants. Please ask Dr. Ayers about these products.    Please do not hesitate to call the office for questions or problems.    PET scan  ordered    Increase oral intake.  Try Ensure or Boost.    CONTACT INFORMATION:  The main office phone number is [phone]. For emergencies after hours and on weekends, this number will convert over to our answering service and the on call provider will answer. Please try to keep non emergent phone calls/ questions to office hours 9am-5pm Monday through Friday.     iWantoo  As an alternative, you can sign up and use the Epic MyChart system for more direct and quicker access for non emergent questions/ problems.  [facility] iWantoo allows you to send messages to your doctor, view your test results, renew your prescriptions, schedule appointments, and more. To sign up, go to [email or web address] and click on the Sign Up Now link in the New User? box. Enter your iWantoo Activation Code exactly as it appears below along with the last four digits of your Social Security Number and your Date of Birth () to complete the sign-up process. If you do not sign up before the expiration date, you must request a new code.    iWantoo Activation Code: [email or web address]  Expires: 2022  7:15 AM    If you have questions, you can email [email or web address] or call [phone] to talk to our iWantoo staff. Remember, iWantoo is NOT to be used for urgent needs. For medical emergencies, dial 911.

## 2022-01-10 NOTE — PROGRESS NOTES
[facility] HEMATOLOGY & ONCOLOGY  [address]  [facility] [address] [phone]    Patient Name: Enrique Coronado  Encounter Date: 01/14/2022  YOB: 1950  Patient Number: [id number]    Initial Note    REASON FOR CONSULTATION: Enrique Coronado is a pleasant 71 y.o.  male, previous smoker, referred by Robert Ayers Jr* for management recommendations for squamous cell carcinoma, epiglottis.  He is seen alone. History is obtained from patient. History is considered to be accurate.    HISTORY OF PRESENT ILLNESS: He presented with left-sided neck mass 04/2021.    Patient had progressive symptoms with sinus drainage and unable to eat.  He has lost 60 pounds since 05/2021.    He was seen by Dr. Robert Ayers on 12/17/2021.  Examination showed a 3 cm firm immobile node, level 3, left neck.  Laryngoscopy showed pooling, erythema, questionable lesion in the inferior vallecula adjacent to the epiglottis.  Lingual tonsil hypertrophy.  Granulated lesion involving the lingual and laryngeal surface thickening of the epiglottis standing down the epiglottis into the petiole area and on to the anterior false cords.  Reduced mobility through vocal cords.  Biopsy x2 left neck mass.    Pathology report 12/20/2021 left neck fine-needle aspiration: Positive for malignancy metastatic squamous cell carcinoma.    CBC 12/20/2021 revealed a WBC of 8.1, hemoglobin 15.4, and platelets 194.  BMP remarkable for CO2 of 30.    He had undergone direct laryngoscopy, esophagoscopy, bronchoscopy, and MicroDirect laryngoscopy 12/27/2021.  Base of tongue mass noted in the vallecula that is quite firm, extending onto the lingual surface of the epiglottis, right tonsillar hypertrophy.  Supraglottic/epiglottic entirely consumed with firm tumor, both lingual and laryngeal surfaces, extending to the area epiglottic folds midway; arytenoids appear intact.,   Hypertrophied.  Glottis false vocal cords with anterior involvement of the tumor extending down to the petiole and out onto the false vocal cords; true vocal cords appear unaffected by this, with normal mucosal surface of the subglottic neck, no tumor or scarring.    Follow-up with Dr. Robert Ayers on 01/06/2022.  Patient diagnosed with epiglottic cancer.  The tumor extended down to the glottic proper on the false vocal folds.  AJCC stage(cT4b, cN2b, cM0).  PET was ordered.  Patient referred to medical and radiation oncology.  Follow-up 02/17/2022.    PET scan 01/12/2022.   Hypermetabolic glottic and supraglottic mass is consistent with primary laryngeal malignancy. Hypermetabolic bilateral level 2 and 3 lymph nodes (5 total, 3 on the left and 2 on the right) as described above are consistent with  metastatic disease.    With above background, he is seen.      LABS    Lab Results - Last 18 Months   Lab Units 12/23/21  0906   HEMOGLOBIN g/dL 15.4   HEMATOCRIT % 47.8   MCV fL 90.5   WBC 10*3/mm3 8.11   RDW % 15.5*   MPV fL 10.1   PLATELETS 10*3/mm3 194       Lab Results - Last 18 Months   Lab Units 12/27/21  0853   GLUCOSE mg/dL 107*   SODIUM mmol/L 140   POTASSIUM mmol/L 4.5   CO2 mmol/L 30.0*   CHLORIDE mmol/L 104   ANION GAP mmol/L 6.0   CREATININE mg/dL 0.71*   BUN mg/dL 13   BUN / CREAT RATIO  18.3   CALCIUM mg/dL 8.8   EGFR IF NONAFRICN AM mL/min/1.73 110       No results for input(s): MSPIKE, KAPPALAMB, IGLFLC, URICACID, FREEKAPPAL, CEA, LDH, REFLABREPO in the last 92522 hours.    No results for input(s): IRON, TIBC, LABIRON, FERRITIN, C0SVVLN, TSH, FOLATE in the last 76518 hours.    Invalid input(s): VITB12      PAST MEDICAL HISTORY:  ALLERGIES:  No Known Allergies  CURRENT MEDICATIONS:  Outpatient Encounter Medications as of 1/14/2022   Medication Sig Dispense Refill   • ipratropium (ATROVENT) 0.06 % nasal spray 2 sprays into the nostril(s) as directed by provider 4 (Four) Times a Day As Needed for Rhinitis.  For drainage 45 mL 3   • fluticasone (FLONASE) 50 MCG/ACT nasal spray 2 sprays into the nostril(s) as directed by provider Daily.     • POTASSIUM CHLORIDE PO Take  by mouth Every Other Day.     • [] fludeoxyglucose F18 injection 1 dose        No facility-administered encounter medications on file as of 2022.     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   • Mass of left side of neck R22.1     SURGERIES:  Past Surgical History:   Procedure Laterality Date   • LARYNGOSCOPY Bilateral 2021    Procedure: MICRODIRECT LARYNGOSCOPY WITH/WITHOUT LASER;  Surgeon: Robert Ayers Jr., MD;  Location: [facility] OR;  Service: ENT;  Laterality: Bilateral;   • PANENDOSCOPY Bilateral 2021    Procedure: DIRECT LARYNGOSCOPY, ESOPHAGOSCOPY, BRONCHOSCOPY;  Surgeon: Robert Ayers Jr., MD;  Location: [facility] OR;  Service: ENT;  Laterality: Bilateral;   • TEETH EXTRACTION     • TONSILLECTOMY       HEALTH MAINTENANCE ITEMS:  Health Maintenance Due   Topic Date Due   • COLORECTAL CANCER SCREENING  Never done   • COVID-19 Vaccine (1) Never done   • TDAP/TD VACCINES (1 - Tdap) Never done   • ZOSTER VACCINE (1 of 2) Never done   • Pneumococcal Vaccine 65+ (1 of 1 - PPSV23) Never done   • INFLUENZA VACCINE  2021   • HEPATITIS C SCREENING  Never done   • ANNUAL WELLNESS VISIT  Never done       <no information>  Last Completed Colonoscopy     This patient has no relevant Health Maintenance data.          There is no immunization history on file for this patient.  Last Completed Mammogram     This patient has no relevant Health Maintenance data.            FAMILY HISTORY:  History reviewed. No pertinent family history.  SOCIAL HISTORY:  Social History     Socioeconomic History   • Marital status: Single   Tobacco Use   • Smoking status: Former Smoker     Types: Cigars     Quit date: 2021     Years since quittin.8   • Smokeless  "tobacco: Never Used   Vaping Use   • Vaping Use: Never used   Substance and Sexual Activity   • Alcohol use: Yes     Comment: OCC   • Drug use: Never   • Sexual activity: Defer       REVIEW OF SYSTEMS:  Review of Systems   Constitutional: Negative for chills, fatigue and fever.        \"I feel a tingling when I swallow. Nothing serious.\"   HENT: Negative for congestion, hearing loss and trouble swallowing.    Eyes: Negative for redness and visual disturbance.   Respiratory: Negative for cough, shortness of breath and wheezing.    Cardiovascular: Negative for chest pain and leg swelling.   Gastrointestinal: Negative for abdominal pain, nausea and vomiting.   Endocrine: Negative for cold intolerance and heat intolerance.   Genitourinary: Negative for difficulty urinating, dysuria and flank pain.   Musculoskeletal: Negative for gait problem and myalgias.   Skin: Negative for pallor.   Allergic/Immunologic: Negative for food allergies.   Neurological: Negative for speech difficulty, weakness and confusion.   Hematological: Positive for adenopathy. Does not bruise/bleed easily.   Psychiatric/Behavioral: Negative for agitation, hallucinations and depressed mood.       /74   Pulse 82   Temp 98.3 °F (36.8 °C)   Resp 18   Ht 175 cm (68.9\")   Wt 72.5 kg (159 lb 12.8 oz)   SpO2 96%   BMI 23.67 kg/m²  Body surface area is 1.88 meters squared.  Pain Score    01/14/22 1000   PainSc: 0-No pain       Physical Exam:  Physical Exam  Vitals reviewed.   Constitutional:       General: He is not in acute distress.  HENT:      Head: Normocephalic and atraumatic.      Comments: 3 cm left cervical node, firm and fixed.   Eyes:      General: No scleral icterus.  Cardiovascular:      Rate and Rhythm: Normal rate.   Pulmonary:      Effort: No respiratory distress.      Breath sounds: No wheezing or rales.   Abdominal:      General: Bowel sounds are normal.      Palpations: Abdomen is soft.      Tenderness: There is no abdominal " tenderness.   Musculoskeletal:         General: No swelling.      Cervical back: Neck supple.   Skin:     General: Skin is warm and dry.      Coloration: Skin is not pale.   Neurological:      Mental Status: He is alert and oriented to person, place, and time.   Psychiatric:         Mood and Affect: Mood normal.         Behavior: Behavior normal.         Thought Content: Thought content normal.         Judgment: Judgment normal.         Enrique Coronado reports a pain score of 0.      ASSESSMENT:  1.  Squamous cell carcinoma of the epiglottis.  AJCC stage:IVB(cT4b, cN2c, cM0)  Treatment status: Pending.  2.  Performance status of 0.  3.  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  4.  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      PLAN:   1.   regarding the role for concurrent weekly cisplatin with radiation to minimize toxicity.  2.   regarding potential adverse effects of chemotherapy especially infusion reactions, nausea, vomiting, cytopenias, neuropathy, ototoxicity, renal toxicity, alopecia and fatigue.  3.  Blood for CBC with differential, CMP and magnesium.  4.  Refer to Dr. Bird for concurrent chemo radiation.  5.  Schedule weekly chemo and radiation:  Cisplatin 40 mg/m² D1.  6.  Pre-hydrate 1 L NS with 10 mEq KCl and 1 g magnesium.  Lasix 20 mg IV push after prehydration.  7.  Post hydrate with 1 L NS with 10 mEq KCl and 1 g magnesium.  Lasix 20 mg IV push after post hydration.  8.  Premed:  Aloxi 0.25 mg.  Emend 150 mg IV  Decadron 12 mg IV.  9.  Weekly CBC with differential, CMP and magnesium once he starts chemo.  10.  eRx Zofran 8 mg p.o. every 8 hours as needed for nausea and vomiting #60 with 2 refills.  11.  eRx Compazine 10 mg p.o. every 4 hours as needed for nausea and vomiting #60 with 2 refills.  12.  eRx Zyprexa 5 mg po daily for 4 days, start first day of chemo, # 4, 3 refills.   13.  Continue care per primary care physician and other specialists.  14.  Plan of  care discussed with patient.  Understand expressed.  Patient agreeable to proceed.  15.  Chemo education.  16.  Advance Care Planning   ACP discussion was held with the patient during this visit. Patient has an advance directive (not in EMR), copy requested.  17.  Return to office in 4 weeks.  18.  See Shawna for possible research study.       Thank you for the referral.      I have reviewed the assessment and plan and verified the accuracy of it. No changes to assessment and plan since the information was documented. Brad Rubin MD 01/14/22       I spent 64 total minutes, face-to-face, caring for Enrique today.  Greater than 50% of this time involved counseling and/or coordination of care as documented within this note regarding the patient's illness(es), pros and cons of various treatment options, instructions and/or risk reduction.        MD Robert Carrasquillo MD Debra Wilder, MD

## 2022-01-14 NOTE — RESEARCH
Informed consent worksheet for the protocol: Ascend 2    Consent version dated 10- with revised date of 12- with the approval dated 11-    Subject ID [id number]      The patient was seen in the office today by Dr. Brad Rubin and identified as a candidate for the Ascend 2 trial.     The following people were present at the consent conference:   Patient: Enrique Coronado   : Sylvester Aceves   Other: Shawna Foster      The following was discussed with the patient prior to signing the informed consent.    • The study purposes   • Procedures   • Duration of study participation  • New findings   • Risks   • Discomforts  • Any known side effects when applicable    • The alternative treatments   • Benefits   • Patient and insurance costs   • Payments if applicable     • Patient's accountability and responsibilities    • The voluntary nature of research participants     • Right to withdraw consent    • The withdrawal process    • Confidentiality   • Authorization to use and disclose information for research purposes - Including who can view information and the types of information shared.    The patient was informed of what to do in case of an illness or injury resulting from the study and who to contact for more trial information, or information on rights and welfare as a research volunteer. The patient was given the  contact Information and clinical trial contact information.     The patient was given opportunity for questions and opportunity to discuss this with family and friends. The  and or sub investigators were also available for any questions. The patient verbalizes understanding and is willing to sign the informed consent.     After all questions were satisfied the patient signed the informed consent and a copy of the signed main informed consent form & any sub-study informed consent forms were given to the patient.    No  study-specific procedures were completed prior to patient signing study informed consent form(s)    The  and or sub investigator is aware of the subject's participation status.

## 2022-01-16 PROBLEM — Z87.891 FORMER SMOKER: Status: ACTIVE | Noted: 2022-01-01

## 2022-01-16 NOTE — PROGRESS NOTES
[facility] MD      Kieran Franks, APRN  ____________________________________________________________  [facility]  Department of Radiation Oncology  [address] [place] [id number]  Office:  [phone]  Fax: [phone]    DATE:  01/17/2022  PATIENT: Enrique Coronado  1950                         MEDICAL RECORD #:  [id number]                                                       REASON FOR VISIT:    No chief complaint on file.    Enrique Coronado is a very pleasant male that has been referred to our office for radiotherapy considerations. Reports unexpected weight change, left ear pain, sinus pressure, sore throat, tinnitus, trouble swallowing, and cough, Denies appetite change, visual disturbance, SOB, nausea/vomiting, diarrhea, light-headedness, weakness, and headaches. He follows  and .     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     12/15/2021 - Appointment with :  • Enrique Coronado is a 71 y.o. male with neck mass. He was referred. He has had sinus since 4/2021. He says he had drainage. He was unable to eat.  • He has lost 60 lbs. He noted since May.  • He says his nose bled as well.  • He is unaware of neck mass.  • He has had CT at [facility].  Recommendations:  • Patient has an epiglottic lesion causing pooling and aspiration.  I fear this is cancer.  I discussed my concerns with the patient.  I feel like patient needs a biopsy and panendoscopy.    • I have discussed risk, benefits, alternative treatments, options.    • The patient appears to understand and wishes to proceed.  • Plan endoscopy   • Patient understands plan as described and wishes to proceed.  • Return RTC 2 weeks after surgery, for Recheck throat.      12/15/2021 - Lymph node, left neck, fine-needle aspiration  per :  • Positive for malignancy.  • Metastatic squamous cell carcinoma.    12/27/2021 - DIRECT LARYNGOSCOPY, ESOPHAGOSCOPY, BRONCHOSCOPY, MICRODIRECT LARYNGOSCOPY WITH/WITHOUT LASER per :  • Epiglottis, biopsy:  o Keratinizing squamous cell carcinoma, invasive.  o Maximum depth of invasion is 1.4 mm.  o No perineural invasion identified.  o p16 negative.  • Base of tongue, biopsy:  o Low-grade dysplasia of squamous mucosa.  o Reactive lymphoid hyperplasia in the subepithelial stroma.  o No histologic evidence of malignancy.  • Left false vocal cord, biopsy:  o Keratinizing squamous cell carcinoma, invasive.  o Tumor invades to the depth of the biopsy (depth of invasion at least 1.91 mm).  o No perineural invasion identified.  o p16 negative.  • Right false vocal cord, biopsy:  o Keratinizing squamous cell carcinoma, invasive.  o Tumor invades to the depth of the biopsy (depth of invasion at least 2.44 mm).  o No perineural invasion identified.  o p16 negative.  • AJCC stage: pTX pNX    01/06/2022 - Appointment with :  • Patient has definitive diagnosis of cancer.  He has stage IV cancer of the epiglottis because it extends down into the true glottic area.  He also has left neck adenopathy.  I will get a PET scan and refer to radiation oncology, medical oncology.  I have discussed therapeutic options with the patient.  After he has seen consultants, he will decide on definitive therapy.  Surgical consideration would have to include laryngectomy given the extent of his tumor into the larynx.  • All care for the cancer patient  • Increase p.o. intake for nutrition  • Referral to radiation oncology and medical oncology   • Return in about 6 weeks (around 2/17/2022) for Recheck Larynx, possible flex scope.    01/12/2022 - PET Scan:  • There is a soft tissue mass involving the supraglottic larynx, right greater than left with effacement of the preglottic fat, involvement of the  epiglottis, effacement of the right piriform sinus and some involvement of the true vocal cord which is asymmetrically deviated medially. This mass shows maximum SUV of 9.2.   • There are 2 adjacent left side level 2 hypermetabolic metastatic lymph nodes, with maximum SUV of 7.8 and 4.3.  • There is a right level 2 lymph node showing maximum SUV of 4.5.   • A right level 2-3 lymph node showing maximum SUV of 6.6   • Left level 3 lymph node showing maximum SUV of 4.3.  Impression:  • Hypermetabolic glottic and supraglottic mass is consistent with primary laryngeal malignancy.  • Hypermetabolic bilateral level 2 and 3 lymph nodes (5 total, 3 on the left and 2 on the right) as described above are consistent with metastatic disease.    01/14/2022 - Consult with :  ASSESSMENT:  • Squamous cell carcinoma of the epiglottis.  o AJCC stage:IVB(cT4b, cN2c, cM0)  o Treatment status: Pending.  • Performance status of 0.  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  PLAN:   •  regarding the role for concurrent weekly cisplatin with radiation to minimize toxicity.  •  regarding potential adverse effects of chemotherapy especially infusion reactions, nausea, vomiting, cytopenias, neuropathy, ototoxicity, renal toxicity, alopecia and fatigue.  • Blood for CBC with differential, CMP and magnesium.  • Refer to Dr. Bird for concurrent chemo radiation.  • Schedule weekly chemo and radiation:  o Cisplatin 40 mg/m² D1.  • Pre-hydrate 1 L NS with 10 mEq KCl and 1 g magnesium.  Lasix 20 mg IV push after prehydration.  • Post hydrate with 1 L NS with 10 mEq KCl and 1 g magnesium.  Lasix 20 mg IV push after post hydration.  • Premed:  o Aloxi 0.25 mg.  o Emend 150 mg IV  o Decadron 12 mg IV.  • Weekly CBC with differential, CMP and magnesium once he starts chemo.  • eRx Zofran 8 mg p.o. every 8 hours as needed for nausea and vomiting #60 with 2  refills.  • eRx Compazine 10 mg p.o. every 4 hours as needed for nausea and vomiting #60 with 2 refills.  • eRx Zyprexa 5 mg po daily for 4 days, start first day of chemo, # 4, 3 refills.   • Continue care per primary care physician and other specialists.  • Plan of care discussed with patient.  Understand expressed.  Patient agreeable to proceed.  • Chemo education.  • Advance Care Planning  o ACP discussion was held with the patient during this visit. Patient has an advance directive (not in EMR), copy requested.  • Return to office in 4 weeks.  • See Shawna for possible research study.      History obtained from  PATIENT, FAMILY, and CHART    PAST MEDICAL HISTORY  Past Medical History:   Diagnosis Date   • Cataract     LEFT   • Hearing loss d/t noise     LEFT   • Neoplasm of epiglottis       PAST SURGICAL HISTORY  Past Surgical History:   Procedure Laterality Date   • LARYNGOSCOPY Bilateral 2021    Procedure: MICRODIRECT LARYNGOSCOPY WITH/WITHOUT LASER;  Surgeon: Robert Ayers Jr., MD;  Location: [facility] OR;  Service: ENT;  Laterality: Bilateral;   • PANENDOSCOPY Bilateral 2021    Procedure: DIRECT LARYNGOSCOPY, ESOPHAGOSCOPY, BRONCHOSCOPY;  Surgeon: Robert Ayers Jr., MD;  Location: [facility] OR;  Service: ENT;  Laterality: Bilateral;   • TEETH EXTRACTION     • TONSILLECTOMY        FAMILY HISTORY  family history includes Cancer in his mother and paternal grandmother.     SOCIAL HISTORY  Social History     Tobacco Use   • Smoking status: Former Smoker     Types: Cigars     Quit date: 2021     Years since quittin.8   • Smokeless tobacco: Never Used   Vaping Use   • Vaping Use: Never used   Substance Use Topics   • Alcohol use: Yes     Comment: OCC   • Drug use: Never     ALLERGIES  Patient has no known allergies.     MEDICATIONS    Current Outpatient Medications:   •  ipratropium (ATROVENT) 0.06 % nasal spray, 2 sprays into the nostril(s) as directed by provider 4 (Four) Times a Day  As Needed for Rhinitis. For drainage, Disp: 45 mL, Rfl: 3  •  OLANZapine (zyPREXA) 5 MG tablet, 1 tablet po daily for 4 days when he starts chemo., Disp: 30 tablet, Rfl: 1  •  ondansetron (Zofran) 8 MG tablet, Take 1 tablet by mouth Every 8 (Eight) Hours As Needed for Nausea or Vomiting., Disp: 90 tablet, Rfl: 2  •  POTASSIUM CHLORIDE PO, Take  by mouth Every Other Day., Disp: , Rfl:   •  prochlorperazine (COMPAZINE) 10 MG tablet, Take 1 tablet by mouth Every 4 (Four) Hours As Needed for Nausea or Vomiting., Disp: 90 tablet, Rfl: 2  •  fluticasone (FLONASE) 50 MCG/ACT nasal spray, 2 sprays into the nostril(s) as directed by provider Daily., Disp: , Rfl:     The following portions of the patient's history were reviewed and updated as appropriate: allergies, current medications, past family history, past medical history, past social history, past surgical history and problem list.     REVIEW OF SYSTEMS  Review of Systems   Constitutional: Positive for unexpected weight change (decrease of 60lbs since May 2021 per patient ). Negative for appetite change.   HENT: Positive for ear pain (Left ear ), sinus pressure, sore throat, tinnitus (Left ear ) and trouble swallowing (Improving per patient ).    Eyes: Negative for visual disturbance.        Glasses     Respiratory: Positive for cough (productive with clear sputum ). Negative for shortness of breath.    Cardiovascular: Negative.    Gastrointestinal: Negative for nausea and vomiting.   Endocrine: Negative.    Genitourinary:        Enlarged prostate      Musculoskeletal: Negative.    Skin: Negative.    Allergic/Immunologic: Negative.    Neurological: Negative for dizziness, light-headedness and headaches.   Hematological: Negative.    Psychiatric/Behavioral: Negative.       I have reviewed and confirmed the accuracy of the ROS as documented by the MA/LPN/RN Damian Bird III, MD    PHYSICAL EXAM  VITAL SIGNS:   Vitals:    01/17/22 0931   BP: 137/77   Pulse: 88   SpO2:  "94%  Comment: room air   Weight: 72.6 kg (160 lb)   Height: 172.7 cm (68\")   PainSc: 0-No pain      Physical Exam    General Appearance:  awake, alert, oriented, in no acute distress.  Head: Normocephalic  Eyes: Conjunctiva pink, pupils equal and reactive.   Ears:  Normal externally.    Nose/Sinuses:  Mucosa normal.   Mouth/Throat:  Mucosa moist, no lesions; pharynx without erythema, edema or exudate. See laryngoscopy note.  Neck: Supple, non-tender; immobile, left cervical lymphadenopathy noted   Back:  Symmetric, no curvature, ROM normal, no CVA tenderness   Lungs:  Normal expansion.  Clear to auscultation.  No rales, rhonchi, or wheezing.  Heart:  Heart sounds are normal.  Regular rate and rhythm without murmur, gallop or rub.  Abdomen:  Soft, non-tender, normal bowel sounds; no bruits, organomegaly or masses.  Extremities: Warm to touch, pink, with no edema.   Musculoskeletal: strength and sensation grossly normal  Neurologic:  Alert and oriented, gait normal, non-focal exam  Psych exam: normal situational behavior   Skin:  Warm and moist. No suspicious lesions or rashes of concern    Performance Status: ECOG (0) Fully active, able to carry on all predisease performance without restriction    Clinical Quality Measures  -Pain Documented by Standardized Tool, FPS  Enrique Coronado reports a pain score of 0. Given his pain assessment as noted, treatment options were discussed and the following options were decided upon as a follow-up plan to address the patient's pain: No pain, no plan given.   Pain Medications             OLANZapine (zyPREXA) 5 MG tablet 1 tablet po daily for 4 days when he starts chemo.    prochlorperazine (COMPAZINE) 10 MG tablet Take 1 tablet by mouth Every 4 (Four) Hours As Needed for Nausea or Vomiting.        -Advanced Care Planning Advance Care Planning   ACP discussion was held with the patient during this visit. Patient does not have an advance directive, information provided.    -Body Mass " Index Screening and Follow-Up Plan Patient's Body mass index is 24.33 kg/m². indicating that he is within normal range (BMI 18.5-24.9). No BMI management plan needed.    -Tobacco Use: Screening and Cessation Intervention Social History    Tobacco Use      Smoking status: Former Smoker        Types: Cigars        Quit date: 2021        Years since quittin.8      Smokeless tobacco: Never Used    ASSESSMENT AND PLAN  1. Squamous cell cancer of epiglottis (HCC)    2. Former smoker      Orders Placed This Encounter   Procedures   • Ambulatory Referral to OP [facility] Nutrition Services     Referral Priority:   Routine     Referral Type:   Clinical Pathway     Referral Reason:   Specialty Services Required     Number of Visits Requested:   1   • Ambulatory Referral to Speech Therapy     Referral Priority:   Routine     Referral Type:   Speech Pathology     Referral Reason:   Specialty Services Required     Requested Specialty:   Speech Pathology     Number of Visits Requested:   1     RECOMMENDATIONS: Enrique Coronado has been referred to our office today to discuss radiotherapy recommendations for head and neck cancer. Diagnosed with Stage SABRINA (T3m N2c, cM0) p16- Invasive Squamous Cell Carcinoma of the larynx with bilateral cervical lymphadenopathy.     We have discussed the indications and rationale of radiation therapy according to the NCCN Guidelines. I have extensively reviewed the risks, benefits and alternatives of therapy and progression of disease in spite of therapy with either local or systemic failure. I have seen, examined and reviewed his medication list, appropriate labs and imaging studies as well as other physician notes. We discussed the goals/plans of care and answered all questions.     After consideration of the diagnostic data and evaluation of the patient, I have recommended to treat the larynx with definitve radiation therapy, I anticipate a dose of 7000 cGy over 35 fractions, final course pending  completion of planning.    The patient and his family verbalize understanding of this discussion, voice no further questions and wish to proceed with recommendations. We will simulate treatment fields to begin the treatment planning.     Continue ongoing management per primary care physician and other specialists. Thank you for allowing me to assist in this patients care.    Patient Instructions   1) Plan on 35 treatments, Monday-Friday for 30 minutes each  2) Side effects may include fatigue, sore throat and mouth, loss of taste   3) we will call you when to start, in about 1-2 weeks.     Time Spent: I spent 56 minutes caring for Enrique on this date of service. This time includes time spent by me in the following activities: preparing for the visit, reviewing tests, obtaining and/or reviewing a separately obtained history, performing a medically appropriate examination and/or evaluation, counseling and educating the patient/family/caregiver, ordering medications, tests, or procedures, referring and communicating with other health care professionals, documenting information in the medical record and independently interpreting results and communicating that information with the patient/family/caregiver.   Damian Bird III, MD   01/17/2022

## 2022-01-17 NOTE — PROGRESS NOTES
ROXANA met with Mr. Coronado due to his distress score. He was here for chemo education and a radiation consultation for squamous cell cancer of epiglottis. He lives alone and his support system includes his two adult children. He does not have any financial or transportation concerns. He states is ex-wife and daughter will be staying with him for about two weeks once treatment starts. He is independent with his ADLs and plans to drive himself to treatment. He is feeing nervous due to the possible side effects of treatment and still adjusting to his diagnosis. The paperwork he was given was a little bit overwhelming for him. Emotional support provided. He has positive coping strategies and states, “I have lesia in God and he will get me through.” and this writer did speak to him about additional coping strategies. Mr. Coronado was very thankful and receptive with information provided. ROXANA will remain available.

## 2022-01-17 NOTE — PATIENT INSTRUCTIONS
1) Plan on 35 treatments, Monday-Friday for 30 minutes each  2) Side effects may include fatigue, sore throat and mouth, loss of taste   3) we will call you when to start, in about 1-2 weeks.

## 2022-01-17 NOTE — PROGRESS NOTES
Subjective     PATIENT NAME:  Enrique Cornoado  YOB: 1950  PATIENTS AGE:  71 y.o.  PATIENTS SEX:  male  DATE OF SERVICE:  01/17/2022  PROVIDER:  [facility] NURSE      ____________________PATIENT EDUCATION____________________    PATIENT EDUCATION:  Today I met with the patient to discuss the chemotherapy regimen recommended for treatment of his disease.  The patient was given explanation of treatment premed side effects including office policy that prohibits patients to drive if sedating medications are administered, MD explanation given regarding benefits, side effects, toxicities and goals of treatment.  The patient received a Chemotherapy/Biotherapy Plan Summary including diagnosis and specific treatment plan.    SIDE EFFECTS:  Common side effects were discussed with the patient.  Discussion included hair loss/discoloration, anemia/fatigue, infection/chills/fever, appetite, bleeding risk/precautions, constipation, diarrhea, mouth sores, taste alteration, loss of appetite,nausea/vomiting, peripheral neuropathy, skin/nail changes, rash, muscle aches/weakness, photosensitivity, weight gain/loss, hearing loss, dizzines, high blood pressure, heart damage, liver damage, lung damage, kidney damage, DVT/PE risk, fluid retention, pleural/pericardial effusion, somnolence, electrolyte/LFT imbalance, vein exercises and/or the possible need for vascular access/port placement.  The patient was advised that if complications occur, additional medical treatment is available.    Discussion also included side effects specific to drugs in the treatment plan, specifically Cisplatin.        A total of 25 minutes were spent with the patient, with 100% of time spent in education and counseling.

## 2022-01-20 NOTE — PROGRESS NOTES
January 20, 2022    Hello, may I speak with Enrique Coronado?    My name is THOMPSON      I am  with [facility]  [address]  [place] [address] [phone].    Before we get started may I verify your date of birth? 1950    I am calling to officially welcome you to our practice and ask about your recent visit. Is this a good time to talk? YES     Tell me about your visit with us. What things went well?  LIKED DR. LORENZO VERY MUCH. HEADED TO GO SEE DR. SCHAFER NOW.       We're always looking for ways to make our patients' experiences even better. Do you have recommendations on ways we may improve?  NO:    Overall were you satisfied with your first visit to our practice? YES        I appreciate you taking the time to speak with me today. Is there anything else I can do for you?  NO      Thank you, and have a great day.

## 2022-01-25 NOTE — PROGRESS NOTES
"Adult Outpatient Nutrition  Assessment/PES    Patient Name:  Enrique Coronado  YOB: 1950  MRN: [id number]    Assessment Date:  1/25/2022         General Info             Today's Session    Person(s) attending today's session Patient       General Information    Preferred Language English    Lives With alone    Oncology patient? yes  epiglottic cancer       Oncology Specific Assessment    Type of treatment Chemotherapy; Radiation    Goal of treatment Currative    Reported symptoms Constipation; Weight loss; Other (comment)  losing voice, minimal pain/soreness at this time; deals with chronic constipation                 Anthropometrics              Anthropometrics    Height 172.7 cm (68\") --       Ideal Body Weight (IBW)    Ideal Body Weight (IBW) (kg) 70.89 --       Usual Body Weight (UBW)    Usual Body Weight -- --  Pt reports UBW was 218-224 lbs in April/May 2021; Wt taken at radiation dept prior to starting treatment was 160 lbs on 1/17/22. Pt reports lowest wt was 141 lbs December 3, 2021    Weight Loss -- unintentional    Weight Loss Time Frame -- todays wt 155.8 lbs, loss of 4.2 lbs (2.6%) in the past wk                 Home Nutrition Report              Home Nutrition Report    Diet Vegetarian  no fish. does eat eggs and drinks milk     Typical Food/Fluid Intake Pt's biggest intake in the afternoons/evenings. Brkfst usually small if he eats at all, lunch is light (soup/sandwich), and then dinner is largest meal. Does usually drink 1-2 beers in the afternoon.                Estimated/Assessed Needs              Calculation Measurements    Weight Used For Calculations 72.6 kg (160 lb)     Height 172.7 cm (68\")            Estimated/Assessed Needs    Additional Documentation [place] Jeor Equation (Group); Fluid Requirements (Group); Protein Requirements (Group)            [place]-St. Jeor Equation    RMR ([place]. Jeor Equation) 1465.26     [place]. Jeor Activity Factors 1.4 - 1.5     " Activity Factors ([facility]) 2051.364 - 2197.89            Protein Requirements    Weight Used For Protein Calculations 72.6 kg (160 lb)     Est Protein Requirement Amount (gms/kg) 1.3 gm protein     Estimated Protein Requirements (gms/day) 94.35            Fluid Requirements    Fluid Requirements (mL/day) 2198     Estimated Fluid Requirement Method RDA Method     RDA Method (mL) 2198                       Problem/Interventions:   Problem 1              Nutrition Diagnoses Problem 1    Problem 1 Increased Nutrient Needs     Macronutrient Kcal; Protein; Fluid     Etiology (related to) Medical Diagnosis     Oncology Head/neck cancer     Signs/Symptoms (evidenced by) Unintended Weight Change; % UBW     Percent (%) UBW 71.5 %     Unintended Weight Change Loss     Number of Pounds Lost todays wt 155.8 lbs, loss of 4.2 lbs (2.6%) in the past wk                        Intervention Goal              Intervention Goal    General Meet nutritional needs for age/condition; Disease management/therapy     PO Increase intake; Meet estimated needs     Weight Maintain weight                    Education/Evaluation              Education    Education Provided education regarding; Education topics; Advised regarding habits/behavior     Provided education regarding Nutrition related factor; Diet rationale; Key food habit change     Education Topics Basic nutrition; Weight management - maintain     Advised Regarding Habits/Behavior Food choices; Snacks; Increased nutrient density; Use supplement; Meal planning; Eating pattern  goals this week: minimum 3 meals/d (brkfst, lunch, dinner) and minimum 1 protein supplement daily (currently has Premeir protein drinks at time)            Monitor/Evaluation    Monitor Per protocol     Education Follow-up Reinforce PRN                 Electronically signed by:  Rosario Martinez RDN, LD  01/25/22 11:51 CST

## 2022-02-01 NOTE — PROGRESS NOTES
Adult Outpatient Nutrition  Assessment/PES    Patient Name:  Enrique Coronado  YOB: 1950  MRN: [id number]    Assessment Date:  2/1/2022         General Info     Row Name 02/01/22 1430       Today's Session    Person(s) attending today's session Patient       General Information    Preferred Language English    Oncology patient? yes       Oncology Specific Assessment    Type of treatment Chemotherapy; Radiation  epiglottis    Reported symptoms Dry mouth; Weight loss; Constipation  denies taste changes; does have sensitivity to cold               Physical Findings     Row Name 02/01/22 1440          Physical Findings    Gastrointestinal constipation; other (see comments)  is drinking Prune juice, can discussion medications with MD/PCP     Tubes other (see comments)  Pt has grandson that historically had a PEG, he seems open to the idea of getting one if necessary but wants to continue to be able to eat for as long as he can                Anthropometrics     Row Name 02/01/22 1441          Usual Body Weight (UBW)    Usual Body Weight --  Reported UBW was 218-224 lbs in April/May 2021; wt taken at radiation dept prior to starting treatment was 160 lbs on 1/17/22. Pt reports lowest wt was 141 lbs on Dec 3, 2021     Weight Loss Time Frame wt today 155 lbs; loss of 0.8 lbs in the past wk                  Home Nutrition Report     Row Name 02/01/22 1445          Home Nutrition Report    Typical Food/Fluid Intake 24 hr intake: 2 fried eggs with [place]ian dressing and a biscuit. sandwich with cheese and milk for lunch, martinez beans, cornbread, fried pototes, a protein shake, 4 bottles of water, large bottle of gatorade, prune juice, and 2 beers                Estimated/Assessed Needs     Row Name 02/01/22 1446          Estimated/Assessed Needs    Additional Documentation --  from initial eval 5409-4306 kcal/d and 94 gm PRO/d                       Problem/Interventions:   Problem 1     Row Name 02/01/22 0565           Nutrition Diagnoses Problem 1    Problem 1 Increased Nutrient Needs     Macronutrient Kcal; Protein; Fluid     Etiology (related to) Medical Diagnosis     Oncology Head/neck cancer     Signs/Symptoms (evidenced by) Unintended Weight Change     Unintended Weight Change Loss     Number of Pounds Lost loss of 0.8 lbs in the past wk; net loss 5 lbs (3%) in 2 wks                        Intervention Goal     Row Name 02/01/22 1448          Intervention Goal    General Meet nutritional needs for age/condition; Disease management/therapy     PO Increase intake; Meet estimated needs     Weight Maintain weight                    Education/Evaluation     Row Name 02/01/22 1450          Education    Education Advised regarding habits/behavior     Advised Regarding Habits/Behavior Use supplement; Food choices; Increased nutrient density; Other (comment)  adequate fluid intake, advised limited ETOH intake, encouraged great progress with 3 nutrient dense meals/d and continue supplement daily. Encouraged intake            Monitor/Evaluation    Monitor Per protocol  anticipate weekly followup throughout treatment                 Electronically signed by:  Rosario Martinez RDN, [place]  02/01/22 14:51 CST

## 2022-02-01 NOTE — THERAPY EVALUATION
Outpatient Speech Language Pathology   Adult Swallow Initial Evaluation       Patient Name: Enrique Coronado  : 1950  MRN: [id number]  Today's Date: 2022         Visit Date: 2022   Patient Active Problem List   Diagnosis   • Squamous cell cancer of epiglottis (HCC)   • Mass of left side of neck   • Former smoker        Past Medical History:   Diagnosis Date   • Cataract     LEFT   • Hearing loss d/t noise     LEFT   • Neoplasm of epiglottis         Past Surgical History:   Procedure Laterality Date   • LARYNGOSCOPY Bilateral 2021    Procedure: MICRODIRECT LARYNGOSCOPY WITH/WITHOUT LASER;  Surgeon: Robert Ayers Jr., MD;  Location: [facility] OR;  Service: ENT;  Laterality: Bilateral;   • PANENDOSCOPY Bilateral 2021    Procedure: DIRECT LARYNGOSCOPY, ESOPHAGOSCOPY, BRONCHOSCOPY;  Surgeon: Robert Ayers Jr., MD;  Location: [facility] OR;  Service: ENT;  Laterality: Bilateral;   • TEETH EXTRACTION     • TONSILLECTOMY           Visit Dx:     ICD-10-CM ICD-9-CM   1. Oropharyngeal dysphagia  R13.12 787.22            OP SLP Assessment/Plan - 22 1507        SLP Assessment    Functional Problems Swallowing  -MM    Impact on Function: Swallowing Risk of malnourishment; Risk of dehydration; Risk of aspiration; Risk of pneumonia; Impact on social aspects of eating  -MM    Clinical Impression: Swallowing Mild:; pharyngeal phase dysphagia  -MM    Functional Problems Comment Occasional s/s of aspiration, vocal quality clear following, no concerns with increased congestion  -MM    Please refer to paper survey for additional self-reported information Yes  -MM    Please refer to items scanned into chart for additional diagnostic informaiton and handouts as provided by clinician Yes  -MM    SLP Diagnosis Dysphagia  -MM    Prognosis Good (comment)  -MM    Patient/caregiver participated in establishment of treatment plan and goals Yes  -MM    Patient would benefit from skilled therapy  intervention Yes  -MM       SLP Plan    Frequency 1x/week  -MM    Duration until discharge  -MM    Planned CPT's? SLP MBS: 59547; SLP FEES: 33381; SLP CLINICAL SWALLOW EVAL: 94800; SLP SWALLOW THERAPY: 20094  -MM    Expected Duration of Therapy Session (SLP Eval) 60  -MM    Plan Comments Continue to follow.  -MM          User Key  (r) = Recorded By, (t) = Taken By, (c) = Cosigned By    Initials Name Provider Type    MM Stephanie Layton MS CCC-SLP Speech and Language Pathologist                 SLP Adult Swallow Evaluation     Row Name 02/01/22 0955       Rehab Evaluation    Document Type evaluation  -MM    Subjective Information no complaints  -MM    Patient Observations alert; cooperative; agree to therapy  -MM    Patient/Family/Caregiver Comments/Observations No family present.  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  -MM    Current Method of Nutrition regular textures; thin liquids  -MM    Precautions/Limitations, Vision WFL; for purposes of eval  -MM    Precautions/Limitations, Hearing hearing impairment, left  -MM    Prior Level of Function-Communication WFL  -MM    Prior Level of Function-Swallowing no diet consistency restrictions  -MM    Plans/Goals Discussed with patient  -MM    Barriers to Rehab none identified  -MM    Patient's Goals for Discharge --  Continue PO diet  -MM       Pain    Additional Documentation Pain Scale: Word Pre/Post-Treatment (Group)  -MM       Pain Scale: Word Pre/Post-Treatment    Pain: Word Scale, Pretreatment 0 - no pain  -MM    Posttreatment Pain Rating 0 - no pain  -MM    Pre/Posttreatment Pain Comment States some mild discomfort.  -MM       Oral Motor Structure and Function    Dentition  Assessment upper dentures/partial in place; lower dentures/partial in place  -MM    Secretion Management WNL/WFL  -MM    Mucosal Quality dry  Patient reports xerostomia  -MM    Volitional Swallow WFL  -MM    Volitional Cough WFL  -MM       Oral Musculature and Cranial Nerve Assessment    Oral Motor General Assessment vocal impairment  -MM    Vocal Impairment, Detail. Cranial Nerve X (Vagus) CN10: Motor; vocal quality abnormality (see comments)  -MM       General Eating/Swallowing Observations    Respiratory Support Currently in Use room air  -MM    Eating/Swallowing Skills self-fed  -MM    Positioning During Eating upright in chair  -MM    Utensils Used cup  -MM    Consistencies Trialed thin liquids  -MM       Clinical Swallow Eval    Oral Prep Phase WFL  -MM    Oral Transit WFL  -MM    Oral Residue WFL  -MM    Pharyngeal Phase suspected pharyngeal impairment  -MM    Esophageal Phase unremarkable  -MM    Clinical Swallow Evaluation Summary See note.  -MM       Pharyngeal Phase Concerns    Pharyngeal Phase Concerns cough  -MM    Cough thin  vocal quality clear following.  -MM       SLP Evaluation Clinical Impression    SLP Swallowing Diagnosis R/O pharyngeal dysphagia  -MM    Functional Impact risk of aspiration/pneumonia; risk of malnutrition; risk of dehydration  -MM    Rehab Potential/Prognosis, Swallowing re-evaluate goals as necessary  -MM    Swallow Criteria for Skilled Therapeutic Interventions Met demonstrates skilled criteria  -MM       Recommendations    Therapy Frequency (Swallow) 1 day per week  until completion of radiation tx with 6 week follow up.  -MM    Predicted Duration Therapy Intervention (Days) until discharge  -MM    SLP Diet Recommendation regular textures; thin liquids  -MM    Recommended Diagnostics VFSS (MBS); FEES  If any continued concerns  -MM    Recommended Precautions and Strategies upright posture during/after eating; small bites of food and sips of liquid; volitional throat clear;  general aspiration precautions; fatigue precautions  -MM    Oral Care Recommendations Oral Care before breakfast, after meals and PRN; Toothbrush  -MM    SLP Rec. for Method of Medication Administration meds whole; as tolerated  -MM    Monitor for Signs of Aspiration yes; notify SLP if any concerns  -MM    Anticipated Discharge Disposition (SLP) home  -MM          User Key  (r) = Recorded By, (t) = Taken By, (c) = Cosigned By    Initials Name Provider Type    Stephanie Lopez MS CCC-SLP Speech and Language Pathologist                               OP SLP Education     Row Name 02/01/22 1509       Education    Barriers to Learning No barriers identified  -MM    Education Provided Described results of evaluation; Patient expressed understanding of evaluation; Patient participated in establishing goals and treatment plan; Patient requires further education on strategies, risks  -MM    Assessed Learning needs; Learning motivation; Learning preferences; Learning readiness  -MM    Learning Motivation Strong  -MM    Learning Method Explanation; Written materials  -MM    Teaching Response Verbalized understanding  -MM    Education Comments SLP plan of care provided with info on dysphagia, effects of radiation on swallow, oral care, dry mouth, and swallow exercises.  -MM          User Key  (r) = Recorded By, (t) = Taken By, (c) = Cosigned By    Initials Name Effective Dates    Stephanie Lopez MS CCC-SLP 06/16/21 -                SLP OP Goals     Row Name 02/01/22 1511 02/01/22 0943       Goal Type Needed    Goal Type Needed -- Dysphagia; Other Adult Goals  -MM       Subjective Comments    Subjective Comments -- Patient was seen today for an evaluation of his swallowing. Diagnosed with head and neck cancer and has just started chemoradiation treatment. He was upright in chair receiving chemo treatment during initial visit. He was alert and cooperative. Session completed with ENRIQUETA Ponce present.  -MM        Subjective Pain    Able to rate subjective pain? -- yes  -MM    Pre-Treatment Pain Level -- 0  -MM    Post-Treatment Pain Level -- 0  -MM    Subjective Pain Comment -- Verbally reported mild discomfort but states it is bearable.  -MM       Dysphagia Goals    Dysphagia LTG's -- Patient will safely consume the recommended diet without complications such as aspiration pneumonia  -MM    Patient will safely consume the recommended diet without complications such as aspiration pneumonia -- Will consume recommended diet safely without complications.  -MM    Status: Patient will safely consume the recommended diet without complications such as aspiration pneumonia -- New  -MM    Comments: Patient will safely consume the recommended diet without complications such as aspiration pneumonia -- Patient currently consuming a regular diet with thin liquids. Reports eating beans, cornbread, eggs, biscuits. Reports no concern with increased congestion.  -MM       Other Goals    Other Adult Goal- 1 -- Patient will complete swallowing exercises to maintain current level of swallow function during and following radiation treatment.  -MM    Status: Other Adult Goal- 1 -- New  -MM    Comments: Other Adult Goal- 1 -- Exercises provided and reviewed with patient.  -MM    Other Adult Goal- 2 -- Patient will maintain weight and nutrition PO.  -MM    Status: Other Adult Goal- 2 -- New  -MM    Comments: Other Adult Goal- 2 -- Weight is stable at 155.4lbs. Had recent 60lb weight loss prior to initial diagnosis.  -MM    Other Adult Goal- 3 -- Patient will continue to be educated on effects of radiation on swallow function.  -MM    Status: Other Adult Goal- 3 -- New  -MM    Comments: Other Adult Goal- 3 -- Completed initial education and provided SLP plan of care for treatment throughout radiation treatment.  -MM       SLP Time Calculation    SLP Goal Re-Cert Due Date 05/01/22  -MM 05/01/22  -MM          User Key  (r) = Recorded By, (t) = Taken  By, (c) = Cosigned By    Initials Name Provider Type    Stephanie Lopez, MS CCC-SLP Speech and Language Pathologist                       Time Calculation:   SLP Start Time: 0955  SLP Stop Time: 1132  SLP Time Calculation (min): 97 min  Untimed Charges  SLP Eval/Re-eval : ST Eval Oral Pharyng Swallow - 28406  30168-IV Eval Oral Pharyng Swallow Minutes: 97  Total Minutes  Untimed Charges Total Minutes: 97   Total Minutes: 97                 Stephanie Layton MS CCC-SLP  2/1/2022

## 2022-02-08 NOTE — PROGRESS NOTES
"Adult Outpatient Nutrition  Assessment/PES    Patient Name:  Enrique Coronado  YOB: 1950  MRN: [id number]    Assessment Date:  2/8/2022         General Info     Row Name 02/08/22 1316       Today's Session    Person(s) attending today's session Patient       General Information    Preferred Language English    Oncology patient? yes  squamous cell cancer of epiglottis       Oncology Specific Assessment    Type of treatment Chemotherapy; Radiation    Frequency of treatment Pt states today is his 3rd and last Chemo; he has 10th or 11th of 35 radiation treatments today    Reported symptoms Taste changes; Weight loss; Dry mouth; Other (comment); Constipation  having \"throat tightness\" some difficulty swallowing; some pain 3/10, not using lidocaine rinse               Physical Findings     Row Name 02/08/22 1319          Physical Findings    Gastrointestinal constipation  states he's been using prune juice and enema as needed                Anthropometrics     Row Name 02/08/22 1319          Usual Body Weight (UBW)    Usual Body Weight --  reported -224 lbs in April/May 2021, wt taken at XRT apt on 1/17/22 160 lbs. Pt states lowest was 141 lbs in Dec 2021     Weight Loss Time Frame Today's wt 152 lbs, loss of 3 lbs this wk; net loss 8 lbs (5%) in 3 wks                  Home Nutrition Report     Row Name 02/08/22 1324          Home Nutrition Report    Diet Vegetarian     Typical Food/Fluid Intake 24 hr intake: Cabbage, cornbread, mratinez beans, pumpkin pie roll, cherry pie, cheddar cheese corn, 4 glasses of whole milk, 3 Premeir shakes daily            Nutrition Prescription EN    Enteral Route --  per discussion on 2/1/22, Pt had grandson with PEG historically, Pt seems open to idea of one if needed. Didn't discuss further today                Estimated/Assessed Needs     Row Name 02/08/22 1327          Estimated/Assessed Needs    Additional Documentation --  from initial eval 0714-5108 kcal/d and 94 gm " PRO/d                       Problem/Interventions:   Problem 1     Row Name 02/08/22 1327          Nutrition Diagnoses Problem 1    Problem 1 Increased Nutrient Needs     Macronutrient Kcal; Protein; Fluid     Etiology (related to) Medical Diagnosis     Oncology Head/neck cancer     Signs/Symptoms (evidenced by) Unintended Weight Change     Unintended Weight Change Loss     Number of Pounds Lost net loss 8 lbs (5%)     Weight loss time period 3 wks                        Intervention Goal     Row Name 02/08/22 1331          Intervention Goal    General Meet nutritional needs for age/condition     PO Increase intake; Meet estimated needs     Weight Maintain weight                    Education/Evaluation     Row Name 02/08/22 1332          Education    Education Provided education regarding; Education topics; Advised regarding habits/behavior     Provided education regarding Diet rationale     Education Topics Weight management - maintain     Advised Regarding Habits/Behavior Increased nutrient density; Use supplement  increase kcal and protein in food, not just eating sweets.            Monitor/Evaluation    Monitor Per protocol     Education Follow-up Reinforce PRN               Recommendation Diet; SupplementRecommendation. Diet; Supplement. Encouraged higher kcal supplements, minimum x1/d or mixing Premeir Protein shakes with ice cream to increase caloric density.    Education/Counseling Comprehensive; Ongoing reinforcement   Strategies Used Goal setting         Electronically signed by:  Rosario Martinez RDN, [place]  02/08/22 13:33 CST

## 2022-02-08 NOTE — THERAPY TREATMENT NOTE
Outpatient Speech Language Pathology   Adult Swallow Treatment Note       Patient Name: Enrique Coronado  : 1950  MRN: [id number]  Today's Date: 2022         Visit Date: 2022   Patient Active Problem List   Diagnosis   • Squamous cell cancer of epiglottis (HCC)   • Mass of left side of neck   • Former smoker        Visit Dx:    ICD-10-CM ICD-9-CM   1. Dysphagia, unspecified type  R13.10 787.20        OP SLP Assessment/Plan - 22 1345        SLP Assessment    Clinical Impression Comments Progressing as expected  -CS       SLP Plan    Plan Comments Continue to follow and treat.  -CS          User Key  (r) = Recorded By, (t) = Taken By, (c) = Cosigned By    Initials Name Provider Type    CS Esteban Chapman MS CCC-SLP Speech and Language Pathologist                                   SLP OP Goals     Row Name 22 1030          Goal Type Needed    Goal Type Needed Dysphagia  -CS            Subjective Pain    Able to rate subjective pain? yes  -CS     Pre-Treatment Pain Level 0  -CS     Post-Treatment Pain Level 0  -CS            Dysphagia Goals    Dysphagia LTG's Patient will safely consume the recommended diet without complications such as aspiration pneumonia  -CS     Patient will safely consume the recommended diet without complications such as aspiration pneumonia Will consume recommended diet safely without complications.  -CS     Status: Patient will safely consume the recommended diet without complications such as aspiration pneumonia Progressing as expected  -CS     Comments: Patient will safely consume the recommended diet without complications such as aspiration pneumonia Patient currently consuming a regular diet with thin liquids. Reports eating beans, cornbread, eggs, biscuits. Reports no concern with increased congestion.  -CS            Other Goals    Other Adult Goal- 1 Patient will complete swallowing exercises to maintain current level of swallow function during and following  radiation treatment.  -CS     Status: Other Adult Goal- 1 Progressing as expected  -CS     Comments: Other Adult Goal- 1 Pt reports he has only been completing 3 reps a day. SLP educated pt on completeing 3 sets of 10 reps 3x a day as well as individual exercises. Laryngeal movement is decreased with all swallows complete.  -CS     Other Adult Goal- 2 Patient will maintain weight and nutrition PO.  -CS     Status: Other Adult Goal- 2 Progressing as expected  -CS     Comments: Other Adult Goal- 2 Pt has lost 3 lbs since last week.  -CS     Other Adult Goal- 3 Patient will continue to be educated on effects of radiation on swallow function.  -CS     Status: Other Adult Goal- 3 Progressing as expected  -CS     Comments: Other Adult Goal- 3 Pt educated on oral care as well as the need to increase exercises. Pt also educated on s/s of aspiration.  -CS            SLP Time Calculation    SLP Goal Re-Cert Due Date 05/01/22  -CS           User Key  (r) = Recorded By, (t) = Taken By, (c) = Cosigned By    Initials Name Provider Type    Esteban Oh MS CCC-SLP Speech and Language Pathologist               OP SLP Education     Row Name 02/08/22 1346       Education    Barriers to Learning No barriers identified  -CS    Education Provided Patient requires further education on strategies, risks  -CS    Assessed Learning needs; Learning motivation; Learning preferences; Learning readiness  -CS    Learning Motivation Strong  -CS    Learning Method Explanation  -CS    Teaching Response Verbalized understanding  -CS    Education Comments See flowsheet data.  -CS          User Key  (r) = Recorded By, (t) = Taken By, (c) = Cosigned By    Initials Name Effective Dates    Esteban Oh MS CCC-SLP 06/16/21 -                     Time Calculation:   SLP Start Time: 1030  SLP Stop Time: 1108  SLP Time Calculation (min): 38 min  Untimed Charges  70766-NN Treatment Swallow Minutes: 38  Total Minutes  Untimed Charges Total Minutes: 38    Total Minutes: 38                 Esteban Chapman, MS CCC-SLP  2/8/2022

## 2022-02-15 NOTE — PROGRESS NOTES
Adult Outpatient Nutrition  Assessment/PES    Patient Name:  Enrique Coronado  YOB: 1950  MRN: [id number]    Assessment Date:  2/15/2022         General Info     Row Name 02/15/22 1650       Today's Session    Person(s) attending today's session Patient       General Information    Preferred Language English    Oncology patient? yes  squamous cell cancer of epiglottis       Oncology Specific Assessment    Type of treatment Chemotherapy; Radiation    Goal of treatment Currative    Reported symptoms Taste changes; Constipation; Weight loss               Physical Findings     Row Name 02/15/22 1650          Physical Findings    Gastrointestinal constipation  using an enema every other day to stay regular; Pt comfortable with this                Anthropometrics     Row Name 02/15/22 1652          Usual Body Weight (UBW)    Usual Body Weight --  reported -224 lbs in April/May 2021, wt taken at [facility] apt on 1/17/22 160 lbs. Pt states lowest was 141 lbs in Dec 2021     Weight Loss Time Frame Today's wt 149 lbs; loss of 3 lbs this wk; net loss 11 lbs (6.9%) in 1 month                  Home Nutrition Report     Row Name 02/15/22 1653          Home Nutrition Report    Typical Food/Fluid Intake intake has declined over the past wk d/t limited to no taste     Supplemental Drinks/Foods/Additives 2-3 premeir shakes, low tony high protein                Estimated/Assessed Needs     Row Name 02/15/22 1655          Estimated/Assessed Needs    Additional Documentation --  from initial eval 4067-1274 kcal/d and 94 gm PRO/d                       Problem/Interventions:   Problem 1     Row Name 02/15/22 1658          Nutrition Diagnoses Problem 1    Problem 1 Increased Nutrient Needs     Macronutrient Kcal; Protein; Fluid     Etiology (related to) Medical Diagnosis     Oncology Head/neck cancer     Signs/Symptoms (evidenced by) Unintended Weight Change     Unintended Weight Change Loss     Number of Pounds Lost net loss  11 lbs (6.9%) in 1 month                        Intervention Goal     Row Name 02/15/22 1658          Intervention Goal    General Meet nutritional needs for age/condition     PO Increase intake; Meet estimated needs     Weight Maintain weight                    Education/Evaluation     Row Name 02/15/22 1823          Education    Education Provided education regarding     Advised Regarding Habits/Behavior Use supplement; Increased nutrient density  eating snacks every 2 hrs instead of just 2 meals/d. Using Ensure Complete or Ensure Plus BID instead of Premeir shakes.            Monitor/Evaluation    Monitor Per protocol     Education Follow-up Reinforce PRN                 Electronically signed by:  Rosario Martinez RDN, [place]  02/15/22 16:59 CST

## 2022-02-15 NOTE — THERAPY TREATMENT NOTE
Outpatient Speech Language Pathology   Adult Swallow Treatment Note       Patient Name: Enrique Coronado  : 1950  MRN: [id number]  Today's Date: 2/15/2022         Visit Date: 02/15/2022   Patient Active Problem List   Diagnosis   • Squamous cell cancer of epiglottis (HCC)   • Mass of left side of neck   • Former smoker        Visit Dx:    ICD-10-CM ICD-9-CM   1. Dysphagia, unspecified type  R13.10 787.20                              SLP OP Goals     Row Name 02/15/22 0903          Goal Type Needed    Goal Type Needed Dysphagia  -MM            Subjective Comments    Subjective Comments Patient was alert and cooperative. Seen in the cancer center during chemo treatment in conjunction with RD.  -MM            Subjective Pain    Able to rate subjective pain? yes  -MM     Pre-Treatment Pain Level 0  -MM     Post-Treatment Pain Level 0  -MM            Dysphagia Goals    Dysphagia LTG's Patient will safely consume the recommended diet without complications such as aspiration pneumonia  -MM     Patient will safely consume the recommended diet without complications such as aspiration pneumonia Will consume recommended diet safely without complications.  -MM     Status: Patient will safely consume the recommended diet without complications such as aspiration pneumonia Progressing as expected  -MM     Comments: Patient will safely consume the recommended diet without complications such as aspiration pneumonia Continues a regular diet with thin liquids. Improved toleration today of thin liquids. No overt s/s of aspiration observed. Reports loss of taste and only mild discomfort.  -MM            Other Goals    Other Adult Goal- 1 Patient will complete swallowing exercises to maintain current level of swallow function during and following radiation treatment.  -MM     Status: Other Adult Goal- 1 Progressing as expected  -MM     Comments: Other Adult Goal- 1 Educated regarding importance of continued swallowing exercise.  Patient reports he has been completing daily when he thinks about it. He has written exercise guide at home per his report.  -MM     Other Adult Goal- 2 Patient will maintain weight and nutrition PO.  -MM     Status: Other Adult Goal- 2 Progressing as expected  -MM     Comments: Other Adult Goal- 2 149.5 lbs per patient report. Maintaining per RD report with only mild flucuations.  -MM     Other Adult Goal- 3 Patient will continue to be educated on effects of radiation on swallow function.  -MM     Status: Other Adult Goal- 3 Progressing as expected  -MM     Comments: Other Adult Goal- 3 Educated on need for continued excercies, oral care, and water intake.  -MM            SLP Time Calculation    SLP Goal Re-Cert Due Date 05/01/22  -MM           User Key  (r) = Recorded By, (t) = Taken By, (c) = Cosigned By    Initials Name Provider Type    Stephanie Lopez MS CCC-SLP Speech and Language Pathologist               OP SLP Education     Row Name 02/15/22 0952       Education    Barriers to Learning No barriers identified  -MM    Education Provided Patient requires further education on strategies, risks  -MM    Assessed Learning needs; Learning motivation; Learning preferences; Learning readiness  -MM    Learning Motivation Strong  -MM    Learning Method Explanation  -MM    Teaching Response Verbalized understanding  -MM    Education Comments See flowsheet  -MM          User Key  (r) = Recorded By, (t) = Taken By, (c) = Cosigned By    Initials Name Effective Dates    Stephanie Lopez MS CCC-SLP 06/16/21 -                     Time Calculation:   SLP Start Time: 0900  SLP Stop Time: 0953  SLP Time Calculation (min): 53 min  Untimed Charges  93144-PD Treatment Swallow Minutes: 53  Total Minutes  Untimed Charges Total Minutes: 53   Total Minutes: 53                 Stephanie Layton MS CCC-SLP  2/15/2022

## 2022-02-21 NOTE — PROGRESS NOTES
[facility] HEMATOLOGY & ONCOLOGY  [address]  [place] [address] [phone]    Patient Name: Enrique Coronado  Encounter Date: 02/22/2022  YOB: 1950  Patient Number: [id number]      REASON FOR FOLLOW-UP: Enrique Coronado is a pleasant 71 y.o.  male who is seen on follow-up for squamous cell carcinoma, epiglottis.  He is seen week 5 of cisplatin with radiation. He is seen alone. History is obtained from patient. History is considered to be accurate.         DIAGNOSTIC ABNORMALITIES:  He presented with left-sided neck mass 04/2021.  Patient had progressive symptoms with sinus drainage and unable to eat.  He has lost 60 pounds since 05/2021.  He was seen by Dr. Robert Ayers on 12/17/2021.  Examination showed a 3 cm firm immobile node, level 3, left neck.  Laryngoscopy showed pooling, erythema, questionable lesion in the inferior vallecula adjacent to the epiglottis.  Lingual tonsil hypertrophy.  Granulated lesion involving the lingual and laryngeal surface thickening of the epiglottis standing down the epiglottis into the petiole area and on to the anterior false cords.  Reduced mobility through vocal cords.  Biopsy x2 left neck mass.  Pathology report 12/20/2021 left neck fine-needle aspiration: Positive for malignancy metastatic squamous cell carcinoma.  CBC 12/20/2021 revealed a WBC of 8.1, hemoglobin 15.4, and platelets 194.  BMP remarkable for CO2 of 30.  He had undergone direct laryngoscopy, esophagoscopy, bronchoscopy, and MicroDirect laryngoscopy 12/27/2021.  Base of tongue mass noted in the vallecula that is quite firm, extending onto the lingual surface of the epiglottis, right tonsillar hypertrophy.  Supraglottic/epiglottic entirely consumed with firm tumor, both lingual and laryngeal surfaces, extending to the area epiglottic folds midway; arytenoids appear intact.,  Hypertrophied.  Glottis false vocal cords  "with anterior involvement of the tumor extending down to the petiole and out onto the false vocal cords; true vocal cords appear unaffected by this, with normal mucosal surface of the subglottic neck, no tumor or scarring.  Follow-up with Dr. Robert Ayers on 01/06/2022.  Patient diagnosed with epiglottic cancer.  The tumor extended down to the glottic proper on the false vocal folds.  AJCC stage(cT4b, cN2b, cM0).  PET was ordered.  Patient referred to medical and radiation oncology.  Follow-up 02/17/2022.  PET scan 01/12/2022.   Hypermetabolic glottic and supraglottic mass is consistent with primary laryngeal malignancy. Hypermetabolic bilateral level 2 and 3 lymph nodes (5 total, 3 on the left and 2 on the right) as described above are consistent with  metastatic disease.      PREVIOUS INTERVENTIONS:  Cisplatin weekly with radiation 01/25/2022 through present.  I got 15 radiation to go.\"        Oncology/Hematology History   Squamous cell cancer of epiglottis (HCC)    Initial Diagnosis    Squamous cell cancer of epiglottis (HCC)     12/15/2021 Biopsy    Final Diagnosis   Lymph node, left neck, fine-needle aspiration:  Positive for malignancy.  Metastatic squamous cell carcinoma.        12/27/2021 Biopsy    Final Diagnosis   1.  Epiglottis, biopsy:  A.  Keratinizing squamous cell carcinoma, invasive.  B.  Maximum depth of invasion is 1.4 mm.  C.  No perineural invasion identified.  D.  p16 negative.    2.  Base of tongue, biopsy:  A.  Low-grade dysplasia of squamous mucosa.  B.  Reactive lymphoid hyperplasia in the subepithelial stroma.  C.  No histologic evidence of malignancy.    D.  Left false vocal cord, biopsy:  A.  Keratinizing squamous cell carcinoma, invasive.  B.  Tumor invades to the depth of the biopsy (depth of invasion at least 1.91 mm).  C.  No perineural invasion identified.  D.  p16 negative.    4.  Right false vocal cord, biopsy:  A.  Keratinizing squamous cell carcinoma, invasive.  B.  Tumor " invades to the depth of the biopsy (depth of invasion at least 2.44 mm).  C.  No perineural invasion identified.  E.  p16 negative.    AJCC stage: pTX pNX        1/12/2022 Imaging    PET/CT:  IMPRESSION:  1.  Hypermetabolic glottic and supraglottic mass is consistent with  primary laryngeal malignancy.  2.  Hypermetabolic bilateral level 2 and 3 lymph nodes (5 total, 3 on  the left and 2 on the right) as described above are consistent with  metastatic disease.     1/25/2022 -  Chemotherapy    OP HEAD & NECK CISplatin (Weekly) + XRT     2/2/2022 Cancer Staged    Staging form: Cutaneous Carcinoma Of The Head And Neck, AJCC 8th Edition  - Pathologic stage from 2/2/2022: Stage IV (pT4b, pN2c, cM0) - Signed by Brad Rubin MD on 2/2/2022         PAST MEDICAL HISTORY:  ALLERGIES:  No Known Allergies  CURRENT MEDICATIONS:  Outpatient Encounter Medications as of 2/22/2022   Medication Sig Dispense Refill   • cetirizine (zyrTEC) 10 MG tablet Take 10 mg by mouth Daily.     • fluticasone (FLONASE) 50 MCG/ACT nasal spray 2 sprays into the nostril(s) as directed by provider Daily.     • ipratropium (ATROVENT) 0.06 % nasal spray 2 sprays into the nostril(s) as directed by provider 4 (Four) Times a Day As Needed for Rhinitis. For drainage 45 mL 3   • Lidocaine Viscous HCl (XYLOCAINE) 2 % solution Take 5 mL by mouth 4 (Four) Times a Day As Needed for Moderate Pain . 100 mL 2   • OLANZapine (zyPREXA) 5 MG tablet 1 tablet po daily for 4 days when he starts chemo. 30 tablet 1   • ondansetron (Zofran) 8 MG tablet Take 1 tablet by mouth Every 8 (Eight) Hours As Needed for Nausea or Vomiting. 90 tablet 2   • POTASSIUM CHLORIDE PO Take  by mouth Every Other Day.     • prochlorperazine (COMPAZINE) 10 MG tablet Take 1 tablet by mouth Every 4 (Four) Hours As Needed for Nausea or Vomiting. 90 tablet 2     No facility-administered encounter medications on file as of 2/22/2022.     ADULT ILLNESSES:  Patient Active Problem List   Diagnosis  "Code   • Squamous cell cancer of epiglottis (HCC) C32.1   • Mass of left side of neck R22.1   • Former smoker Z87.891     SURGERIES:  Past Surgical History:   Procedure Laterality Date   • LARYNGOSCOPY Bilateral 2021    Procedure: MICRODIRECT LARYNGOSCOPY WITH/WITHOUT LASER;  Surgeon: Robert Ayers Jr., MD;  Location: [facility] OR;  Service: ENT;  Laterality: Bilateral;   • PANENDOSCOPY Bilateral 2021    Procedure: DIRECT LARYNGOSCOPY, ESOPHAGOSCOPY, BRONCHOSCOPY;  Surgeon: Robert Ayers Jr., MD;  Location: [facility] OR;  Service: ENT;  Laterality: Bilateral;   • TEETH EXTRACTION     • TONSILLECTOMY       HEALTH MAINTENANCE ITEMS:  Health Maintenance Due   Topic Date Due   • COLORECTAL CANCER SCREENING  Never done   • COVID-19 Vaccine (1) Never done   • TDAP/TD VACCINES (1 - Tdap) Never done   • ZOSTER VACCINE (1 of 2) Never done   • Pneumococcal Vaccine 65+ (1 of 1 - PPSV23) Never done   • INFLUENZA VACCINE  2021   • HEPATITIS C SCREENING  Never done   • ANNUAL WELLNESS VISIT  Never done       <no information>  Last Completed Colonoscopy     This patient has no relevant Health Maintenance data.          There is no immunization history on file for this patient.  Last Completed Mammogram     This patient has no relevant Health Maintenance data.            FAMILY HISTORY:  Family History   Problem Relation Age of Onset   • Cancer Mother    • Cancer Paternal Grandmother      SOCIAL HISTORY:  Social History     Socioeconomic History   • Marital status: Single   Tobacco Use   • Smoking status: Former Smoker     Types: Cigars     Quit date: 2021     Years since quittin.9   • Smokeless tobacco: Never Used   Vaping Use   • Vaping Use: Never used   Substance and Sexual Activity   • Alcohol use: Yes     Comment: OCC   • Drug use: Never   • Sexual activity: Defer       REVIEW OF SYSTEMS:    Review of Systems   Constitutional: Positive for fatigue. Negative for chills and fever.        \"I " "ran out of gas quick. I am tired.\"   HENT: Negative for congestion, nosebleeds, sore throat and trouble swallowing.    Respiratory: Negative for cough, shortness of breath and wheezing.    Cardiovascular: Negative for chest pain and palpitations.   Gastrointestinal: Positive for constipation. Negative for abdominal pain, diarrhea, nausea and vomiting.        \"I use suppository.\"   Endocrine: Negative for polydipsia and polyphagia.   Genitourinary: Negative for difficulty urinating, dysuria and flank pain.   Musculoskeletal: Negative for gait problem and joint swelling.   Skin: Positive for pallor.   Allergic/Immunologic: Negative for food allergies.   Neurological: Negative for dizziness, speech difficulty and weakness.   Hematological: Does not bruise/bleed easily.   Psychiatric/Behavioral: Negative for agitation, confusion and hallucinations.       VITAL SIGNS: /62   Pulse 84   Temp 98.4 °F (36.9 °C)   Resp 18   Ht 172.7 cm (68\")   Wt 67.4 kg (148 lb 8 oz)   SpO2 97%   BMI 22.58 kg/m²   Pain Score    02/22/22 0850   PainSc: 0-No pain       PHYSICAL EXAMINATION:     Physical Exam  Vitals reviewed.   Constitutional:       General: He is not in acute distress.  HENT:      Head: Normocephalic and atraumatic.   Eyes:      General: No scleral icterus.  Cardiovascular:      Rate and Rhythm: Normal rate.   Pulmonary:      Effort: No respiratory distress.      Breath sounds: No wheezing or rales.   Abdominal:      General: Bowel sounds are normal. There is no distension.      Palpations: Abdomen is soft.   Musculoskeletal:         General: No swelling.      Cervical back: Neck supple.   Skin:     General: Skin is warm.      Coloration: Skin is pale.   Neurological:      Mental Status: He is alert and oriented to person, place, and time.   Psychiatric:         Mood and Affect: Mood normal.         Behavior: Behavior normal.         Thought Content: Thought content normal.         Judgment: Judgment normal. "         LABS    Lab Results - Last 18 Months   Lab Units 02/22/22  0801 02/15/22  0802 02/08/22  0812 02/01/22  0811 01/25/22  0805 01/14/22  1115   HEMOGLOBIN g/dL 12.6* 13.4 14.1 15.1 14.7 15.5   HEMATOCRIT % 37.8 42.3 43.2 46.7 46.4 48.9   MCV fL 90.4 91.4 91.3 91.9 91.7 89.6   WBC 10*3/mm3 3.63 5.12 5.90 6.75 7.15 6.58   RDW % 15.3 15.2 15.0 15.2 16.1* 16.2*   MPV fL 10.1 9.5 9.2 9.5 9.5 8.8   PLATELETS 10*3/mm3 59* 120* 176 174 201 216   IMM GRAN % % 0.6* 0.4 0.5 0.9* 0.3 0.5   NEUTROS ABS 10*3/mm3 2.40 3.78 4.21 4.71 4.91 4.44   LYMPHS ABS 10*3/mm3 0.76 0.83 1.10 1.23 1.59 1.63   MONOS ABS 10*3/mm3 0.26 0.33 0.39 0.53 0.49 0.36   EOS ABS 10*3/mm3 0.18 0.14 0.14 0.19 0.11 0.09   BASOS ABS 10*3/mm3 0.01 0.02 0.03 0.03 0.03 0.03   IMMATURE GRANS (ABS) 10*3/mm3 0.02 0.02 0.03 0.06* 0.02 0.03   NRBC /100 WBC 0.0 0.0 0.0 0.0 0.0 0.0       Lab Results - Last 18 Months   Lab Units 02/22/22  0801 02/15/22  0802 02/08/22  0812 02/01/22  0811 01/25/22  0805 01/14/22  1115   GLUCOSE mg/dL 112* 115* 107* 130* 115* 106*   SODIUM mmol/L 140 141 139 140 140 138   POTASSIUM mmol/L 4.3 4.4 4.7 4.3 4.4 4.8   CO2 mmol/L 29.0 31.0* 32.0* 29.0 33.0* 30.0*   CHLORIDE mmol/L 103 102 101 103 102 101   ANION GAP mmol/L 8.0 8.0 6.0 8.0 5.0 7.0   CREATININE mg/dL 0.70* 0.71* 0.67* 0.62* 0.71* 0.69*   BUN mg/dL 12 6* 8 12 12 11   BUN / CREAT RATIO  17.1 8.5 11.9 19.4 16.9 15.9   CALCIUM mg/dL 8.9 9.0 9.0 8.8 8.8 9.3   EGFR IF NONAFRICN AM mL/min/1.73 112 110 118 129 110 114   ALK PHOS U/L 78 81 103  --  73 84   TOTAL PROTEIN g/dL 6.6 6.7 6.7  --  6.8 7.5   ALT (SGPT) U/L 11 11 17  --  8 8   AST (SGOT) U/L 18 18 23  --  12 18   BILIRUBIN mg/dL 0.5 0.4 0.4  --  0.9 1.2   ALBUMIN g/dL 3.80 3.90 3.80  --  3.80 4.20   GLOBULIN gm/dL 2.8 2.8 2.9  --  3.0 3.3       No results for input(s): MSPIKE, KAPPALAMB, IGLFLC, URICACID, FREEKAPPAL, CEA, LDH, REFLABREPO in the last 68641 hours.    No results for input(s): IRON, TIBC, LABIRON, FERRITIN,  "N3VMTYT, TSH, FOLATE in the last 45507 hours.    Invalid input(s): VITB12    Enrique Coronado reports a pain score of 0.          ASSESSMENT:  1.  Squamous cell carcinoma of the epiglottis.  AJCC stage:IVB(cT4b, cN2c, cM0)  Treatment status: On weekly cisplatin 01/25/2022 with radiation.  2.  Performance status of 1.  3.  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 \"I can't taste nothing.  My taste is gone.\"  4.  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  5.  Grade 2 thrombocytopenia from chemo.   6.  Normocytic anemia from chemo.         PLAN:   1.   Re: Tolerance to weekly cisplatin with radiation.  \"Fine. No nausea or vomiting, just tired.  Dr. Bird saw me this morning. Looking good.\"  2.   Re:  Heme status.  Hemoglobin 12.6 and platelet 59.   3.   Re:  CMP.   mL/min  4.   Re:  Magnesium at 2.  5.  Schedule weekly chemo and radiation: Monitor for potential adverse effects of chemotherapy like infusion reactions, nausea, vomiting, neuropathy, ototoxicity, renal toxicity, alopecia, worsening thrombocytopenia, and fatigue.  Cisplatin 40 mg/m² weekly if platelet at least 100 and ANC at least 1.5.  6.  Pre-hydrate 1 L NS with 10 mEq KCl and 1 g magnesium.  Lasix 20 mg IV push after prehydration.  7.  Post hydrate with 1 L NS with 10 mEq KCl and 1 g magnesium.  Lasix 20 mg IV push after post hydration.  8.  Premed:  Aloxi 0.25 mg.  Emend 150 mg IV  Decadron 12 mg IV.  9.  Weekly CBC with differential, CMP and magnesium.  10.  eRx Zofran 8 mg p.o. every 8 hours as needed for nausea and vomiting #60 with 2 refills if needed  11.  eRx Compazine 10 mg p.o. every 4 hours as needed for nausea and vomiting #60 with 2 refills if needed.  12.  eRx Zyprexa 5 mg po daily for 4 days, start first day of chemo, # 4, 3 refills if needed.   13.  Continue care per primary care physician and other specialists.  14.  Plan of care discussed with patient.  Understand expressed.  Patient " agreeable to proceed.  15.  Advance Care Planning   ACP discussion was held with the patient during this visit. Patient has an advance directive (not in EMR), copy requested.  16.  Ferritin, iron panel, retic, B12 and folate today for anemia.   17.  Return to office in 4 weeks.       I have reviewed the assessment and plan and verified the accuracy of it. No changes to assessment and plan since the information was documented. Brad Rubin MD 02/22/22        I spent 32 total minutes, face-to-face, caring for Enrique today.  Greater than 50% of this time involved counseling and/or coordination of care as documented within this note regarding the patient's illness(es), pros and cons of various treatment options, instructions and/or risk reduction.             (Damian Bird MD)  (Robert Ayers MD)  Odessa Graham MD

## 2022-02-22 NOTE — PROGRESS NOTES
Adult Outpatient Nutrition  Assessment/PES    Patient Name:  Enrique Coronado  YOB: 1950  MRN: [id number]    Assessment Date:  2/22/2022       General Info     Row Name        Today's Session    Person(s) attending today's session Other (comment)  Pt checked in this for Chemo/XRT & Nutr. Had XRT trtmnt, hoWever was sent home from chemo d/t counts being too low to have treatment, RD was unaware to Pt being sent home early & didnt get to see Pt today prior to him leaving facility. Will f/u next week                      Electronically signed by:  Rosario Martinez RDN, LD  02/22/22 14:31 CST

## 2022-02-24 NOTE — TELEPHONE ENCOUNTER
Returned patient call regarding constipation. States that he had not had a BM since 02/21/2022. States that he had increased his water intake and tried glycerin suppositories. Asking for recommendations. Explained that he could try miralax, docusate, mag citrate, and possibly an enema. Explained to patient that we would have a handout for him when he came in for XRT 02/25/2022. He verbalized understanding.

## 2022-03-01 NOTE — PROGRESS NOTES
08 Meron MASSEY called to report doctor wants to hold treatment with low platelets. Patient informed. Meli Key RN

## 2022-03-01 NOTE — TELEPHONE ENCOUNTER
Notified [facility] to hold Enrique Coronado txt today due to low PLT count. Patient nydia for next week and d/c home

## 2022-03-08 NOTE — PROGRESS NOTES
Message sent to Nohelia Rabago with Dr. Rubin regarding ANC of 0.92.  Hold chemo today and will repeat labs and proceed with chemo if labs are stable in 1 week.  Pt v/shazia Rachel RN

## 2022-03-08 NOTE — TELEPHONE ENCOUNTER
CRITICAL LAB VALUE  Received call from [facility] with CRITICAL LAB VALUE    WBC: 1.81  This information was sent to Dr Rubin for review

## 2022-03-08 NOTE — PROGRESS NOTES
Adult Outpatient Nutrition  Assessment/PES    Patient Name:  Enrique Coronado  YOB: 1950  MRN: [id number]    Assessment Date:  3/8/2022         General Info     Row Name 03/08/22 1111       Today's Session    Person(s) attending today's session Patient  consent via telephone       General Information    Preferred Language English    Oncology patient? yes  squamous cell of the epiglottis       Oncology Specific Assessment    Type of treatment Chemotherapy;Radiation    Frequency of treatment Pt's chemo has been on hold for several weeks, Pt did not receive radiation today d/t blood counts.    Goal of treatment Currative    Reported symptoms Taste changes;Other (comment)  states everything taste like cardboard, throat sems to be tight but states he is still doing swallowing exercises. no nausea/upset stomach. appetite is down                 Anthropometrics     Row Name 03/08/22 1219          Usual Body Weight (UBW)    Usual Body Weight 72.6 kg (160 lb)  reported -224 lbs in April/May 2021, wt taken at XRT apt on 1/17/22 160 lbs. Pt states lowest was 141 lbs in Dec 2021     Weight Change Time Frame Todays wt 147 lbs; net loss 13 lbs (8%) in 7 weeks                  Home Nutrition Report     Row Name 03/08/22 1224          Home Nutrition Report    Diet Vegetarian     Typical Intake (Food/Fluid/EN/PN) 24 hrs recall: x3 Protein shakes, is now using protein shakes with increase kcal and protein. Cereal, ckn noodle soup for lunch today. and 2-3 glasses of milk. 2-4 glasses of water                Estimated/Assessed Needs - Anthropometrics     Row Name 03/08/22 1219          Usual Body Weight (UBW)    Usual Body Weight 72.6 kg (160 lb)  reported -224 lbs in April/May 2021, wt taken at XRT apt on 1/17/22 160 lbs. Pt states lowest was 141 lbs in Dec 2021     Weight Change Time Frame Todays wt 147 lbs; net loss 13 lbs (8%) in 7 weeks                       Problem/Interventions:   Problem 1     Row  Name 03/08/22 1226          Nutrition Diagnoses Problem 1    Problem 1 Increased Nutrient Needs     Macronutrient Kcal;Protein;Fluid     Etiology (related to) Medical Diagnosis     Oncology Head/neck cancer     Signs/Symptoms (evidenced by) Unintended Weight Change     Unintended Weight Change Loss     Number of Pounds Lost Todays wt 147 lbs; net loss 13 lbs (8%) in 7 weeks                        Intervention Goal     Row Name 03/08/22 1226          Intervention Goal    General Meet nutritional needs for age/condition;Disease management/therapy     PO Increase intake;Meet estimated needs     Weight Maintain weight                    Education/Evaluation     Row Name 03/08/22 1227          Monitor/Evaluation    Monitor Per protocol                 Electronically signed by:  Rosario Martinez RDN, LD  03/08/22 12:27 CST

## 2022-03-09 NOTE — TELEPHONE ENCOUNTER
----- Message from Brad Rubin MD sent at 3/9/2022 10:09 AM CST -----  Notify patient.  ANC improving to 1.3 from 0.92.  Continue observation.  Order CBC with differential next week.    Called patient with the above information. Left him a detailed voicemail and our call back if he has any questions. Patient is already scheduled next week for his CBC lab.    [place]

## 2022-03-15 NOTE — THERAPY TREATMENT NOTE
Outpatient Speech Language Pathology   Adult Swallow Treatment Note       Patient Name: Enrique Coronado  : 1950  MRN: [id number]  Today's Date: 3/15/2022         Visit Date: 03/15/2022   Patient Active Problem List   Diagnosis   • Squamous cell cancer of epiglottis (HCC)   • Mass of left side of neck   • Former smoker        Visit Dx:    ICD-10-CM ICD-9-CM   1. Dysphagia, unspecified type  R13.10 787.20        OP SLP Assessment/Plan - 03/15/22 1625        SLP Assessment    Clinical Impression Comments Progressing as expected.  -CS       SLP Plan    Plan Comments Continue to follow and treat.  -CS          User Key  (r) = Recorded By, (t) = Taken By, (c) = Cosigned By    Initials Name Provider Type    CS Esteban Chapman MS CCC-SLP Speech and Language Pathologist                                   SLP OP Goals     Row Name 03/15/22 1205          Goal Type Needed    Goal Type Needed Dysphagia  -CS            Subjective Pain    Able to rate subjective pain? yes  -CS     Pre-Treatment Pain Level 1  -CS     Post-Treatment Pain Level 1  -CS            Dysphagia Goals    Dysphagia LTG's Patient will safely consume the recommended diet without complications such as aspiration pneumonia  -CS     Patient will safely consume the recommended diet without complications such as aspiration pneumonia Will consume recommended diet safely without complications.  -CS     Status: Patient will safely consume the recommended diet without complications such as aspiration pneumonia Progressing as expected  -CS     Comments: Patient will safely consume the recommended diet without complications such as aspiration pneumonia Continues a regular diet with thin liquids. Improved toleration today of thin liquids. No overt s/s of aspiration observed. Reports loss of taste and only mild discomfort.  -CS            Other Goals    Other Adult Goal- 1 Patient will complete swallowing exercises to maintain current level of swallow function during  and following radiation treatment.  -CS     Status: Other Adult Goal- 1 Progressing as expected  -CS     Comments: Other Adult Goal- 1 Educated regarding importance of continued swallowing exercise. Patient reports he has been completing daily when he thinks about it. He has written exercise guide at home per his report.  -CS     Other Adult Goal- 2 Patient will maintain weight and nutrition PO.  -CS     Status: Other Adult Goal- 2 Progressing as expected  -CS     Comments: Other Adult Goal- 2 Pt's weight is maintained at this time.  -CS     Other Adult Goal- 3 Patient will continue to be educated on effects of radiation on swallow function.  -CS     Status: Other Adult Goal- 3 Progressing as expected  -CS     Comments: Other Adult Goal- 3 Pt was noted to have decreased laryngeal strength and 2x instance of coughing with thin liquids on this date. Pt eductaed on s/s and risk factors of aspiration but he insisted he was ok. SLP educated on potential benefit of liquid thickener. SLP will continue to follow.  -CS            SLP Time Calculation    SLP Goal Re-Cert Due Date 05/01/22  -CS           User Key  (r) = Recorded By, (t) = Taken By, (c) = Cosigned By    Initials Name Provider Type    Esteban Oh MS CCC-SLP Speech and Language Pathologist               OP SLP Education     Row Name 03/15/22 8067       Education    Barriers to Learning No barriers identified  -CS    Education Provided Patient requires further education on strategies, risks  -CS    Assessed Learning needs;Learning motivation;Learning preferences;Learning readiness  -CS    Learning Motivation Strong  -CS    Learning Method Explanation  -CS    Teaching Response Verbalized understanding  -CS          User Key  (r) = Recorded By, (t) = Taken By, (c) = Cosigned By    Initials Name Effective Dates    Esteban Oh MS CCC-SLP 06/16/21 -                     Time Calculation:   SLP Start Time: 1205  SLP Stop Time: 1240  SLP Time Calculation  (min): 35 min  Untimed Charges  [id number] Treatment Swallow Minutes: 35  Total Minutes  Untimed Charges Total Minutes: 35   Total Minutes: 35                 Esteban Chapman, MS CCC-SLP  3/15/2022

## 2022-03-22 NOTE — PROGRESS NOTES
[facility] HEMATOLOGY & ONCOLOGY  [address]  [place] [address] [phone]    Patient Name: Enrique Coronado  Encounter Date: 03/22/2022  YOB: 1950  Patient Number: [id number]      REASON FOR FOLLOW-UP: Enrique Coronado is a pleasant 71 y.o.  male who is seen on follow-up for squamous cell carcinoma, epiglottis.  He is seen week 6 of cisplatin with radiation.  He had treatment delays due to thrombocytopenia and neutropenia. He is seen alone. History is obtained from patient. History is considered to be accurate.       DIAGNOSTIC ABNORMALITIES:  He presented with left-sided neck mass 04/2021.  Patient had progressive symptoms with sinus drainage and unable to eat.  He has lost 60 pounds since 05/2021.  He was seen by Dr. Robert Ayers on 12/17/2021.  Examination showed a 3 cm firm immobile node, level 3, left neck.  Laryngoscopy showed pooling, erythema, questionable lesion in the inferior vallecula adjacent to the epiglottis.  Lingual tonsil hypertrophy.  Granulated lesion involving the lingual and laryngeal surface thickening of the epiglottis standing down the epiglottis into the petiole area and on to the anterior false cords.  Reduced mobility through vocal cords.  Biopsy x2 left neck mass.  Pathology report 12/20/2021 left neck fine-needle aspiration: Positive for malignancy metastatic squamous cell carcinoma.  CBC 12/20/2021 revealed a WBC of 8.1, hemoglobin 15.4, and platelets 194.  BMP remarkable for CO2 of 30.  He had undergone direct laryngoscopy, esophagoscopy, bronchoscopy, and [facility] laryngoscopy 12/27/2021.  Base of tongue mass noted in the vallecula that is quite firm, extending onto the lingual surface of the epiglottis, right tonsillar hypertrophy.  Supraglottic/epiglottic entirely consumed with firm tumor, both lingual and laryngeal surfaces, extending to the area epiglottic folds midway;  arytenoids appear intact.,  Hypertrophied.  Glottis false vocal cords with anterior involvement of the tumor extending down to the petiole and out onto the false vocal cords; true vocal cords appear unaffected by this, with normal mucosal surface of the subglottic neck, no tumor or scarring.  Follow-up with Dr. Robert Ayers on 01/06/2022.  Patient diagnosed with epiglottic cancer.  The tumor extended down to the glottic proper on the false vocal folds.  AJCC stage(cT4b, cN2b, cM0).  PET was ordered.  Patient referred to medical and radiation oncology.  Follow-up 02/17/2022.  PET scan 01/12/2022.   Hypermetabolic glottic and supraglottic mass is consistent with primary laryngeal malignancy. Hypermetabolic bilateral level 2 and 3 lymph nodes (5 total, 3 on the left and 2 on the right) as described above are consistent with  metastatic disease.        PREVIOUS INTERVENTIONS:  Cisplatin weekly, 01/25/2022, 02/01/2022, 02/08/2022, 02/15/2022, 03/15/2022 and 03/22/2022 with radiation.   Cisplatin was canceled on 02/22/2022 due to platelet of 59, 03/01/2022 due to platelet of 73 and on 03/08/2022 due to ANC of 0.92.      Oncology/Hematology History   Squamous cell cancer of epiglottis (HCC)    Initial Diagnosis    Squamous cell cancer of epiglottis (HCC)     12/15/2021 Biopsy    Final Diagnosis   Lymph node, left neck, fine-needle aspiration:  Positive for malignancy.  Metastatic squamous cell carcinoma.        12/27/2021 Biopsy    Final Diagnosis   1.  Epiglottis, biopsy:  A.  Keratinizing squamous cell carcinoma, invasive.  B.  Maximum depth of invasion is 1.4 mm.  C.  No perineural invasion identified.  D.  p16 negative.    2.  Base of tongue, biopsy:  A.  Low-grade dysplasia of squamous mucosa.  B.  Reactive lymphoid hyperplasia in the subepithelial stroma.  C.  No histologic evidence of malignancy.    D.  Left false vocal cord, biopsy:  A.  Keratinizing squamous cell carcinoma, invasive.  B.  Tumor invades to the  depth of the biopsy (depth of invasion at least 1.91 mm).  C.  No perineural invasion identified.  D.  p16 negative.    4.  Right false vocal cord, biopsy:  A.  Keratinizing squamous cell carcinoma, invasive.  B.  Tumor invades to the depth of the biopsy (depth of invasion at least 2.44 mm).  C.  No perineural invasion identified.  E.  p16 negative.    AJCC stage: pTX pNX        1/12/2022 Imaging    PET/CT:  IMPRESSION:  1.  Hypermetabolic glottic and supraglottic mass is consistent with  primary laryngeal malignancy.  2.  Hypermetabolic bilateral level 2 and 3 lymph nodes (5 total, 3 on  the left and 2 on the right) as described above are consistent with  metastatic disease.     1/25/2022 -  Chemotherapy    OP HEAD & NECK CISplatin (Weekly) + XRT     2/2/2022 Cancer Staged    Staging form: Cutaneous Carcinoma Of The Head And Neck, AJCC 8th Edition  - Pathologic stage from 2/2/2022: Stage IV (pT4b, pN2c, cM0) - Signed by Brad Rubin MD on 2/2/2022         PAST MEDICAL HISTORY:  ALLERGIES:  No Known Allergies  CURRENT MEDICATIONS:  Outpatient Encounter Medications as of 3/22/2022   Medication Sig Dispense Refill   • fluticasone (FLONASE) 50 MCG/ACT nasal spray 2 sprays into the nostril(s) as directed by provider Daily.     • ipratropium (ATROVENT) 0.06 % nasal spray 2 sprays into the nostril(s) as directed by provider 4 (Four) Times a Day As Needed for Rhinitis. For drainage 45 mL 3   • Lidocaine Viscous HCl (XYLOCAINE) 2 % solution Take 5 mL by mouth 4 (Four) Times a Day As Needed for Moderate Pain . 100 mL 2   • OLANZapine (zyPREXA) 5 MG tablet 1 tablet po daily for 4 days when he starts chemo. 30 tablet 1   • ondansetron (Zofran) 8 MG tablet Take 1 tablet by mouth Every 8 (Eight) Hours As Needed for Nausea or Vomiting. 90 tablet 2   • POTASSIUM CHLORIDE PO Take  by mouth Every Other Day.     • prochlorperazine (COMPAZINE) 10 MG tablet Take 1 tablet by mouth Every 4 (Four) Hours As Needed for Nausea or Vomiting.  90 tablet 2   • cetirizine (zyrTEC) 10 MG tablet Take 10 mg by mouth Daily.       Facility-Administered Encounter Medications as of 3/22/2022   Medication Dose Route Frequency Provider Last Rate Last Admin   • [COMPLETED] CISplatin (PLATINOL) 76 mg in sodium chloride 0.9 % 326 mL chemo IVPB  40 mg/m2 (Treatment Plan Recorded) Intravenous Once Brad Rubin MD   Stopped at 03/22/22 1302   • [COMPLETED] dexamethasone (DECADRON) IVPB 12 mg  12 mg Intravenous Once Brad Rubin MD   Stopped at 03/22/22 1104   • [COMPLETED] fosaprepitant (EMEND) 150 mg/100mL NS  150 mg Intravenous Once Brad Rubin MD   Stopped at 03/22/22 1134   • [COMPLETED] furosemide (LASIX) injection 20 mg  20 mg Intravenous Once Brad Rubin MD   20 mg at 03/22/22 1359   • [COMPLETED] furosemide (LASIX) injection 20 mg  20 mg Intravenous Once Brad Rubin MD   20 mg at 03/22/22 1047   • [COMPLETED] magnesium sulfate 500 mg, potassium chloride 10 mEq in sodium chloride 0.9 % 500 mL IVPB   Intravenous Once Brad Rubin MD   Stopped at 03/22/22 1045   • [COMPLETED] magnesium sulfate 500 mg, potassium chloride 10 mEq in sodium chloride 0.9 % 500 mL IVPB   Intravenous Once Brad Rubin MD   Stopped at 03/22/22 1358   • [COMPLETED] palonosetron (ALOXI) injection 0.25 mg  0.25 mg Intravenous Once Brad Rubin MD   0.25 mg at 03/22/22 1045   • [COMPLETED] sodium chloride 0.9 % infusion 250 mL  250 mL Intravenous Once Brad Rubin MD   Stopped at 03/22/22 1401   • [DISCONTINUED] diphenhydrAMINE (BENADRYL) injection 50 mg  50 mg Intravenous PRN Brad Rubin MD       • [DISCONTINUED] famotidine (PEPCID) injection 20 mg  20 mg Intravenous PRN Brad Rubin MD       • [DISCONTINUED] hydrocortisone sodium succinate (Solu-CORTEF) injection 100 mg  100 mg Intravenous PRN Brad Rubin MD       • [DISCONTINUED] OLANZapine (zyPREXA) tablet 5 mg  5 mg Oral Once Brad Rubin MD         ADULT ILLNESSES:  Patient Active Problem List   Diagnosis  "Code   • Squamous cell cancer of epiglottis (HCC) C32.1   • Mass of left side of neck R22.1   • Former smoker Z87.891     SURGERIES:  Past Surgical History:   Procedure Laterality Date   • LARYNGOSCOPY Bilateral 2021    Procedure: MICRODIRECT LARYNGOSCOPY WITH/WITHOUT LASER;  Surgeon: Robert Ayers Jr., MD;  Location: [facility] OR;  Service: ENT;  Laterality: Bilateral;   • PANENDOSCOPY Bilateral 2021    Procedure: DIRECT LARYNGOSCOPY, ESOPHAGOSCOPY, BRONCHOSCOPY;  Surgeon: Robert Ayers Jr., MD;  Location: [facility] OR;  Service: ENT;  Laterality: Bilateral;   • TEETH EXTRACTION     • TONSILLECTOMY       HEALTH MAINTENANCE ITEMS:  Health Maintenance Due   Topic Date Due   • COLORECTAL CANCER SCREENING  Never done   • COVID-19 Vaccine (1) Never done   • TDAP/TD VACCINES (1 - Tdap) Never done   • ZOSTER VACCINE (1 of 2) Never done   • Pneumococcal Vaccine 65+ (1 of 1 - PPSV23) Never done   • INFLUENZA VACCINE  2021   • HEPATITIS C SCREENING  Never done   • ANNUAL WELLNESS VISIT  Never done       <no information>  Last Completed Colonoscopy     This patient has no relevant Health Maintenance data.          There is no immunization history on file for this patient.  Last Completed Mammogram     This patient has no relevant Health Maintenance data.            FAMILY HISTORY:  Family History   Problem Relation Age of Onset   • Cancer Mother    • Cancer Paternal Grandmother      SOCIAL HISTORY:  Social History     Socioeconomic History   • Marital status: Single   Tobacco Use   • Smoking status: Former Smoker     Types: Cigars     Quit date: 2021     Years since quittin.0   • Smokeless tobacco: Never Used   Vaping Use   • Vaping Use: Never used   Substance and Sexual Activity   • Alcohol use: Yes     Comment: OCC   • Drug use: Never   • Sexual activity: Defer       REVIEW OF SYSTEMS:    Review of Systems   Constitutional: Positive for fatigue. Negative for chills and fever.        \"I " "feel fine. Just soreness in my throat.\"   HENT: Negative for congestion, nosebleeds and trouble swallowing.    Eyes: Negative for redness and visual disturbance.   Respiratory: Negative for cough, shortness of breath and wheezing.    Cardiovascular: Negative for chest pain and palpitations.   Gastrointestinal: Negative for abdominal pain, nausea and vomiting.   Endocrine: Negative for polydipsia and polyphagia.   Genitourinary: Negative for difficulty urinating, dysuria and flank pain.   Musculoskeletal: Negative for gait problem and joint swelling.   Skin: Positive for pallor.   Allergic/Immunologic: Negative for food allergies.   Neurological: Negative for dizziness, speech difficulty and weakness.   Hematological: Negative for adenopathy. Does not bruise/bleed easily.   Psychiatric/Behavioral: Negative for agitation, confusion and hallucinations.       VITAL SIGNS: /60   Pulse 77   Temp 98.4 °F (36.9 °C)   Resp 18   Ht 177.8 cm (70\")   Wt 68.3 kg (150 lb 8 oz)   SpO2 95%   BMI 21.59 kg/m²   Pain Score    03/22/22 1449   PainSc: 0-No pain       PHYSICAL EXAMINATION:     Physical Exam  Vitals reviewed.   Constitutional:       General: He is not in acute distress.  HENT:      Head: Normocephalic and atraumatic.   Eyes:      General: No scleral icterus.  Cardiovascular:      Rate and Rhythm: Normal rate.   Pulmonary:      Breath sounds: No wheezing or rales.   Abdominal:      General: Abdomen is flat. Bowel sounds are normal.      Palpations: Abdomen is soft.      Tenderness: There is no abdominal tenderness.   Musculoskeletal:         General: No swelling.      Cervical back: Neck supple.   Skin:     General: Skin is warm.      Coloration: Skin is pale.   Neurological:      Mental Status: He is alert and oriented to person, place, and time.   Psychiatric:         Mood and Affect: Mood normal.         Behavior: Behavior normal.         Thought Content: Thought content normal.         Judgment: Judgment " normal.         LABS    Lab Results - Last 18 Months   Lab Units 03/22/22  0801 03/15/22  0757 03/09/22  0951 03/08/22  0757 03/01/22  0804 02/22/22  0801   HEMOGLOBIN g/dL 12.3* 12.0* 11.8* 11.9* 12.0* 12.6*   HEMATOCRIT % 38.1 36.4* 35.8* 36.4* 36.7* 37.8   MCV fL 97.7* 96.6 95.5 95.3 93.9 90.4   WBC 10*3/mm3 5.52 4.59 2.34* 1.87* 2.39* 3.63   RDW % 19.2* 19.9* 19.0* 18.6* 16.6* 15.3   MPV fL 9.6 9.6 9.1 9.2 9.7 10.1   PLATELETS 10*3/mm3 287 312 180 175 73* 59*   IMM GRAN % % 3.4* 0.7* 0.4 1.1* 0.8* 0.6*   NEUTROS ABS 10*3/mm3 3.78 2.90 1.32* 0.92* 1.56* 2.40   LYMPHS ABS 10*3/mm3 0.81 0.73 0.57* 0.54* 0.52* 0.76   MONOS ABS 10*3/mm3 0.62 0.83 0.34 0.25 0.17 0.26   EOS ABS 10*3/mm3 0.08 0.07 0.09 0.14 0.11 0.18   BASOS ABS 10*3/mm3 0.04 0.03 0.01 0.00 0.01 0.01   IMMATURE GRANS (ABS) 10*3/mm3 0.19* 0.03 0.01 0.02 0.02 0.02   NRBC /100 WBC 0.0 0.0 0.0 0.0 0.0 0.0       Lab Results - Last 18 Months   Lab Units 03/22/22  0801 03/15/22  0757 03/08/22  0757 03/01/22  0804 02/22/22  0801 02/15/22  0802 02/08/22  0812 02/01/22  0811 01/25/22  0805 01/14/22  1115   GLUCOSE mg/dL 121*  121* 108* 128* 112* 112* 115* 107* 130* 115* 106*   SODIUM mmol/L 141  141 140 141 140 140 141 139 140 140 138   POTASSIUM mmol/L 4.5  4.5 4.4 4.4 4.4 4.3 4.4 4.7 4.3 4.4 4.8   CO2 mmol/L 30.0*  30.0* 28.0 29.0 29.0 29.0 31.0* 32.0* 29.0 33.0* 30.0*   CHLORIDE mmol/L 103  103 103 105 104 103 102 101 103 102 101   ANION GAP mmol/L 8.0  8.0 9.0 7.0 7.0 8.0 8.0 6.0 8.0 5.0 7.0   CREATININE mg/dL 0.70*  0.70* 0.68* 0.64* 0.71* 0.70* 0.71* 0.67* 0.62* 0.71* 0.69*   BUN mg/dL 12  12 10 14 13 12 6* 8 12 12 11   BUN / CREAT RATIO  17.1  17.1 14.7 21.9 18.3 17.1 8.5 11.9 19.4 16.9 15.9   CALCIUM mg/dL 9.4  9.4 9.3 9.5 9.4 8.9 9.0 9.0 8.8 8.8 9.3   EGFR IF NONAFRICN AM mL/min/1.73  --   --   --   --  112 110 118 129 110 114   ALK PHOS U/L 82 70 64 74 78 81 103  --  73 84   TOTAL PROTEIN g/dL 6.5 6.9 6.6 6.7 6.6 6.7 6.7  --  6.8 7.5   ALT  "(SGPT) U/L 15 14 10 9 11 11 17  --  8 8   AST (SGOT) U/L 22 13 18 15 18 18 23  --  12 18   BILIRUBIN mg/dL 0.3 0.7 0.4 0.5 0.5 0.4 0.4  --  0.9 1.2   ALBUMIN g/dL 3.80 3.70 3.80 4.00 3.80 3.90 3.80  --  3.80 4.20   GLOBULIN gm/dL 2.7 3.2 2.8 2.7 2.8 2.8 2.9  --  3.0 3.3       No results for input(s): MSPIKE, KAPPALAMB, IGLFLC, URICACID, FREEKAPPAL, CEA, LDH, REFLABREPO in the last 03355 hours.    Lab Results - Last 18 Months   Lab Units 02/22/22  0801   IRON mcg/dL 98   TIBC mcg/dL 256*   IRON SATURATION % 38   FERRITIN ng/mL 481.00*   FOLATE ng/mL 9.19       Enrique Coronado reports a pain score of 0.        ASSESSMENT:  1.  Squamous cell carcinoma of the epiglottis.  AJCC stage:IVB(cT4b, cN2c, cM0)  Treatment status: Post weekly cisplatin 01/25/2022, 02/01/2022, 02/08/2022, 02/15/2022, 03/15/2022 and 03/22/2022 with radiation.  2.  Performance status of 1.  3.  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 \"I can't taste for months. I have been that.\"  4.  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 \"I quit smoking.\"  5.  Grade 2 thrombocytopenia from chemo on 02/22/2022 and 03/01/2022.   6.  Grade 3 neutropenia without fever from chemo on 03/08/2022.  7.  Normocytic anemia from chemo and chronic disease.           PLAN:   1.   Re:  Weekly cisplatin cancellations due to thrombocytopenia/neutropenia with radiation.  He had completed chemoradiation 03/22/2022.   2.   Re:  Heme status.  Hemoglobin 12.3, ANC 3.78, and platelet 287.   3.   Re:  CMP.  GFR 99.7 mL/min  4.   Re:  Magnesium at 2.3.  5.   Re:  Iron saturation 38%, ferritin 481, B12 401, and folate 9.19 on 02/22/2022.  6.   Re:  Stable for observation, epiglottic cancer.  7.   Re:  Reassessment 4 to 8 weeks from completion of chemoradiation.  CT soft tissue neck in 12 weeks.  8.  CBC with differential in 4 weeks.  9.   To see Dr. Bird 03/22/2022.    10.  eRx Zofran 8 mg p.o. every 8 hours as " needed for nausea and vomiting #60 with 2 refills if needed  11.  eRx Compazine 10 mg p.o. every 4 hours as needed for nausea and vomiting #60 with 2 refills if needed.  12.  Continue care per primary care physician and other specialists.  13.  Plan of care discussed with patient.  Understand expressed.  Patient agreeable to proceed.  14.  Advance Care Planning   ACP discussion was held with the patient during this visit. Patient has an advance directive (not in EMR), copy requested.  15.  Return to office in 3 months with preoffice CBC with differential, CMP and CT soft tissue neck.       I have reviewed the assessment and plan and verified the accuracy of it. No changes to assessment and plan since the information was documented. Brad Rubin MD 03/22/22        I spent 33 total minutes, face-to-face, caring for Enrique today.  Greater than 50% of this time involved counseling and/or coordination of care as documented within this note regarding the patient's illness(es), pros and cons of various treatment options, instructions and/or risk reduction.              (Damian Bird MD)  (Robert Ayers MD)  Odessa Graham MD

## 2022-03-22 NOTE — PROGRESS NOTES
0816 sent a message to the office regarding a bmp is entered and not a cmp. Return message stating a cmp is needed, and I entered it.

## 2022-05-08 PROBLEM — Z92.3 HISTORY OF RADIATION THERAPY: Status: ACTIVE | Noted: 2022-01-01

## 2022-06-09 NOTE — PROGRESS NOTES
[facility], .SKelsieKelsie Bird MD      Kieran Franks, APRN  ____________________________________________________________  [facility]  Department of Radiation Oncology  [address] [place] [id number]  Office:  [phone]  Fax: [phone]    DATE:  06/10/2022  PATIENT: Enrique Coronado  1950                         MEDICAL RECORD #:  [id number]                                                       REASON FOR VISIT:   No chief complaint on file.    Enrique Coronado is a very pleasant 72 y.o. patient that has completed radiation therapy to the epiglottis and returns to the clinic today for inital follow up exam. Reports appetite change, fatigue, unexpected weight change, ( down 23 pounds since March, 2022), ear pain, sinus pain, sore throat, tinnitus, trouble swallowing, voice change, cough, SOB, nausea, light-headedness, and headaches. Tastes has returned 20-25%. Denies visual disturbance, vomiting, diarrhea, dizziness, and weakness. He follows  and .     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 He completed 7000 cGy in 35 fractions to the epiglottis on 03/22/2022.    12/15/2021 - Appointment with :  • Enrique Coronado is a 71 y.o. male with neck mass. He was referred. He has had sinus since 4/2021. He says he had drainage. He was unable to eat.  • He has lost 60 lbs. He noted since May.  • He says his nose bled as well.  • He is unaware of neck mass.  • He has had CT at [facility].  Recommendations:  • Patient has an epiglottic lesion causing pooling and aspiration.  I fear this is cancer.  I discussed my concerns with the patient.  I feel like patient needs a biopsy and panendoscopy.    • I have discussed risk, benefits, alternative treatments, options.    • The patient appears to understand and wishes to proceed.  • Plan endoscopy   • Patient  understands plan as described and wishes to proceed.  • Return RTC 2 weeks after surgery, for Recheck throat.      12/15/2021 - Lymph node, left neck, fine-needle aspiration per :  • Positive for malignancy.  • Metastatic squamous cell carcinoma.    12/27/2021 - DIRECT LARYNGOSCOPY, ESOPHAGOSCOPY, BRONCHOSCOPY, MICRODIRECT LARYNGOSCOPY WITH/WITHOUT LASER per :  • Epiglottis, biopsy:  o Keratinizing squamous cell carcinoma, invasive.  o Maximum depth of invasion is 1.4 mm.  o No perineural invasion identified.  o p16 negative.  • Base of tongue, biopsy:  o Low-grade dysplasia of squamous mucosa.  o Reactive lymphoid hyperplasia in the subepithelial stroma.  o No histologic evidence of malignancy.  • Left false vocal cord, biopsy:  o Keratinizing squamous cell carcinoma, invasive.  o Tumor invades to the depth of the biopsy (depth of invasion at least 1.91 mm).  o No perineural invasion identified.  o p16 negative.  • Right false vocal cord, biopsy:  o Keratinizing squamous cell carcinoma, invasive.  o Tumor invades to the depth of the biopsy (depth of invasion at least 2.44 mm).  o No perineural invasion identified.  o p16 negative.  • AJCC stage: pTX pNX    01/06/2022 - Appointment with :  • Patient has definitive diagnosis of cancer.  He has stage IV cancer of the epiglottis because it extends down into the true glottic area.  He also has left neck adenopathy.  I will get a PET scan and refer to radiation oncology, medical oncology.  I have discussed therapeutic options with the patient.  After he has seen consultants, he will decide on definitive therapy.  Surgical consideration would have to include laryngectomy given the extent of his tumor into the larynx.  • All care for the cancer patient  • Increase p.o. intake for nutrition  • Referral to radiation oncology and medical oncology   • Return in about 6 weeks (around 2/17/2022) for Recheck Larynx, possible flex  scope.    01/12/2022 - PET Scan:  • There is a soft tissue mass involving the supraglottic larynx, right greater than left with effacement of the preglottic fat, involvement of the epiglottis, effacement of the right piriform sinus and some involvement of the true vocal cord which is asymmetrically deviated medially. This mass shows maximum SUV of 9.2.   • There are 2 adjacent left side level 2 hypermetabolic metastatic lymph nodes, with maximum SUV of 7.8 and 4.3.  • There is a right level 2 lymph node showing maximum SUV of 4.5.   • A right level 2-3 lymph node showing maximum SUV of 6.6   • Left level 3 lymph node showing maximum SUV of 4.3.  Impression:  • Hypermetabolic glottic and supraglottic mass is consistent with primary laryngeal malignancy.  • Hypermetabolic bilateral level 2 and 3 lymph nodes (5 total, 3 on the left and 2 on the right) as described above are consistent with metastatic disease.    01/14/2022 - Consult with :  ASSESSMENT:  • Squamous cell carcinoma of the epiglottis.  o AJCC stage:IVB(cT4b, cN2c, cM0)  o Treatment status: Pending.  • Performance status of 0.  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  PLAN:   •  regarding the role for concurrent weekly cisplatin with radiation to minimize toxicity.  •  regarding potential adverse effects of chemotherapy especially infusion reactions, nausea, vomiting, cytopenias, neuropathy, ototoxicity, renal toxicity, alopecia and fatigue.  • Blood for CBC with differential, CMP and magnesium.  • Refer to Dr. Bird for concurrent chemo radiation.  • Schedule weekly chemo and radiation:  o Cisplatin 40 mg/m² D1.  • Pre-hydrate 1 L NS with 10 mEq KCl and 1 g magnesium.  Lasix 20 mg IV push after prehydration.  • Post hydrate with 1 L NS with 10 mEq KCl and 1 g magnesium.  Lasix 20 mg IV push after post hydration.  • Premed:  o Aloxi 0.25 mg.  o Emend 150 mg  IV  o Decadron 12 mg IV.  • Weekly CBC with differential, CMP and magnesium once he starts chemo.  • eRx Zofran 8 mg p.o. every 8 hours as needed for nausea and vomiting #60 with 2 refills.  • eRx Compazine 10 mg p.o. every 4 hours as needed for nausea and vomiting #60 with 2 refills.  • eRx Zyprexa 5 mg po daily for 4 days, start first day of chemo, # 4, 3 refills.   • Continue care per primary care physician and other specialists.  • Plan of care discussed with patient.  Understand expressed.  Patient agreeable to proceed.  • Chemo education.  • Advance Care Planning  o ACP discussion was held with the patient during this visit. Patient has an advance directive (not in EMR), copy requested.  • Return to office in 4 weeks.  • See Shawna for possible research study.     01/17/2022 - Consult with :  • After consideration of the diagnostic data and evaluation of the patient, I have recommended to treat the larynx with definitve radiation therapy, I anticipate a dose of 7000 cGy over 35 fractions, final course pending completion of planning.   • The patient and his family verbalize understanding of this discussion, voice no further questions and wish to proceed with recommendations. We will simulate treatment fields to begin the treatment planning.   • Continue ongoing management per primary care physician and other specialists. Thank you for allowing me to assist in this patients care.   Plan:  • Plan on 35 treatments, Monday-Friday for 30 minutes each  • Side effects may include fatigue, sore throat and mouth, loss of taste   • we will call you when to start, in about 1-2 weeks.     01/20/2022 - 03/22/2022 - Completed radiation course:  • Received 7000 cGy in 35 fractions to the epiglottis via IMRT     03/22/2022 - Appointment with :  ASSESSMENT:  • Squamous cell carcinoma of the epiglottis.  o AJCC stage:IVB(cT4b, cN2c, cM0)  o  status: Post weekly cisplatin 01/25/2022, 02/01/2022, 02/08/2022,  "02/15/2022, 03/15/2022 and 03/22/2022 with radiation.  • Performance status of 1.  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 \"I can't taste for months. 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 \"I quit smoking.\"  • Grade 2 thrombocytopenia from chemo on 02/22/2022 and 03/01/2022.   • Grade 3 neutropenia without fever from chemo on 03/08/2022.  • Normocytic anemia from chemo and chronic disease.   PLAN:   •  Re:  Weekly cisplatin cancellations due to thrombocytopenia/neutropenia with radiation.  He had completed chemoradiation 03/22/2022.   •  Re:  Heme status.  Hemoglobin 12.3, ANC 3.78, and platelet 287.   •  Re:  CMP.  GFR 99.7 mL/min  •  Re:  Magnesium at 2.3.  •  Re:  Iron saturation 38%, ferritin 481, B12 401, and folate 9.19 on 02/22/2022.  •  Re:  Stable for observation, epiglottic cancer.  •  Re:  Reassessment 4 to 8 weeks from completion of chemoradiation.  CT soft tissue neck in 12 weeks.  • CBC with differential in 4 weeks.  • To see Dr. Bird 03/22/2022.    • eRx Zofran 8 mg p.o. every 8 hours as needed for nausea and vomiting #60 with 2 refills if needed  • eRx Compazine 10 mg p.o. every 4 hours as needed for nausea and vomiting #60 with 2 refills if needed.  • Continue care per primary care physician and other specialists.  • Plan of care discussed with patient.  Understand expressed.  Patient agreeable to proceed.  • Advance Care Planning  o ACP discussion was held with the patient during this visit. Patient has an advance directive (not in EMR), copy requested.  • Return to office in 3 months with preoffice CBC with differential, CMP and CT soft tissue neck.     History obtained from  PATIENT, FAMILY, and CHART    PAST MEDICAL HISTORY  Past Medical History:   Diagnosis Date   • Cataract     LEFT   • Hearing loss d/t noise     LEFT   • Neoplasm of epiglottis       PAST SURGICAL HISTORY  Past Surgical History: "   Procedure Laterality Date   • LARYNGOSCOPY Bilateral 2021    Procedure: MICRODIRECT LARYNGOSCOPY WITH/WITHOUT LASER;  Surgeon: Robert Ayers Jr., MD;  Location:  [facility] OR;  Service: ENT;  Laterality: Bilateral;   • PANENDOSCOPY Bilateral 2021    Procedure: DIRECT LARYNGOSCOPY, ESOPHAGOSCOPY, BRONCHOSCOPY;  Surgeon: Robert Ayers Jr., MD;  Location:  [facility] OR;  Service: ENT;  Laterality: Bilateral;   • TEETH EXTRACTION     • TONSILLECTOMY        FAMILY HISTORY  family history includes Cancer in his mother and paternal grandmother.     SOCIAL HISTORY  Social History     Tobacco Use   • Smoking status: Former Smoker     Types: Cigars     Quit date: 2021     Years since quittin.3   • Smokeless tobacco: Never Used   Vaping Use   • Vaping Use: Never used   Substance Use Topics   • Alcohol use: Yes     Comment: OCC   • Drug use: Never     ALLERGIES  Patient has no known allergies.     MEDICATIONS    Current Outpatient Medications:   •  cetirizine (zyrTEC) 10 MG tablet, Take 10 mg by mouth Daily., Disp: , Rfl:   •  fluticasone (FLONASE) 50 MCG/ACT nasal spray, 2 sprays into the nostril(s) as directed by provider Daily., Disp: , Rfl:   •  ipratropium (ATROVENT) 0.06 % nasal spray, 2 sprays into the nostril(s) as directed by provider 4 (Four) Times a Day As Needed for Rhinitis. For drainage, Disp: 45 mL, Rfl: 3  •  Lidocaine Viscous HCl (XYLOCAINE) 2 % solution, Take 5 mL by mouth 4 (Four) Times a Day As Needed for Moderate Pain ., Disp: 100 mL, Rfl: 2  •  OLANZapine (zyPREXA) 5 MG tablet, 1 tablet po daily for 4 days when he starts chemo., Disp: 30 tablet, Rfl: 1  •  ondansetron (Zofran) 8 MG tablet, Take 1 tablet by mouth Every 8 (Eight) Hours As Needed for Nausea or Vomiting., Disp: 90 tablet, Rfl: 2  •  POTASSIUM CHLORIDE PO, Take  by mouth Every Other Day., Disp: , Rfl:   •  prochlorperazine (COMPAZINE) 10 MG tablet, Take 1 tablet by mouth Every 4 (Four) Hours As Needed for Nausea  "or Vomiting., Disp: 90 tablet, Rfl: 2  •  fluconazole (DIFLUCAN) 100 MG tablet, Take 1 tablet by mouth Daily., Disp: 8 tablet, Rfl: 0    Current outpatient and discharge medications have been reconciled for the patient.  Reviewed by: Damian Bird III, MD    The following portions of the patient's history were reviewed and updated as appropriate: allergies, current medications, past family history, past medical history, past social history, past surgical history and problem list.    REVIEW OF SYSTEMS  Review of Systems   Constitutional: Positive for appetite change, fatigue and unexpected weight change (down 23 lbs since 03/22/2022).   HENT: Positive for ear pain (Left ear), sinus pain (Frontal sinuses, left side worse; taking Claritin and Flonase ), sore throat, tinnitus (Left ear), trouble swallowing (Occasionally ) and voice change (hoarseness).         Taste 20-25%   2-3 Ensures daily      Eyes: Negative for visual disturbance.        Glasses    Respiratory: Positive for cough (productive with \"thick\" white sputum ) and shortness of breath (Occasionally ).    Cardiovascular: Negative.    Gastrointestinal: Positive for nausea.   Endocrine: Negative.    Genitourinary: Negative.    Musculoskeletal: Negative.    Skin: Negative.    Neurological: Positive for light-headedness (\"from not eating enough\" ) and headaches (per patient everytime eats. Starts above left eyebrow runs down left side of the head down the behind the left ear to the left neck, taking 2-3 advil  ). Negative for dizziness.   Hematological: Negative.    Psychiatric/Behavioral: Negative.      PHYSICAL EXAM  VITAL SIGNS:   Vitals:    06/10/22 1003   BP: 108/54   Pulse: 77   SpO2: 95%  Comment: room air   Weight: 60.3 kg (133 lb)   Height: 177.8 cm (70\")   PainSc:   6   PainLoc: Head  Comment: 2 advil      Physical Exam    General Appearance:  awake, alert, oriented, in no acute distress.  Head: Normocephalic  Eyes: Conjunctiva pink, pupils equal and " reactive.   Ears:  Normal externally.    Nose/Sinuses:  Mucosa normal. See laryngoscopy note.   Mouth/Throat:  Mucosa moist, no lesions;  Yellow-white patches noted on tongue and pharynx.  Neck: Supple, no mass, non-tender  Back:  Symmetric, no curvature, ROM normal, no CVA tenderness   Lungs:  Normal expansion.  Clear to auscultation.  No rales, rhonchi, or wheezing.  Heart:  Heart sounds are normal.  Regular rate and rhythm without murmur, gallop or rub.  Abdomen:  Soft, non-tender, normal ybowel sounds; no bruits, organomegaly or masses.  Extremities: Warm to touch, pink, with no edema. Pulses 2+ bilaterally  Musculoskeletal: strength and sensation grossly normal  Neurologic:  Alert and oriented, gait normal, non-focal exam  Psych exam: normal situational behavior   Skin:  Warm and moist. No suspicious lesions or rashes of concern    Performance Status: ECOG (1) Restricted in physically strenuous activity, ambulatory and able to do work of light nature    Clinical Quality Measures  -Pain Documented by Standardized Tool, FPS Enrique Coronado reports a pain score of 6. Given his pain assessment as noted, treatment options were discussed and the following options were decided upon as a follow-up plan to address the patient's pain: continuation of current treatment plan for pain and use of non-medical modalities (ice, heat, stretching and/or behavior modifications)  Pain Medications             OLANZapine (zyPREXA) 5 MG tablet 1 tablet po daily for 4 days when he starts chemo.    prochlorperazine (COMPAZINE) 10 MG tablet Take 1 tablet by mouth Every 4 (Four) Hours As Needed for Nausea or Vomiting.        -ADVANCED DIRECTIVE Advance Care Planning  ACP discussion was held with the patient during this visit. Patient does not have an advance directive, information provided.     Body Mass Index Screening and Follow-Up Plan Body mass index is 19.08 kg/m².     Tobacco Use: Screening and Cessation Intervention Social History     Tobacco Use      Smoking status: Former Smoker        Types: Cigars        Quit date: 2021        Years since quittin.3      Smokeless tobacco: Never Used    ASSESSMENT AND PLAN  1. Squamous cell cancer of epiglottis (HCC)    2. Former smoker    3. History of radiation therapy      Orders Placed This Encounter   Procedures   • Ambulatory Referral to ENT (Otolaryngology)     RECOMMENDATIONS:  .  Enrique Coronado is status post completion of radiation therapy to the epiglottis and presents to our clinic today for inital follow up exam. 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 He completed 7000 cGy in 35 fractions to the epiglottis on 2022.    We discussed the expected post radiation side effects and continued care and monitoring per NCCN Guidelines. We discussed the signs and symptoms of head and neck cancer including neck mass, persistent sore throat, ear pain, hemoptysis, weight loss and hoarseness. If any of these symptoms occur, to call for evaluation.    On exam today, he does have oral thrust, will send Diflucan for this. He complains today of headaches which are exacerbated each time he eats and is not relieved with OTC medications. He also complains of frontal sinus pain. Will reappoint to Dr. Ayers for further evaluation. Will continue follow-up/surveillance as discussed in 6 weeks and will continue care with other MDs.    Patient Instructions   1) Referral to Dr. Ayers, they will call you.   2) Prescription for Diflucan for thrush   3) Return to Dr. Bird in 6 weeks.     Time Spent: I spent 30 minutes caring for Enrique on this date of service. This time includes time spent by me in the following activities: preparing for the visit, reviewing tests, obtaining and/or reviewing a separately obtained history, performing a medically appropriate examination and/or evaluation, counseling and educating the patient/family/caregiver,  ordering medications, tests, or procedures, referring and communicating with other health care professionals, documenting information in the medical record and independently interpreting results and communicating that information with the patient/family/caregiver.   Damian Bird III, MD   06/10/2022

## 2022-06-10 NOTE — PATIENT INSTRUCTIONS
1) Referral to Dr. Ayers, they will call you.   2) Prescription for Diflucan for thrush   3) Return to Dr. Bird in 6 weeks.

## 2022-06-13 NOTE — PROGRESS NOTES
[facility] HEMATOLOGY & ONCOLOGY  [address]  [facility] [address] [phone]    Patient Name: Enrique Coronado  Encounter Date: 06/27/2022  YOB: 1950  Patient Number: [id number]      REASON FOR FOLLOW-UP: Enrique Coronado is a pleasant 72 y.o.  male who is seen on follow-up for squamous cell carcinoma, epiglottis.  He is seen 3 months from cisplatin with radiation.  He had treatment delays due to thrombocytopenia and neutropenia. He is seen alone. History is obtained from patient. History is considered to be accurate.        Oncology/Hematology History Overview Note     DIAGNOSTIC ABNORMALITIES:  He presented with left-sided neck mass 04/2021.  Patient had progressive symptoms with sinus drainage and unable to eat.  He has lost 60 pounds since 05/2021.  He was seen by Dr. Robert Ayers on 12/17/2021.  Examination showed a 3 cm firm immobile node, level 3, left neck.  Laryngoscopy showed pooling, erythema, questionable lesion in the inferior vallecula adjacent to the epiglottis.  Lingual tonsil hypertrophy.  Granulated lesion involving the lingual and laryngeal surface thickening of the epiglottis standing down the epiglottis into the petiole area and on to the anterior false cords.  Reduced mobility through vocal cords.  Biopsy x2 left neck mass.  Pathology report 12/20/2021 left neck fine-needle aspiration: Positive for malignancy metastatic squamous cell carcinoma.  CBC 12/20/2021 revealed a WBC of 8.1, hemoglobin 15.4, and platelets 194.  BMP remarkable for CO2 of 30.  He had undergone direct laryngoscopy, esophagoscopy, bronchoscopy, and MicroDirect laryngoscopy 12/27/2021.  Base of tongue mass noted in the vallecula that is quite firm, extending onto the lingual surface of the epiglottis, right tonsillar hypertrophy.  Supraglottic/epiglottic entirely consumed with firm tumor, both lingual and laryngeal surfaces,  extending to the area epiglottic folds midway; arytenoids appear intact.,  Hypertrophied.  Glottis false vocal cords with anterior involvement of the tumor extending down to the petiole and out onto the false vocal cords; true vocal cords appear unaffected by this, with normal mucosal surface of the subglottic neck, no tumor or scarring.  Follow-up with Dr. Robert Ayers on 01/06/2022.  Patient diagnosed with epiglottic cancer.  The tumor extended down to the glottic proper on the false vocal folds.  AJCC stage(cT4b, cN2b, cM0).  PET was ordered.  Patient referred to medical and radiation oncology.  Follow-up 02/17/2022.  PET scan 01/12/2022.   Hypermetabolic glottic and supraglottic mass is consistent with primary laryngeal malignancy. Hypermetabolic bilateral level 2 and 3 lymph nodes (5 total, 3 on the left and 2 on the right) as described above are consistent with  metastatic disease.        PREVIOUS INTERVENTIONS:  Cisplatin weekly, 01/25/2022, 02/01/2022, 02/08/2022, 02/15/2022, 03/15/2022 and 03/22/2022 with radiation.   Cisplatin was canceled on 02/22/2022 due to platelet of 59, 03/01/2022 due to platelet of 73 and on 03/08/2022 due to ANC of 0.92.     Squamous cell cancer of epiglottis (HCC)    Initial Diagnosis    Squamous cell cancer of epiglottis (HCC)     12/15/2021 Biopsy    Final Diagnosis   Lymph node, left neck, fine-needle aspiration:  Positive for malignancy.  Metastatic squamous cell carcinoma.        12/27/2021 Biopsy    Final Diagnosis   1.  Epiglottis, biopsy:  A.  Keratinizing squamous cell carcinoma, invasive.  B.  Maximum depth of invasion is 1.4 mm.  C.  No perineural invasion identified.  D.  p16 negative.    2.  Base of tongue, biopsy:  A.  Low-grade dysplasia of squamous mucosa.  B.  Reactive lymphoid hyperplasia in the subepithelial stroma.  C.  No histologic evidence of malignancy.    D.  Left false vocal cord, biopsy:  A.  Keratinizing squamous cell carcinoma, invasive.  B.  Tumor  invades to the depth of the biopsy (depth of invasion at least 1.91 mm).  C.  No perineural invasion identified.  D.  p16 negative.    4.  Right false vocal cord, biopsy:  A.  Keratinizing squamous cell carcinoma, invasive.  B.  Tumor invades to the depth of the biopsy (depth of invasion at least 2.44 mm).  C.  No perineural invasion identified.  E.  p16 negative.    AJCC stage: pTX pNX        1/12/2022 Imaging    PET/CT:  IMPRESSION:  1.  Hypermetabolic glottic and supraglottic mass is consistent with  primary laryngeal malignancy.  2.  Hypermetabolic bilateral level 2 and 3 lymph nodes (5 total, 3 on  the left and 2 on the right) as described above are consistent with  metastatic disease.     1/20/2022 - 3/22/2022 Radiation    Radiation OncologyTreatment Course:  Enrique Cornoado received 7000 cGy in 35 fractions to the epiglottis via IMRT.      1/25/2022 -  Chemotherapy    OP HEAD & NECK CISplatin (Weekly) + XRT     2/2/2022 Cancer Staged    Staging form: Cutaneous Carcinoma Of The Head And Neck, AJCC 8th Edition  - Pathologic stage from 2/2/2022: Stage IV (pT4b, pN2c, cM0) - Signed by Brad Rubin MD on 2/2/2022         PAST MEDICAL HISTORY:  ALLERGIES:  No Known Allergies  CURRENT MEDICATIONS:  Outpatient Encounter Medications as of 6/27/2022   Medication Sig Dispense Refill   • cetirizine (zyrTEC) 10 MG tablet Take 10 mg by mouth Daily.     • fluticasone (FLONASE) 50 MCG/ACT nasal spray 2 sprays into the nostril(s) as directed by provider Daily.     • HYDROcodone-acetaminophen (NORCO) 5-325 MG per tablet Take 1 tablet by mouth Every 6 (Six) Hours As Needed for Moderate Pain  or Severe Pain . 40 tablet 0   • fluconazole (DIFLUCAN) 100 MG tablet Take 1 tablet by mouth Daily. 8 tablet 0   • ipratropium (ATROVENT) 0.06 % nasal spray 2 sprays into the nostril(s) as directed by provider 4 (Four) Times a Day As Needed for Rhinitis. For drainage 45 mL 3   • Lidocaine Viscous HCl (XYLOCAINE) 2 % solution Take 5 mL by  mouth 4 (Four) Times a Day As Needed for Moderate Pain . 100 mL 2   • OLANZapine (zyPREXA) 5 MG tablet 1 tablet po daily for 4 days when he starts chemo. 30 tablet 1   • ondansetron (Zofran) 8 MG tablet Take 1 tablet by mouth Every 8 (Eight) Hours As Needed for Nausea or Vomiting. 90 tablet 2   • POTASSIUM CHLORIDE PO Take  by mouth Every Other Day.     • prochlorperazine (COMPAZINE) 10 MG tablet Take 1 tablet by mouth Every 4 (Four) Hours As Needed for Nausea or Vomiting. 90 tablet 2     No facility-administered encounter medications on file as of 6/27/2022.     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   • Mass of left side of neck R22.1   • Former smoker Z87.891   • History of radiation therapy Z92.3     SURGERIES:  Past Surgical History:   Procedure Laterality Date   • LARYNGOSCOPY Bilateral 12/27/2021    Procedure: MICRODIRECT LARYNGOSCOPY WITH/WITHOUT LASER;  Surgeon: Robert Ayers Jr., MD;  Location: [facility] OR;  Service: ENT;  Laterality: Bilateral;   • PANENDOSCOPY Bilateral 12/27/2021    Procedure: DIRECT LARYNGOSCOPY, ESOPHAGOSCOPY, BRONCHOSCOPY;  Surgeon: Robert Ayers Jr., MD;  Location: [facility] OR;  Service: ENT;  Laterality: Bilateral;   • TEETH EXTRACTION     • TONSILLECTOMY       HEALTH MAINTENANCE ITEMS:  Health Maintenance Due   Topic Date Due   • COLORECTAL CANCER SCREENING  Never done   • COVID-19 Vaccine (1) Never done   • TDAP/TD VACCINES (1 - Tdap) Never done   • ZOSTER VACCINE (1 of 2) Never done   • Pneumococcal Vaccine 65+ (1 - PCV) Never done   • HEPATITIS C SCREENING  Never done   • ANNUAL WELLNESS VISIT  Never done       <no information>  Last Completed Colonoscopy     This patient has no relevant Health Maintenance data.          There is no immunization history on file for this patient.  Last Completed Mammogram     This patient has no relevant Health Maintenance data.            FAMILY  "HISTORY:  Family History   Problem Relation Age of Onset   • Cancer Mother    • Cancer Paternal Grandmother      SOCIAL HISTORY:  Social History     Socioeconomic History   • Marital status: Single   Tobacco Use   • Smoking status: Former Smoker     Types: Cigars     Quit date: 2021     Years since quittin.3   • Smokeless tobacco: Never Used   Vaping Use   • Vaping Use: Never used   Substance and Sexual Activity   • Alcohol use: Yes     Comment: OCC   • Drug use: Never   • Sexual activity: Defer       REVIEW OF SYSTEMS:    Review of Systems   Constitutional: Positive for fatigue. Negative for chills and fever.        \"Hard to swallow.\"   HENT: Positive for trouble swallowing. Negative for congestion and nosebleeds.         \"I can live on Ensure and ice cream.  I can't swallow solids.\"   Eyes: Negative for redness and visual disturbance.   Respiratory: Negative for cough and shortness of breath.    Cardiovascular: Negative for chest pain and palpitations.   Gastrointestinal: Negative for nausea and rectal pain.   Endocrine: Negative for polydipsia and polyphagia.   Genitourinary: Negative for difficulty urinating, dysuria and flank pain.   Musculoskeletal: Negative for gait problem, myalgias and neck stiffness.   Skin: Positive for pallor.   Allergic/Immunologic: Negative for food allergies.   Neurological: Negative for dizziness, speech difficulty and weakness.   Hematological: Does not bruise/bleed easily.   Psychiatric/Behavioral: Negative for agitation, confusion and hallucinations.       VITAL SIGNS: /60   Pulse 80   Temp 97.8 °F (36.6 °C)   Resp 18   Ht 180.3 cm (71\")   Wt 59.1 kg (130 lb 3.2 oz)   SpO2 92%   BMI 18.16 kg/m²  He had lost 20 pounds since his last visit.   Pain Score    22 1025   PainSc:   2       PHYSICAL EXAMINATION:     Physical Exam  Vitals reviewed.   Constitutional:       Appearance: He is ill-appearing.      Comments: He arrived in the exam room with a cane. "   HENT:      Head: Normocephalic and atraumatic.      Comments: No neck adenopathy.   Eyes:      General: No scleral icterus.  Cardiovascular:      Rate and Rhythm: Normal rate.   Pulmonary:      Effort: No respiratory distress.      Breath sounds: No wheezing or rales.   Abdominal:      General: Bowel sounds are normal.      Palpations: Abdomen is soft.      Tenderness: There is no abdominal tenderness.   Musculoskeletal:         General: No swelling.      Cervical back: Neck supple.   Skin:     General: Skin is warm.      Coloration: Skin is pale.   Neurological:      Mental Status: He is alert and oriented to person, place, and time.   Psychiatric:         Mood and Affect: Mood normal.         Behavior: Behavior normal.         Judgment: Judgment normal.         LABS    Lab Results - Last 18 Months   Lab Units 06/21/22  1509 06/20/22  1012 04/19/22  1022 03/22/22  0801 03/15/22  0757 03/09/22  0951   HEMOGLOBIN g/dL 12.4* 12.8* 12.5* 12.3* 12.0* 11.8*   HEMATOCRIT % 38.8 39.3 38.5 38.1 36.4* 35.8*   MCV fL 97.5* 97.0 100.3* 97.7* 96.6 95.5   WBC 10*3/mm3 6.24 6.23 7.05 5.52 4.59 2.34*   RDW % 13.1 13.3 17.9* 19.2* 19.9* 19.0*   MPV fL 10.2 10.0 9.8 9.6 9.6 9.1   PLATELETS 10*3/mm3 183 164 134* 287 312 180   IMM GRAN % % 0.2 0.5 1.3* 3.4* 0.7* 0.4   NEUTROS ABS 10*3/mm3 4.70 4.87 5.46 3.78 2.90 1.32*   LYMPHS ABS 10*3/mm3 0.95 0.88 0.80 0.81 0.73 0.57*   MONOS ABS 10*3/mm3 0.48 0.37 0.51 0.62 0.83 0.34   EOS ABS 10*3/mm3 0.09 0.06 0.17 0.08 0.07 0.09   BASOS ABS 10*3/mm3 0.01 0.02 0.02 0.04 0.03 0.01   IMMATURE GRANS (ABS) 10*3/mm3 0.01 0.03 0.09* 0.19* 0.03 0.01   NRBC /100 WBC 0.0 0.0 0.0 0.0 0.0 0.0       Lab Results - Last 18 Months   Lab Units 06/21/22  1509 06/20/22  1012 04/19/22  1022 03/22/22  0801 03/15/22  0757 03/08/22  0757 03/01/22  0804 02/22/22  0801 02/15/22  0802 02/08/22  0812 02/01/22  0811 01/25/22  0805 01/14/22  1115   GLUCOSE mg/dL 86 102* 130* 121*  121* 108* 128*   < > 112* 115* 107*  130* 115* 106*   SODIUM mmol/L 140 140 140 141  141 140 141   < > 140 141 139 140 140 138   POTASSIUM mmol/L 4.9 4.7 4.5 4.5  4.5 4.4 4.4   < > 4.3 4.4 4.7 4.3 4.4 4.8   CO2 mmol/L 31.0* 31.0* 29.0 30.0*  30.0* 28.0 29.0   < > 29.0 31.0* 32.0* 29.0 33.0* 30.0*   CHLORIDE mmol/L 102 102 103 103  103 103 105   < > 103 102 101 103 102 101   ANION GAP mmol/L 7.0 7.0 8.0 8.0  8.0 9.0 7.0   < > 8.0 8.0 6.0 8.0 5.0 7.0   CREATININE mg/dL 0.70* 0.74* 0.63* 0.70*  0.70* 0.68* 0.64*   < > 0.70* 0.71* 0.67* 0.62* 0.71* 0.69*   BUN mg/dL 23 21 9 12  12 10 14   < > 12 6* 8 12 12 11   BUN / CREAT RATIO  32.9* 28.4* 14.3 17.1  17.1 14.7 21.9   < > 17.1 8.5 11.9 19.4 16.9 15.9   CALCIUM mg/dL 9.6 9.5 9.5 9.4  9.4 9.3 9.5   < > 8.9 9.0 9.0 8.8 8.8 9.3   EGFR IF NONAFRICN AM mL/min/1.73  --   --   --   --   --   --   --  112 110 118 129 110 114   ALK PHOS U/L 91 92 92 82 70 64   < > 78 81 103  --  73 84   TOTAL PROTEIN g/dL 7.6 7.4 6.8 6.5 6.9 6.6   < > 6.6 6.7 6.7  --  6.8 7.5   ALT (SGPT) U/L 17 18 10 15 14 10   < > 11 11 17  --  8 8   AST (SGOT) U/L 17 20 18 22 13 18   < > 18 18 23  --  12 18   BILIRUBIN mg/dL 0.4 0.6 0.5 0.3 0.7 0.4   < > 0.5 0.4 0.4  --  0.9 1.2   ALBUMIN g/dL 3.70 3.70 3.80 3.80 3.70 3.80   < > 3.80 3.90 3.80  --  3.80 4.20   GLOBULIN gm/dL 3.9 3.7 3.0 2.7 3.2 2.8   < > 2.8 2.8 2.9  --  3.0 3.3    < > = values in this interval not displayed.       No results for input(s): MSPIKE, KAPPALAMB, IGLFLC, URICACID, FREEKAPPAL, CEA, LDH, REFLABREPO in the last 08619 hours.    Lab Results - Last 18 Months   Lab Units 02/22/22  0801   IRON mcg/dL 98   TIBC mcg/dL 256*   IRON SATURATION % 38   FERRITIN ng/mL 481.00*   FOLATE ng/mL 9.19       Enrique Coronado reports a pain score of 2.  Given his pain assessment as noted, treatment options were discussed and the following options were decided upon as a follow-up plan to address the patient's pain: continuation of current treatment plan for  "pain.      ASSESSMENT:  1.  Squamous cell carcinoma of the epiglottis.  AJCC stage:Mónica(cT3, cN2c, cM0)  Treatment status: Post weekly cisplatin 01/25/2022, 02/01/2022, 02/08/2022, 02/15/2022, 03/15/2022 and 03/22/2022 with radiation.  2.  Performance status of 1.  3.  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 \"I have to gulp it quick, it gets stuck in my throat. Only ice cream.\"  4.  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 \"I quit smoking.\"  5.  Grade 2 thrombocytopenia from chemo on 02/22/2022 and 03/01/2022.   6.  Grade 3 neutropenia without fever from chemo on 03/08/2022.  7.  Normocytic anemia from chemo and chronic disease.           PLAN:   1.   Re:   Note from Dr. Bird on 06/10/2022.  Taste has returned about 25%.  He has thrush.  Diflucan was given. To see Dr. Ayers 06/30/2022. ER visit 06/21/2022. Unable to clear phlegm.   2.   Re:  Heme status.  Hemoglobin 12.4, ANC 4.4 and platelet 183.   3.   Re:  CMP.  GFR 99.1 mL/min  4.   Re:  Magnesium at 2.2.  5.   Re:  CBC on 04/19/2022.  WBC 7.05, hemoglobin 12.5, .3 and platelet 134.  6.   Re:  Stable for observation, epiglottic cancer.  7.   Re:  CT neck report 06/21/2022. Abnormal thickening of the glottic and supraglottic tissues related  to the known squamous cell carcinoma of the epiglottis. The thickening of the regional tissues may relate to radiation therapy, however residual tumor not excluded. Retained secretions within the region of the vallecula.  Decreasing left cervical lymphadenopathy with residual enlarged right jugular chain lymph node. Bilateral enhancement of the submandibular salivary glands likely related to radiation therapy. No soft tissue abscess collection.  Emphysema. No apical pneumothorax.  8.  Message Mila about seeing Dr. Bird sooner.  9.   To see Dr. Bird 07/21/2022.    10.  eRx Zofran 8 mg p.o. every 8 hours as needed for nausea and vomiting " #60 with 2 refills if needed  11.  eRx Compazine 10 mg p.o. every 4 hours as needed for nausea and vomiting #60 with 2 refills if needed.  12.  Continue care per primary care physician and other specialists.  13.  Plan of care discussed with patient.  Understand expressed.  Patient agreeable to proceed.  14.  Advance Care Planning   ACP discussion was held with the patient during this visit. Patient has an advance directive (not in EMR), copy requested.  15.  Return to office in 3 months with preoffice CBC with differential, CMP and CT soft tissue neck.        I have reviewed the assessment and plan and verified the accuracy of it. No changes to assessment and plan since the information was documented. Brad Rubin MD 06/27/22       I spent 32 total minutes, face-to-face, caring for Enrique today.  Greater than 50% of this time involved counseling and/or coordination of care as documented within this note regarding the patient's illness(es), pros and cons of various treatment options, instructions and/or risk reduction.             MD Robert Carrasquillo MD Debra Wilder, MD

## 2022-07-25 NOTE — PROGRESS NOTES
Robert Ayers Jr, MD  [facility] ENT [facility] EAR NOSE & THROAT  [address], [address]  [facility] [address]  Fax [phone]  Phone [phone]      Visit Type: FOLLOW UP   Chief Complaint   Patient presents with   • Hoarse     Patient states he has bad sinus drainage and headachertyu6        HPI   Accompanied by: No one  Enrique Coronado is a 72 y.o.  male who presents for follow up s/p Direct Laryngoscopy, Esophagoscopy, Bronchoscopy - Bilateral and Microdirect Laryngoscopy With/without Laser - Bilateral on 12/27/2021. Pateint has not been seen in a while. He says seen in May.   He has severe drainage. He cannot eat. He is losing weight. Been doing this since June.    Cancer Surveillance:  Cancer site: Supraglottis/Glottis  Initial staging: T4N2M0  Re-staging: none  Therapy: Radiation and Chemo completed   Completion therapy: 3/22/2022    Past Medical History:   Diagnosis Date   • Cataract     LEFT   • Hearing loss d/t noise     LEFT   • Neoplasm of epiglottis        Past Surgical History:   Procedure Laterality Date   • LARYNGOSCOPY Bilateral 12/27/2021    Procedure: MICRODIRECT LARYNGOSCOPY WITH/WITHOUT LASER;  Surgeon: Robert Ayers Jr., MD;  Location: [facility] OR;  Service: ENT;  Laterality: Bilateral;   • PANENDOSCOPY Bilateral 12/27/2021    Procedure: DIRECT LARYNGOSCOPY, ESOPHAGOSCOPY, BRONCHOSCOPY;  Surgeon: Robert Ayers Jr., MD;  Location: [facility];  Service: ENT;  Laterality: Bilateral;   • TEETH EXTRACTION     • TONSILLECTOMY         Family History: His family history includes Cancer in his mother and paternal grandmother.     Social History: He  reports that he quit smoking about 17 months ago. His smoking use included cigars. He has never used smokeless tobacco. He reports current alcohol use. He reports that he does not use drugs.    Home Medications:  HYDROcodone-acetaminophen, Lidocaine Viscous HCl, OLANZapine, Potassium Chloride,  cetirizine, fluconazole, fluticasone, ipratropium, ondansetron, oxyCODONE, polyethylene glycol, and prochlorperazine    Allergies:  He has No Known Allergies.       Vital Signs:   Temp:  [98.4 °F (36.9 °C)] 98.4 °F (36.9 °C)  Heart Rate:  [95] 95  BP: (152)/(81) 152/81  ENT Physical Exam  Constitutional  Appearance: patient appears well-developed, well-nourished and well-groomed,  Communication/Voice: communication appropriate for developmental age; vocal quality normal;  Constitutional comments: Cachectic  Head and Face  Appearance: head appears normal, face appears normal and face appears atraumatic;  Palpation: facial palpation normal;  Salivary: glands normal;  Ear  Hearing: impaired to conversational voice;  Auricles: bilateral auricles normal;  External Mastoids: bilateral external mastoids normal;  Ear Canals: bilateral ear canals normal;  Tympanic Membranes: bilateral tympanic membranes normal;  Nose  External Nose: nares patent bilaterally; external nose normal;  Oral Cavity/Oropharynx  Lips: normal;  Teeth: missing teeth (Edentulous upper and lower) and dentures (upper) noted;  Gums: gingiva normal;  Tongue: normal;  Oral mucosa: normal;  Hard palate: normal;  Soft palate: normal;  Tonsils: right tonsil fossa well-healed; bilateral tonsils absent,  Base of Tongue: normal; with no lesion present; Base of Tongue comments: palpation  Posterior pharyngeal wall: no lesion or mass noted;  Neck  Thyroid: no thyroid mass present;  Neck comments: Very thin with atrophy muscles.  Respiratory  Inspection: breathing unlabored; normal breathing rate;  Cardiovascular  Inspection: extremities are warm and well perfused; no peripheral edema present;  Lymphatic  Palpation: no cervical adenopathy noted;  Lymphatic comments: Left Level 3 3 cm firm, immobile mass  Neurovestibular  Mental Status: alert and oriented;  Psychiatric: mood normal; affect is appropriate;  Cranial Nerves: cranial nerves intact;  Drawings        Flexible laryngoscopy    Date/Time: 7/25/2022 3:33 PM  Performed by: Robert Ayers Jr., MD  Authorized by: Robert Ayers Jr., MD     Consent:     Consent obtained:  Verbal    Consent given by:  Patient    Alternatives discussed:  No treatment  Anesthesia (see MAR for exact dosages):     Anesthesia method:  Topical application    Topical anesthetic:  Tetracaine  Procedure details:     Indications: oncologic surveillance      Medication:  Afrin    Instrument: flexible fiberoptic laryngoscope    Sinus/ Nasopharynx:     Right nasopharynx: patent and inflammation      Left nasopharynx: patent and inflammation      Right eustachian tube: patent      Left eustachian tube: inflammation, patent and inflammation    Oropharynx/ Supraglottis:     Posterior pharyngeal wall: inflamed      Oropharynx: inflammation      Vallecula: lesion (Midline necrotic plaque) and inflammation      Base of tongue: lingual tonsillar hypertrophy (Central necrosis, lateral tonsils enlarged)      Epiglottis: inflammation (Moderate to severe) and retroflexed (By lingual tonsils)    Larynx/ Hypopharynx:     Arytenoids: inflammation      Hypopharynx: inflammation      Pyriform sinus: inflammation      False vocal cords: inflammation      True vocal cords: inflammation      True vocal cords: no immobility    Post-procedure details:     Patient tolerance of procedure:  Tolerated well  Comments:      Patient appears to have a necrotic plaque along the posterior base of tongue, centered.  Bilateral lateral lingual tonsils are enlarged       Result Review    RESULTS REVIEW    I have reviewed the patients old records in the chart.   I have reviewed the patients old records in the chart.   CT Soft Tissue Neck With Contrast (06/21/2022 20:02)      Assessment & Plan    Diagnoses and all orders for this visit:    1. Squamous cell cancer of epiglottis (HCC) (Primary)  Comments:  Probable recurrence  Overview:  Added automatically from request  for surgery [id number]    Orders:  -     Flexible laryngoscopy  -     NM PET/CT Skull Base to Mid Thigh  -     oxyCODONE (ROXICODONE) 5 MG/5ML solution; Take 7.5 mL by mouth Every 4 (Four) Hours As Needed for Moderate Pain .  Dispense: 150 mL; Refill: 0    2. Acquired deviated nasal septum    3. Abnormal weight loss  Comments:  Related to either radiation or recurrent cancer    4. Metastatic squamous cell carcinoma to head and neck (HCC)    5. Moderate protein-calorie malnutrition (HCC)  Comments:  Related to dysphagia    6. Cachexia (HCC)  Comments:  Related to possible cancer recurrence    7. Oropharyngeal dysphagia     Medical and surgical options were discussed including observation, medication modification, surgical management and PET. Risks, benefits and alternatives were discussed and questions were answered. After considering the options, the patient decided to proceed with PET.     Patient appears to have tumor recurrence.  I will get a PET scan to see if the patient has both recurrence and or any local spread.  I do not feel any neck adenopathy.  He will require biopsy after this.  I am seriously concerned about his nutrition.  He appears to have significant weight loss.  I will try to move somewhat quickly to discern whether the patient has recurrence or radiation necrosis.  PET  Oral Care Cancer patient  Plan biopsy      My Chart:  Patient is using My Chart    Patient, Spouse understand(s) and agree(s) with the treatment plan as described.    Return RTC after PET, for Recheck OP.      Robert Ayers Jr, MD  07/25/22  15:40 CDT

## 2022-07-25 NOTE — PATIENT INSTRUCTIONS
ORAL COMPLICATIONS OF CANCER TREATMENT    Cancer treatments can have a toxic effect on the mucous membranes of the mouth and throat tissues. Almost all patients who receive radiation therapy for head and neck malignancies will develop problems with their gums, mouth, tongue, or teeth. Chemotherapy and bone marrow transplants can also affect these areas.    Many of the problems can be prevented or minimized by careful and diligent attention. Patients should seek dental care prior to beginning the cancer treatments. Taking care of existing problems before therapy can prevent major problems later, including tooth and bone infection, tooth and bone loss, and pain.    Dr. Ayers works closely with the medical and radiation oncologists, as well as the dentist and oral care providers to ensure the optimal heal of the head and neck tissues during cancer treatments. However, the patient needs to maintain a routine to care for the teeth and mucus membranes before and especially during and after treatment.    General Oral Complications of Cancer Treatment  Inflammation and ulceration of the mucous membranes, called stomatitis or mucositis  Infection  Salivary gland dysfunction, called xerostomia  Tooth decay and demineralization  Impaired function; difficulty eating, swallowing, speaking  Chronic throat pain/ scratchiness  Altered taste perception  Poor nutrition    Additional complications of Chemotherapy  Nerve pain  Bleeding  Skin damage  Prolonged/delayed healing    Additional complications of Radiation therapy  Rampant dental decay  Skin damage  Jaw or neck stiffness  Increased susceptibility to injury and infection  Prolonged/delayed healing    HOW TO CARE FOR YOUR ORAL HEALTH DURING CANCER THERAPY  See a dentist prior to beginning radiation therapy  Brush your teeth and tongue gently with an extra soft, even bristled toothbrush and a bland toothpaste after every meal and at bedtime  Floss teeth once a day but avoid  areas that are bleeding or sore  Don’t use mouthwash that contains alcohol  Rinse mouth with baking soda/salt combination or an antiplaque solution at room temperature several times daily. You may also use plain water.  Use a waterpik aimed at the teeth, not the gums with a warm baking soda and salt mixture.  If you wear dentures, wear them only when necessary and never at night.  Increase water intake and use oral moisturizing gels.  Use fluoride if recommended by the dentist.  Exercise jaw muscles 3 times daily and more. Do 20 repetitions of opening and closing the mouth, gently stretching and holding open. If the jaw muscles are particularly stiff, you may insert your fingers and gently pull the jaws apart.  Avoid rough-textured or irritating foods. Drink sips of liquids with each bite to allow the food to soften or thin the food.  Use Chapstick, Blistex, or other soothing lip balms, including Vaseline or Polysporin to the lips.  If the lips begin to crust, do not pull this off is there is resistance. Instead, soak the lips with a warm wash cloth to gently loosen these crusts.  Talk with Dr. Ayers about pain or numbing medications, both topical and systemic.  Quit smoking, chewing, snuff. All of these tobacco products accelerate the decay process and increase cancer risks.    For a dry mouth:  Sip water frequently  Use a humidifier  Suck ice chips or sugar free candy  Chew sugar free gum  If desired, use saliva substitute or gel, or prescription saliva stimulant  Avoid lemon glycerin swabs and sugar candies and lozenges    Recipe for oral rinse:  Salt  1 tablespoon  Baking Soda 1 teaspoon  Water  1 quart  Mercy syrup 1 tablespoon    Optional:  Small amount of mouthwash for flavoring  If you have scabs or extremely thick mucous, you may mix the above 50:50 with hydrogen peroxide to help loosen this thick layer.    Dietary Instructions:  Choose soft, moist foods  Moisten foods with gravy, broth or  butter  Increase fluids with meals  Keep food bite-sized  Blenderize foods or use baby foods  Avoid:  Very hot or very cold beverages  Scratchy or rough foods like pretzels, chips, crackers or nuts  Spicy or salty foods like canned soups. vinegar, ketchup, salsa  Alcohol, beer, wine, whiskey, etc.  Strongly minted candies and toothpaste  Fumes like ammonia, household , paints, gasoline, solvents    Skin care with Cancer treatment:  Moisturize the area of treatment at least 4 times daily and more often  Use hypoallergenic moisturizers  Ask Dr. Ayers about certain healing creams and lotions  Avoid strong soaps and excessive makeup  Avoid the sun  If sun exposure is anticipated, use high SPF sunblocks and Zinc Oxide  Do not use razor blades  Avoid any trauma to the area, including squeezing pimples and popping blisters  Report any sores or skin breakdown to Dr. Ayers    Medications:  Continue all your daily medications  If you are experiencing side effects, report these to Dr. Ayers  Continue your multivitamins  Certain medications may interfere with radiation. Please check with Dr. Ayers  Certain natural and homeopathic products can interfere and lessen the efficacy of radiation, especially anti-oxidants. Please ask Dr. Ayers about these products.    Please do not hesitate to call the office for questions or problems.     PET scan ordered      CONTACT INFORMATION:  The main office phone number is [phone]. For emergencies after hours and on weekends, this number will convert over to our answering service and the on call provider will answer. Please try to keep non emergent phone calls/ questions to office hours 9am-5pm Monday through Friday.     [employer]  As an alternative, you can sign up and use the Epic MyChart system for more direct and quicker access for non emergent questions/ problems.  [facility] [employer] allows you to send messages to your doctor, view your test results, renew  your prescriptions, schedule appointments, and more. To sign up, go to [email or web address] and click on the Sign Up Now link in the New User? box. Enter your [employer] Activation Code exactly as it appears below along with the last four digits of your Social Security Number and your Date of Birth () to complete the sign-up process. If you do not sign up before the expiration date, you must request a new code.    [employer] Activation Code: Activation code not generated  Current [employer] Status: Active    If you have questions, you can email [email or web address] or call [phone] to talk to our [employer] staff. Remember, [employer] is NOT to be used for urgent needs. For medical emergencies, dial 911.

## 2022-08-02 NOTE — TELEPHONE ENCOUNTER
Spoke with patient about follow up appointment to discuss PET scan results. Appointment scheduled for 8/3 at 3:45

## 2022-08-02 NOTE — PROGRESS NOTES
[facility], .SKelsieKelsie Bird MD      Kieran Franks, APRN  ____________________________________________________________  [facility]  Department of Radiation Oncology  [address] [place] [id number]  Office:  [phone]  Fax: [phone]    DATE:  08/03/2022  PATIENT: Enrique Coronado  1950                         MEDICAL RECORD #:  [id number]                                                       REASON FOR VISIT:   No chief complaint on file.    Enrique Coronado is a very pleasant 72 y.o. patient that has completed radiation therapy to the epiglottis and returns to the clinic today for routine follow up exam. Reports throat pain. Denies activity change, appetite change, unexpected weight change, nausea/vomiting, diarrhea, light-headedness, weakness, and headaches. He follows  and .     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 He completed 7000 cGy in 35 fractions to the epiglottis on 03/22/2022.    12/15/2021 - Appointment with :  • Enrique Coronado is a 71 y.o. male with neck mass. He was referred. He has had sinus since 4/2021. He says he had drainage. He was unable to eat.  • He has lost 60 lbs. He noted since May.  • He says his nose bled as well.  • He is unaware of neck mass.  • He has had CT at [facility].  Recommendations:  • Patient has an epiglottic lesion causing pooling and aspiration.  I fear this is cancer.  I discussed my concerns with the patient.  I feel like patient needs a biopsy and panendoscopy.    • I have discussed risk, benefits, alternative treatments, options.    • The patient appears to understand and wishes to proceed.  • Plan endoscopy   • Patient understands plan as described and wishes to proceed.  • Return RTC 2 weeks after surgery, for Recheck throat.      12/15/2021 - Lymph node, left neck, fine-needle  aspiration per :  • Positive for malignancy.  • Metastatic squamous cell carcinoma.    12/27/2021 - DIRECT LARYNGOSCOPY, ESOPHAGOSCOPY, BRONCHOSCOPY, MICRODIRECT LARYNGOSCOPY WITH/WITHOUT LASER per :  • Epiglottis, biopsy:  o Keratinizing squamous cell carcinoma, invasive.  o Maximum depth of invasion is 1.4 mm.  o No perineural invasion identified.  o p16 negative.  • Base of tongue, biopsy:  o Low-grade dysplasia of squamous mucosa.  o Reactive lymphoid hyperplasia in the subepithelial stroma.  o No histologic evidence of malignancy.  • Left false vocal cord, biopsy:  o Keratinizing squamous cell carcinoma, invasive.  o Tumor invades to the depth of the biopsy (depth of invasion at least 1.91 mm).  o No perineural invasion identified.  o p16 negative.  • Right false vocal cord, biopsy:  o Keratinizing squamous cell carcinoma, invasive.  o Tumor invades to the depth of the biopsy (depth of invasion at least 2.44 mm).  o No perineural invasion identified.  o p16 negative.  • AJCC stage: pTX pNX    01/06/2022 - Appointment with :  • Patient has definitive diagnosis of cancer.  He has stage IV cancer of the epiglottis because it extends down into the true glottic area.  He also has left neck adenopathy.  I will get a PET scan and refer to radiation oncology, medical oncology.  I have discussed therapeutic options with the patient.  After he has seen consultants, he will decide on definitive therapy.  Surgical consideration would have to include laryngectomy given the extent of his tumor into the larynx.  • All care for the cancer patient  • Increase p.o. intake for nutrition  • Referral to radiation oncology and medical oncology   • Return in about 6 weeks (around 2/17/2022) for Recheck Larynx, possible flex scope.    01/12/2022 - PET Scan:  • There is a soft tissue mass involving the supraglottic larynx, right greater than left with effacement of the preglottic fat, involvement of the  epiglottis, effacement of the right piriform sinus and some involvement of the true vocal cord which is asymmetrically deviated medially. This mass shows maximum SUV of 9.2.   • There are 2 adjacent left side level 2 hypermetabolic metastatic lymph nodes, with maximum SUV of 7.8 and 4.3.  • There is a right level 2 lymph node showing maximum SUV of 4.5.   • A right level 2-3 lymph node showing maximum SUV of 6.6   • Left level 3 lymph node showing maximum SUV of 4.3.  Impression:  • Hypermetabolic glottic and supraglottic mass is consistent with primary laryngeal malignancy.  • Hypermetabolic bilateral level 2 and 3 lymph nodes (5 total, 3 on the left and 2 on the right) as described above are consistent with metastatic disease.    01/14/2022 - Consult with :  ASSESSMENT:  • Squamous cell carcinoma of the epiglottis.  o AJCC stage:IVB(cT4b, cN2c, cM0)  o Treatment status: Pending.  • Performance status of 0.  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  PLAN:   •  regarding the role for concurrent weekly cisplatin with radiation to minimize toxicity.  •  regarding potential adverse effects of chemotherapy especially infusion reactions, nausea, vomiting, cytopenias, neuropathy, ototoxicity, renal toxicity, alopecia and fatigue.  • Blood for CBC with differential, CMP and magnesium.  • Refer to Dr. Bird for concurrent chemo radiation.  • Schedule weekly chemo and radiation:  o Cisplatin 40 mg/m² D1.  • Pre-hydrate 1 L NS with 10 mEq KCl and 1 g magnesium.  Lasix 20 mg IV push after prehydration.  • Post hydrate with 1 L NS with 10 mEq KCl and 1 g magnesium.  Lasix 20 mg IV push after post hydration.  • Premed:  o Aloxi 0.25 mg.  o Emend 150 mg IV  o Decadron 12 mg IV.  • Weekly CBC with differential, CMP and magnesium once he starts chemo.  • eRx Zofran 8 mg p.o. every 8 hours as needed for nausea and vomiting #60 with 2  "refills.  • eRx Compazine 10 mg p.o. every 4 hours as needed for nausea and vomiting #60 with 2 refills.  • eRx Zyprexa 5 mg po daily for 4 days, start first day of chemo, # 4, 3 refills.   • Continue care per primary care physician and other specialists.  • Plan of care discussed with patient.  Understand expressed.  Patient agreeable to proceed.  • Chemo education.  • Advance Care Planning  o ACP discussion was held with the patient during this visit. Patient has an advance directive (not in EMR), copy requested.  • Return to office in 4 weeks.  • See Shawna for possible research study.     01/17/2022 - Consult with :  • After consideration of the diagnostic data and evaluation of the patient, I have recommended to treat the larynx with definitve radiation therapy, I anticipate a dose of 7000 cGy over 35 fractions, final course pending completion of planning.   • The patient and his family verbalize understanding of this discussion, voice no further questions and wish to proceed with recommendations. We will simulate treatment fields to begin the treatment planning.   • Continue ongoing management per primary care physician and other specialists. Thank you for allowing me to assist in this patients care.   Plan:  • Plan on 35 treatments, Monday-Friday for 30 minutes each  • Side effects may include fatigue, sore throat and mouth, loss of taste   • we will call you when to start, in about 1-2 weeks.     01/20/2022 - 03/22/2022 - Completed radiation course:  • Received 7000 cGy in 35 fractions to the epiglottis via IMRT     03/22/2022 - Appointment with :  ASSESSMENT:  • Squamous cell carcinoma of the epiglottis.  o AJCC stage:IVB(cT4b, cN2c, cM0)  o  status: Post weekly cisplatin 01/25/2022, 02/01/2022, 02/08/2022, 02/15/2022, 03/15/2022 and 03/22/2022 with radiation.  • Performance status of 1.  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 \"I can't taste for months. I have been that.\"  • Tobacco abuse, " "etiology of squamous cell carcinoma of the epiglottis. \"I quit smoking.\"  • Grade 2 thrombocytopenia from chemo on 02/22/2022 and 03/01/2022.   • Grade 3 neutropenia without fever from chemo on 03/08/2022.  • Normocytic anemia from chemo and chronic disease.   PLAN:   •  Re:  Weekly cisplatin cancellations due to thrombocytopenia/neutropenia with radiation.  He had completed chemoradiation 03/22/2022.   •  Re:  Heme status.  Hemoglobin 12.3, ANC 3.78, and platelet 287.   •  Re:  CMP.  GFR 99.7 mL/min  •  Re:  Magnesium at 2.3.  •  Re:  Iron saturation 38%, ferritin 481, B12 401, and folate 9.19 on 02/22/2022.  •  Re:  Stable for observation, epiglottic cancer.  •  Re:  Reassessment 4 to 8 weeks from completion of chemoradiation.  CT soft tissue neck in 12 weeks.  • CBC with differential in 4 weeks.  • To see Dr. Bird 03/22/2022.    • eRx Zofran 8 mg p.o. every 8 hours as needed for nausea and vomiting #60 with 2 refills if needed  • eRx Compazine 10 mg p.o. every 4 hours as needed for nausea and vomiting #60 with 2 refills if needed.  • Continue care per primary care physician and other specialists.  • Plan of care discussed with patient.  Understand expressed.  Patient agreeable to proceed.  • Advance Care Planning  o ACP discussion was held with the patient during this visit. Patient has an advance directive (not in EMR), copy requested.  • Return to office in 3 months with preoffice CBC with differential, CMP and CT soft tissue neck.    06/10/2022 - Appointment with :  • On exam today, he does have oral thrust, will send Diflucan for this. He complains today of headaches which are exacerbated each time he eats and is not relieved with OTC medications. He also complains of frontal sinus pain. Will reappoint to Dr. Ayers for further evaluation. Will continue follow-up/surveillance as discussed in 6 weeks and will continue care with other " "MDs.  Plan:  • Referral to Dr. Ayers, they will call you.   • Prescription for Diflucan for thrush   • Return to Dr. Bird in 6 weeks.     06/21/2022 - CT Soft tissue neck with contrast:  • Abnormal thickening of the glottic and supraglottic tissues related to the known squamous cell carcinoma of the epiglottis. The thickening of the regional tissues may relate to radiation therapy, however residual tumor not excluded. Retained secretions within the region of the vallecula.  • Decreasing left cervical lymphadenopathy with residual enlarged right jugular chain lymph node.  • Bilateral enhancement of the submandibular salivary glands likely related to radiation therapy. No soft tissue abscess collection.  • Emphysema. No apical pneumothorax.    06/27/2022 - Appointment with :  ASSESSMENT:  • Squamous cell carcinoma of the epiglottis.  o AJCC stage:Mónica(cT3, cN2c, cM0)  o Treatment status: Post weekly cisplatin 01/25/2022, 02/01/2022, 02/08/2022, 02/15/2022, 03/15/2022 and 03/22/2022 with radiation.  o Performance status of 1.  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 \"I have to gulp it quick, it gets stuck in my throat. 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 \"I quit smoking.\"  • Grade 2 thrombocytopenia from chemo on 02/22/2022 and 03/01/2022.   • Grade 3 neutropenia without fever from chemo on 03/08/2022.  • Normocytic anemia from chemo and chronic disease.  PLAN:   •  Re:   Note from Dr. Bird on 06/10/2022.  Taste has returned about 25%.  He has thrush.  Diflucan was given. To see Dr. Ayers 06/30/2022. ER visit 06/21/2022. Unable to clear phlegm.   •  Re:  Heme status.  Hemoglobin 12.4, ANC 4.4 and platelet 183.   •  Re:  CMP.  GFR 99.1 mL/min  •  Re:  Magnesium at 2.2.  •  Re:  CBC on 04/19/2022.  WBC 7.05, hemoglobin 12.5, .3 and platelet 134.  •  Re:  Stable for observation, epiglottic cancer.  •  " Re:  CT neck report 06/21/2022. Abnormal thickening of the glottic and supraglottic tissues related  • to the known squamous cell carcinoma of the epiglottis. The thickening of the regional tissues may relate to radiation therapy, however residual tumor not excluded. Retained secretions within the region of the vallecula.  • Decreasing left cervical lymphadenopathy with residual enlarged right jugular chain lymph node. Bilateral enhancement of the submandibular salivary glands likely related to radiation therapy. No soft tissue abscess collection.  Emphysema. No apical pneumothorax.  • Message Mila about seeing Dr. Bird sooner.  • To see Dr. Bird 07/21/2022.    • eRx Zofran 8 mg p.o. every 8 hours as needed for nausea and vomiting #60 with 2 refills if needed  • eRx Compazine 10 mg p.o. every 4 hours as needed for nausea and vomiting #60 with 2 refills if needed.  • Continue care per primary care physician and other specialists.  • Plan of care discussed with patient.  Understand expressed.  Patient agreeable to proceed.  • Advance Care Planning  o ACP discussion was held with the patient during this visit. Patient has an advance directive (not in EMR), copy requested.  • Return to office in 3 months with preoffice CBC with differential, CMP and CT soft tissue neck.    07/26/2022 - Appointment with :  • Medical and surgical options were discussed including observation, medication modification, surgical management and PET. Risks, benefits and alternatives were discussed and questions were answered. After considering the options, the patient decided to proceed with PET.  • Patient appears to have tumor recurrence.  I will get a PET scan to see if the patient has both recurrence and or any local spread.  I do not feel any neck adenopathy.  He will require biopsy after this.  I am seriously concerned about his nutrition.  He appears to have significant weight loss.  I will try to move somewhat quickly  to discern whether the patient has recurrence or radiation necrosis.  • PET  • Oral Care Cancer patient  • Plan biopsy     07/29/2022 - PET Scan:  • Background right hepatic lobe FDG uptake. SUV max = 1.92.  • 10-12 mm upper right jugular/submandibular lymph node with moderate FDG uptake. SUV max = 6.16.  • Left cervical lymphadenopathy has resolved. SUV max in this region = 2.01.  • Low level uptake within a nonenlarged right hilar lymph node. SUV max = 2.78.  • Indeterminate patchy bilateral lung infiltrate for which inflammatory disease is favored.  • No abnormality is seen within the abdomen or pelvis.  Plan:  • The dominant abnormality is neoplastic involvement of an upper right cervical lymph node.      History obtained from  PATIENT, FAMILY, and CHART    PAST MEDICAL HISTORY  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   • Neoplasm of epiglottis    • Persistent headaches     d/t radiation   • SOB (shortness of breath) on exertion    • Tinnitus       PAST SURGICAL HISTORY  Past Surgical History:   Procedure Laterality Date   • EYE SURGERY Left     steel removed   • LARYNGOSCOPY Bilateral 12/27/2021    Procedure: MICRODIRECT LARYNGOSCOPY WITH/WITHOUT LASER;  Surgeon: Robert Ayers Jr., MD;  Location: [facility] OR;  Service: ENT;  Laterality: Bilateral;   • PANENDOSCOPY Bilateral 12/27/2021    Procedure: DIRECT LARYNGOSCOPY, ESOPHAGOSCOPY, BRONCHOSCOPY;  Surgeon: Robert Ayers Jr., MD;  Location: [facility] OR;  Service: ENT;  Laterality: Bilateral;   • PEG TUBE INSERTION N/A 8/11/2022    Procedure: ESOPHAGOGASTRODUODENOSCOPY WITH PERCUTANEOUS ENDOSCOPIC GASTROSTOMY TUBE INSERTION WITH ANESTHESIA;  Surgeon: Nano Schwab MD;  Location: [facility] OR;  Service: General;   Laterality: N/A;   • TEETH EXTRACTION     • TONSILLECTOMY        FAMILY HISTORY  family history includes Cancer in his mother and paternal grandmother.     SOCIAL HISTORY  Social History     Tobacco Use   • Smoking status: Former Smoker     Types: Cigars     Quit date: 2021     Years since quittin.4   • Smokeless tobacco: Never Used   Vaping Use   • Vaping Use: Never used   Substance Use Topics   • Alcohol use: Yes     Comment: OCC   • Drug use: Never     ALLERGIES  Patient has no known allergies.     MEDICATIONS    Current Outpatient Medications:   •  fluticasone (FLONASE) 50 MCG/ACT nasal spray, 2 sprays into the nostril(s) as directed by provider Daily As Needed for Rhinitis., Disp: , Rfl:   •  acetaminophen (TYLENOL) 160 MG/5ML liquid, Administer 20.3 mL per G tube Every 8 (Eight) Hours As Needed for Mild Pain ., Disp: 355 mL, Rfl: 3  •  docusate (COLACE) 50 MG/5ML liquid, Administer 5 mL per G tube 2 (Two) Times a Day for 30 days., Disp: 473 mL, Rfl: 2  •  Multiple Vitamins-Minerals (multivitamin with iron-minerals, CENTRUM,) liquid, Administer 5 mL per G tube Daily for 30 days., Disp: 150 mL, Rfl: 2  •  ondansetron ODT (Zofran ODT) 4 MG disintegrating tablet, Place 1 tablet on the tongue Every 8 (Eight) Hours As Needed for Nausea or Vomiting., Disp: 20 tablet, Rfl: 0  •  oxyCODONE (ROXICODONE) 5 MG/5ML solution, Administer 5 mL per G tube Every 6 (Six) Hours As Needed for Moderate Pain  for up to 6 days., Disp: 120 mL, Rfl: 0    Current outpatient and discharge medications have been reconciled for the patient.  Reviewed by: Damian Bird III, MD    The following portions of the patient's history were reviewed and updated as appropriate: allergies, current medications, past family history, past medical history, past social history, past surgical history and problem list.    REVIEW OF SYSTEMS  Review of Systems   Constitutional: Positive for appetite change and fatigue. Negative for activity change  "and unexpected weight change.   HENT: Positive for ear pain, tinnitus and trouble swallowing.    Eyes: Negative.    Respiratory: Negative.  Negative for cough and shortness of breath.    Cardiovascular: Negative.  Negative for chest pain, palpitations and leg swelling.   Gastrointestinal: Negative.    Genitourinary: Negative.    Musculoskeletal: Negative.    Skin: Negative.    Neurological: Positive for headaches.   Hematological: Positive for adenopathy.   Psychiatric/Behavioral: Negative.    All other systems reviewed and are negative.     PHYSICAL EXAM  VITAL SIGNS:   Vitals:    08/03/22 1351   BP: 126/78   Pulse: 84   Resp: 18   SpO2: 92%   Weight: 59 kg (130 lb)   Height: 180.3 cm (71\")   PainSc: 7  Comment: When eating   PainLoc: Throat      Physical Exam  Vitals reviewed.   Constitutional:       General: He is not in acute distress.     Appearance: Normal appearance.   HENT:      Head: Normocephalic.      Mouth/Throat:      Mouth: Mucous membranes are dry.   Cardiovascular:      Rate and Rhythm: Normal rate and regular rhythm.      Pulses: Normal pulses.      Heart sounds: Normal heart sounds.   Pulmonary:      Effort: Pulmonary effort is normal.      Breath sounds: Normal breath sounds.   Abdominal:      General: Bowel sounds are normal.      Palpations: Abdomen is soft.   Musculoskeletal:         General: Normal range of motion.      Cervical back: Neck supple.   Lymphadenopathy:      Cervical: Cervical adenopathy present.   Skin:     General: Skin is warm.      Capillary Refill: Capillary refill takes less than 2 seconds.   Neurological:      General: No focal deficit present.      Mental Status: He is alert and oriented to person, place, and time. Mental status is at baseline.         Performance Status: ECOG (1) Restricted in physically strenuous activity, ambulatory and able to do work of light nature    Clinical Quality Measures  -Pain Documented by Standardized Tool, FPS Enrique Coronado reports a pain " score of 7. Given his pain assessment as noted, treatment options were discussed and the following options were decided upon as a follow-up plan to address the patient's pain: continuation of current treatment plan for pain and use of non-medical modalities (ice, heat, stretching and/or behavior modifications)  Pain Medications         -ADVANCED DIRECTIVE Advance Care Planning  ACP discussion was held with the patient during this visit. Patient does not have an advance directive, information provided.     Body Mass Index Screening and Follow-Up Plan Body mass index is 18.13 kg/m².     Tobacco Use: Screening and Cessation Intervention Social History    Tobacco Use      Smoking status: Former Smoker        Types: Cigars        Quit date: 2021        Years since quittin.4      Smokeless tobacco: Never Used    ASSESSMENT AND PLAN  1. Squamous cell cancer of epiglottis (HCC)    2. History of radiation therapy    3. Former smoker      No orders of the defined types were placed in this encounter.    RECOMMENDATIONS:  .  Enrique Coronado is status post completion of radiation therapy to the epiglottis and presents to our clinic today for routine follow up exam. 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 He completed 7000 cGy in 35 fractions to the epiglottis on 2022.    CT Soft tissue neck completed on 2022 revealed abnormal thickening of the glottic and supraglottic tissues related to the known squamous cell carcinoma of the epiglottis. The thickening of the regional tissues may relate to radiation therapy, however residual tumor not excluded. Retained secretions within the region of the vallecula. Decreasing left cervical lymphadenopathy with residual enlarged right jugular chain lymph node. Bilateral enhancement of the submandibular salivary glands likely related to radiation therapy. No soft tissue abscess collection. Emphysema. No apical  pneumothorax.     PET scan was completed on 07/29/2022 revealing dominant abnormality is neoplastic involvement of an upper right cervical lymph node. He is being followed per  and . He is scheduled to see Dr. Ayers today for further evaluation of the PET scan findings. He will return in 1 month for follow up.        Patient Instructions   1) Return in 1 month       Time Spent: I spent 30 minutes caring for Enrique on this date of service. This time includes time spent by me in the following activities: preparing for the visit, reviewing tests, obtaining and/or reviewing a separately obtained history, performing a medically appropriate examination and/or evaluation, counseling and educating the patient/family/caregiver, ordering medications, tests, or procedures, referring and communicating with other health care professionals, documenting information in the medical record and independently interpreting results and communicating that information with the patient/family/caregiver.   Damian Bird III, MD   08/03/2022

## 2022-08-02 NOTE — TELEPHONE ENCOUNTER
----- Message from Robert Ayers Jr., MD sent at 8/2/2022  1:18 PM CDT -----  Patient needs to come in to see me fairly soon to review results.  I will discuss results with him when he arrives.

## 2022-08-03 NOTE — PATIENT INSTRUCTIONS
See General surgery for feeding tube    ORAL COMPLICATIONS OF CANCER TREATMENT    Cancer treatments can have a toxic effect on the mucous membranes of the mouth and throat tissues. Almost all patients who receive radiation therapy for head and neck malignancies will develop problems with their gums, mouth, tongue, or teeth. Chemotherapy and bone marrow transplants can also affect these areas.    Many of the problems can be prevented or minimized by careful and diligent attention. Patients should seek dental care prior to beginning the cancer treatments. Taking care of existing problems before therapy can prevent major problems later, including tooth and bone infection, tooth and bone loss, and pain.    Dr. Ayers works closely with the medical and radiation oncologists, as well as the dentist and oral care providers to ensure the optimal heal of the head and neck tissues during cancer treatments. However, the patient needs to maintain a routine to care for the teeth and mucus membranes before and especially during and after treatment.    General Oral Complications of Cancer Treatment  Inflammation and ulceration of the mucous membranes, called stomatitis or mucositis  Infection  Salivary gland dysfunction, called xerostomia  Tooth decay and demineralization  Impaired function; difficulty eating, swallowing, speaking  Chronic throat pain/ scratchiness  Altered taste perception  Poor nutrition    Additional complications of Chemotherapy  Nerve pain  Bleeding  Skin damage  Prolonged/delayed healing    Additional complications of Radiation therapy  Rampant dental decay  Skin damage  Jaw or neck stiffness  Increased susceptibility to injury and infection  Prolonged/delayed healing    HOW TO CARE FOR YOUR ORAL HEALTH DURING CANCER THERAPY  See a dentist prior to beginning radiation therapy  Brush your teeth and tongue gently with an extra soft, even bristled toothbrush and a bland toothpaste after every meal and at  bedtime  Floss teeth once a day but avoid areas that are bleeding or sore  Don’t use mouthwash that contains alcohol  Rinse mouth with baking soda/salt combination or an antiplaque solution at room temperature several times daily. You may also use plain water.  Use a waterpik aimed at the teeth, not the gums with a warm baking soda and salt mixture.  If you wear dentures, wear them only when necessary and never at night.  Increase water intake and use oral moisturizing gels.  Use fluoride if recommended by the dentist.  Exercise jaw muscles 3 times daily and more. Do 20 repetitions of opening and closing the mouth, gently stretching and holding open. If the jaw muscles are particularly stiff, you may insert your fingers and gently pull the jaws apart.  Avoid rough-textured or irritating foods. Drink sips of liquids with each bite to allow the food to soften or thin the food.  Use Chapstick, Blistex, or other soothing lip balms, including Vaseline or Polysporin to the lips.  If the lips begin to crust, do not pull this off is there is resistance. Instead, soak the lips with a warm wash cloth to gently loosen these crusts.  Talk with Dr. Ayers about pain or numbing medications, both topical and systemic.  Quit smoking, chewing, snuff. All of these tobacco products accelerate the decay process and increase cancer risks.    For a dry mouth:  Sip water frequently  Use a humidifier  Suck ice chips or sugar free candy  Chew sugar free gum  If desired, use saliva substitute or gel, or prescription saliva stimulant  Avoid lemon glycerin swabs and sugar candies and lozenges    Recipe for oral rinse:  Salt  1 tablespoon  Baking Soda 1 teaspoon  Water  1 quart  Mercy syrup 1 tablespoon    Optional:  Small amount of mouthwash for flavoring  If you have scabs or extremely thick mucous, you may mix the above 50:50 with hydrogen peroxide to help loosen this thick layer.    Dietary Instructions:  Choose soft, moist foods  Moisten  foods with gravy, broth or butter  Increase fluids with meals  Keep food bite-sized  Blenderize foods or use baby foods  Avoid:  Very hot or very cold beverages  Scratchy or rough foods like pretzels, chips, crackers or nuts  Spicy or salty foods like canned soups. vinegar, ketchup, salsa  Alcohol, beer, wine, whiskey, etc.  Strongly minted candies and toothpaste  Fumes like ammonia, household , paints, gasoline, solvents    Skin care with Cancer treatment:  Moisturize the area of treatment at least 4 times daily and more often  Use hypoallergenic moisturizers  Ask Dr. Ayers about certain healing creams and lotions  Avoid strong soaps and excessive makeup  Avoid the sun  If sun exposure is anticipated, use high SPF sunblocks and Zinc Oxide  Do not use razor blades  Avoid any trauma to the area, including squeezing pimples and popping blisters  Report any sores or skin breakdown to Dr. Ayers    Medications:  Continue all your daily medications  If you are experiencing side effects, report these to Dr. Ayers  Continue your multivitamins  Certain medications may interfere with radiation. Please check with Dr. Ayers  Certain natural and homeopathic products can interfere and lessen the efficacy of radiation, especially anti-oxidants. Please ask Dr. Ayers about these products.    Please do not hesitate to call the office for questions or problems.     CONTACT INFORMATION:  The main office phone number is [phone]. For emergencies after hours and on weekends, this number will convert over to our answering service and the on call provider will answer. Please try to keep non emergent phone calls/ questions to office hours 9am-5pm Monday through Friday.     BaseTrace  As an alternative, you can sign up and use the Epic MyChart system for more direct and quicker access for non emergent questions/ problems.  [facility] BaseTrace allows you to send messages to your doctor, view your test results,  renew your prescriptions, schedule appointments, and more. To sign up, go to [email or web address] and click on the Sign Up Now link in the New User? box. Enter your Javelin Activation Code exactly as it appears below along with the last four digits of your Social Security Number and your Date of Birth () to complete the sign-up process. If you do not sign up before the expiration date, you must request a new code.    Javelin Activation Code: Activation code not generated  Current Javelin Status: Active    If you have questions, you can email [email or web address] or call [phone] to talk to our Javelin staff. Remember, Javelin is NOT to be used for urgent needs. For medical emergencies, dial 911.

## 2022-08-03 NOTE — PROGRESS NOTES
Robert Ayers Jr, MD  [facility] & [facility]  [address], [address]  [facility] [address]  Fax [phone]  Phone [phone]      Visit Type: FOLLOW UP   Chief Complaint   Patient presents with   • Follow-up     Discuss PET scan results        HPI   Accompanied by: No one  Enrique Coronado is a 72 y.o.  male who presents for follow up s/p Direct Laryngoscopy, Esophagoscopy, Bronchoscopy - Bilateral and Microdirect Laryngoscopy With/without Laser - Bilateral on 12/27/2021. Sore L neck   Compains thick secretions  Not eating  He has L neck and head pain. He feels drawing.    Cancer Surveillance:  Cancer site: Epiglottis/base of tongue  Initial staging: T3 N2 M0  Re-staging: None  Therapy: Radiation, chemotherapy  Completion therapy: 3/2022    Past Medical History:   Diagnosis Date   • Cataract     LEFT   • Hearing loss d/t noise     LEFT   • Neoplasm of epiglottis        Past Surgical History:   Procedure Laterality Date   • LARYNGOSCOPY Bilateral 12/27/2021    Procedure: MICRODIRECT LARYNGOSCOPY WITH/WITHOUT LASER;  Surgeon: Robert Ayers Jr., MD;  Location: [facility];  Service: ENT;  Laterality: Bilateral;   • PANENDOSCOPY Bilateral 12/27/2021    Procedure: DIRECT LARYNGOSCOPY, ESOPHAGOSCOPY, BRONCHOSCOPY;  Surgeon: Robert Ayers Jr., MD;  Location: [facility];  Service: ENT;  Laterality: Bilateral;   • TEETH EXTRACTION     • TONSILLECTOMY         Family History: His family history includes Cancer in his mother and paternal grandmother.     Social History: He  reports that he quit smoking about 17 months ago. His smoking use included cigars. He has never used smokeless tobacco. He reports current alcohol use. He reports that he does not use drugs.    Home Medications:  HYDROcodone-acetaminophen, Lidocaine Viscous HCl, OLANZapine, Potassium Chloride, cetirizine, fluconazole, fluticasone, ipratropium, ondansetron, oxyCODONE,  polyethylene glycol, and prochlorperazine    Allergies:  He has No Known Allergies.       Vital Signs:   Temp:  [98.9 °F (37.2 °C)] 98.9 °F (37.2 °C)  Heart Rate:  [84-86] 86  Resp:  [18] 18  BP: (125-126)/(75-78) 125/75  ENT Physical Exam  Constitutional  Appearance: patient appears well-developed, well-nourished and well-groomed,  Communication/Voice: communication appropriate for developmental age; vocal quality normal;  Constitutional comments: Cachectic  Head and Face  Appearance: head appears normal, face appears normal and face appears atraumatic;  Palpation: facial palpation normal;  Salivary: glands normal;  Ear  Hearing: impaired to conversational voice;  Auricles: bilateral auricles normal;  External Mastoids: bilateral external mastoids normal;  Ear Canals: bilateral ear canals normal;  Tympanic Membranes: bilateral tympanic membranes normal;  Nose  External Nose: nares patent bilaterally; external nose normal;  Oral Cavity/Oropharynx  Lips: normal;  Teeth: missing teeth (Edentulous upper and lower) and dentures (upper) noted;  Gums: gingiva normal;  Tongue: normal;  Oral mucosa: normal;  Hard palate: normal;  Soft palate: normal;  Tonsils: right tonsil fossa well-healed; bilateral tonsils absent,  Base of Tongue: normal; with no lesion present; Base of Tongue comments: palpation  Posterior pharyngeal wall: no lesion or mass noted;  Neck  Neck: neck normal; neck palpation normal;  Thyroid: thyroid normal;  Neck comments: Very thin with atrophy muscles.  Respiratory  Inspection: breathing unlabored; normal breathing rate;  Cardiovascular  Inspection: extremities are warm and well perfused; no peripheral edema present;  Lymphatic  Palpation: no cervical adenopathy noted;  Lymphatic comments: Left Level 3 3 cm firm, immobile mass  Neurovestibular  Mental Status: alert and oriented;  Psychiatric: mood normal; affect is appropriate;  Cranial Nerves: cranial nerves intact;  Drawings           Result Review     RESULTS REVIEW    I have reviewed the patients old records in the chart.   I have reviewed the patients old records in the chart.  The following results/records were reviewed:  I reviewed the patient's new imaging results and agree with the interpretation.   NM PET/CT Skull Base to Mid Thigh (07/29/2022 12:26) reviewed, agree with radiology interpretation      Assessment & Plan    Diagnoses and all orders for this visit:    1. 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 Acquired deviated nasal septum  Comments:  No change    3. Abnormal weight loss  Comments:  Related oropharyngeal dysphagia, radiation therapy for cancer    4. 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 Moderate protein-calorie malnutrition (HCC)  Comments:  Worsening    6. Cachexia (HCC)  Comments:  Worsening  Orders:  -     Ambulatory Referral to General Surgery     Continue current management plan.     Patient appears to have severe cachexia related oropharyngeal dysphagia.  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  I feel the patient needs a PEG tube.  I will refer him to general surgery for PEG tube placement.  PET scan reveals no metabolic activity in the base of tongue/epiglottis.  He does have some metabolic activity in the right submandibular area.  Clinically, he has no mass there.  Because of his nutritional status, I will hold off selective neck dissection.  I plan to increase his nutrition and see if he is a better candidate for surgery.  I will refer for PEG tube at this point in time.  I plan to follow the patient very closely for clinical changes in his right neck.  Oral care for the cancer patient  General surgery referral for PEG tube placement      My Chart:  Patient is using My Chart    Patient understand(s)  and agree(s) with the treatment plan as described.  Return in about 6 weeks (around 9/14/2022) for Recheck R neck and base of tongue, flex scope.      Robert Ayers Jr, MD  08/03/22  16:01 CDT

## 2022-08-08 PROBLEM — R13.12 OROPHARYNGEAL DYSPHAGIA: Status: ACTIVE | Noted: 2022-01-01

## 2022-08-08 PROBLEM — E43 SEVERE PROTEIN-CALORIE MALNUTRITION: Status: ACTIVE | Noted: 2022-01-01

## 2022-08-08 NOTE — PATIENT INSTRUCTIONS
Surgery is scheduled for 8/11/22 at 0830am  Prework is scheduled for 8/9/22 at 1115am  Nothing to eat or drink after midnight before surgery.  No Aspirin, Vitamins or Blood Thinners 5 days prior to surgery.  Please report to the hospital main registation for check in on both days.

## 2022-08-08 NOTE — PROGRESS NOTES
Office New Patient History and Physical:     Referring Provider: No ref. provider found    Chief Complaint   Patient presents with   • feeding tube       Subjective .     History of present illness:  Enrique Coronado is a 72 y.o. male who presents to discuss feeding tube placement. 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 On follow up with Dr. 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 Dr. Ayers hopes to improve his nutrition prior to selective neck dissection.     He is Not on blood thinners. He has never had abdominal surgery. He is a former smoker. His BMI is 18.     Review of Systems   Constitutional: Positive for appetite change, fatigue and unexpected weight change. Negative for chills, diaphoresis and fever.   HENT: Positive for congestion, postnasal drip and rhinorrhea.    Eyes: Negative for pain, redness and visual disturbance.   Respiratory: Positive for cough. Negative for chest tightness and shortness of breath.    Cardiovascular: Negative for chest pain and palpitations.   Gastrointestinal: Positive for nausea. Negative for abdominal distention, abdominal pain, constipation, diarrhea and vomiting.   Musculoskeletal: Negative for arthralgias, back pain and gait problem.   Skin: Negative for color change.   Neurological: Negative for dizziness, seizures, facial asymmetry and headaches.   Psychiatric/Behavioral: Negative for agitation, behavioral problems and confusion.       History  Past Medical History:   Diagnosis Date   • Cataract     LEFT   • Hearing loss d/t noise     LEFT   • Neoplasm of epiglottis    ,   Past Surgical History:   Procedure Laterality Date   • LARYNGOSCOPY Bilateral 12/27/2021    Procedure: MICRODIRECT LARYNGOSCOPY WITH/WITHOUT LASER;  Surgeon: Robert Ayers Jr., MD;  Location: [facility];  Service: ENT;  Laterality:  Bilateral;   • PANENDOSCOPY Bilateral 2021    Procedure: DIRECT LARYNGOSCOPY, ESOPHAGOSCOPY, BRONCHOSCOPY;  Surgeon: Robert Ayers Jr., MD;  Location: [facility];  Service: ENT;  Laterality: Bilateral;   • TEETH EXTRACTION     • TONSILLECTOMY     ,   Family History   Problem Relation Age of Onset   • Cancer Mother    • Cancer Paternal Grandmother    ,   Social History     Tobacco Use   • Smoking status: Former Smoker     Types: Cigars     Quit date: 2021     Years since quittin.4   • Smokeless tobacco: Never Used   Vaping Use   • Vaping Use: Never used   Substance Use Topics   • Alcohol use: Yes     Comment: OCC   • Drug use: Never   , (Not in a hospital admission)   and Allergies:  Patient has no known allergies.    Current Outpatient Medications:   •  cetirizine (zyrTEC) 10 MG tablet, Take 10 mg by mouth Daily., Disp: , Rfl:   •  fluticasone (FLONASE) 50 MCG/ACT nasal spray, 2 sprays into the nostril(s) as directed by provider Daily., Disp: , Rfl:   •  Lidocaine Viscous HCl (XYLOCAINE) 2 % solution, Take 5 mL by mouth 4 (Four) Times a Day As Needed for Moderate Pain ., Disp: 100 mL, Rfl: 2  •  ondansetron (Zofran) 8 MG tablet, Take 1 tablet by mouth Every 8 (Eight) Hours As Needed for Nausea or Vomiting., Disp: 90 tablet, Rfl: 2  •  oxyCODONE (ROXICODONE) 5 MG/5ML solution, Take 7.5 mL by mouth Every 4 (Four) Hours As Needed for Moderate Pain . (Patient taking differently: Take  by mouth Every 4 (Four) Hours As Needed for Moderate Pain .), Disp: 150 mL, Rfl: 0  •  polyethylene glycol (MIRALAX) 17 g packet, Take 17 g by mouth Daily., Disp: , Rfl:   •  POTASSIUM CHLORIDE PO, Take  by mouth Every Other Day., Disp: , Rfl:   •  prochlorperazine (COMPAZINE) 10 MG tablet, Take 1 tablet by mouth Every 4 (Four) Hours As Needed for Nausea or Vomiting., Disp: 90 tablet, Rfl: 2  •  fluconazole (DIFLUCAN) 100 MG tablet, Take 1 tablet by mouth Daily., Disp: 8 tablet, Rfl: 0  •  HYDROcodone-acetaminophen  "(NORCO) 5-325 MG per tablet, Take 1 tablet by mouth Every 6 (Six) Hours As Needed for Moderate Pain  or Severe Pain ., Disp: 40 tablet, Rfl: 0  •  ipratropium (ATROVENT) 0.06 % nasal spray, 2 sprays into the nostril(s) as directed by provider 4 (Four) Times a Day As Needed for Rhinitis. For drainage, Disp: 45 mL, Rfl: 3  •  OLANZapine (zyPREXA) 5 MG tablet, 1 tablet po daily for 4 days when he starts chemo., Disp: 30 tablet, Rfl: 1    Objective     Vital Signs   Ht 180.3 cm (71\")   Wt 59 kg (130 lb)   BMI 18.13 kg/m²      Physical Exam:  General appearance - alert, cachexia, and in no distress  Mental status - alert, oriented to person, place, and time  Eyes - sclera anicteric  Chest - no tachypnea, retractions or cyanosis  Heart - regular rate   Abdomen - soft, nontender, nondistended, no masses or organomegaly  Neurological - alert, oriented, normal speech, no focal findings or movement disorder noted  Musculoskeletal - no joint tenderness, deformity or swelling    Results Review:    The following data was reviewed by: Nano Schwab MD on 08/08/2022:  Progress Notes by Robert Ayers Jr., MD (08/03/2022 15:45)  Progress Notes by Kieran Franks APRN (08/03/2022 14:00)  CBC & Differential (06/21/2022 15:09)  Comprehensive Metabolic Panel (06/21/2022 15:09)  I personally discussed the patient with Dr. Ayers.     Assessment & Plan       Diagnoses and all orders for this visit:    1. Oropharyngeal dysphagia (Primary)  Overview:  Added automatically from request for surgery [id number]    Orders:  -     Case Request; Standing  -     COVID PRE-OP / PRE-PROCEDURE SCREENING ORDER (NO ISOLATION) - Swab, Nasopharynx; Future  -     ECG 12 Lead; Future  -     Case Request    2. Severe protein-calorie malnutrition (HCC)  Overview:  Added automatically from request for surgery [id number]    Orders:  -     Case Request; Standing  -     COVID PRE-OP / PRE-PROCEDURE SCREENING ORDER (NO ISOLATION) - Swab, Nasopharynx; " Future  -     ECG 12 Lead; Future  -     Case Request    Other orders  -     Follow Anesthesia Guidelines / Standing Orders; Future  -     Provide NPO Instructions to Patient; Future  -     Chlorhexidine Skin Prep; Future     Mr. 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 He has progressive weight loss now with malnutrition and cachexia. I have recommended PEG placement on Thursday 8/11 with admission post op for home health and tube feeds to be set up. He will need tube feeding for at least 90 days due to his cachexia.     We discussed risks of surgery including bleeding, infection, damage to surrounding structures including the colon, and early dislodgement with need for surgical intervention.     BMI is below normal parameters (malnutrition). Recommendations: treating the underlying disease process and will be seen by nutrition in the hospital and tube feeds will be initiated      Nano Schwab MD  08/08/22  18:18 CDT

## 2022-08-08 NOTE — H&P (VIEW-ONLY)
Office New Patient History and Physical:     Referring Provider: No ref. provider found    Chief Complaint   Patient presents with   • feeding tube       Subjective .     History of present illness:  Enrique Coronado is a 72 y.o. male who presents to discuss feeding tube placement. 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 On follow up with Dr. 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 Dr. Ayers hopes to improve his nutrition prior to selective neck dissection.     He is Not on blood thinners. He has never had abdominal surgery. He is a former smoker. His BMI is 18.     Review of Systems   Constitutional: Positive for appetite change, fatigue and unexpected weight change. Negative for chills, diaphoresis and fever.   HENT: Positive for congestion, postnasal drip and rhinorrhea.    Eyes: Negative for pain, redness and visual disturbance.   Respiratory: Positive for cough. Negative for chest tightness and shortness of breath.    Cardiovascular: Negative for chest pain and palpitations.   Gastrointestinal: Positive for nausea. Negative for abdominal distention, abdominal pain, constipation, diarrhea and vomiting.   Musculoskeletal: Negative for arthralgias, back pain and gait problem.   Skin: Negative for color change.   Neurological: Negative for dizziness, seizures, facial asymmetry and headaches.   Psychiatric/Behavioral: Negative for agitation, behavioral problems and confusion.       History  Past Medical History:   Diagnosis Date   • Cataract     LEFT   • Hearing loss d/t noise     LEFT   • Neoplasm of epiglottis    ,   Past Surgical History:   Procedure Laterality Date   • LARYNGOSCOPY Bilateral 12/27/2021    Procedure: MICRODIRECT LARYNGOSCOPY WITH/WITHOUT LASER;  Surgeon: Robert Ayers Jr., MD;  Location: [facility];  Service: ENT;  Laterality:  Bilateral;   • PANENDOSCOPY Bilateral 2021    Procedure: DIRECT LARYNGOSCOPY, ESOPHAGOSCOPY, BRONCHOSCOPY;  Surgeon: Robert Ayers Jr., MD;  Location: [facility];  Service: ENT;  Laterality: Bilateral;   • TEETH EXTRACTION     • TONSILLECTOMY     ,   Family History   Problem Relation Age of Onset   • Cancer Mother    • Cancer Paternal Grandmother    ,   Social History     Tobacco Use   • Smoking status: Former Smoker     Types: Cigars     Quit date: 2021     Years since quittin.4   • Smokeless tobacco: Never Used   Vaping Use   • Vaping Use: Never used   Substance Use Topics   • Alcohol use: Yes     Comment: OCC   • Drug use: Never   , (Not in a hospital admission)   and Allergies:  Patient has no known allergies.    Current Outpatient Medications:   •  cetirizine (zyrTEC) 10 MG tablet, Take 10 mg by mouth Daily., Disp: , Rfl:   •  fluticasone (FLONASE) 50 MCG/ACT nasal spray, 2 sprays into the nostril(s) as directed by provider Daily., Disp: , Rfl:   •  Lidocaine Viscous HCl (XYLOCAINE) 2 % solution, Take 5 mL by mouth 4 (Four) Times a Day As Needed for Moderate Pain ., Disp: 100 mL, Rfl: 2  •  ondansetron (Zofran) 8 MG tablet, Take 1 tablet by mouth Every 8 (Eight) Hours As Needed for Nausea or Vomiting., Disp: 90 tablet, Rfl: 2  •  oxyCODONE (ROXICODONE) 5 MG/5ML solution, Take 7.5 mL by mouth Every 4 (Four) Hours As Needed for Moderate Pain . (Patient taking differently: Take  by mouth Every 4 (Four) Hours As Needed for Moderate Pain .), Disp: 150 mL, Rfl: 0  •  polyethylene glycol (MIRALAX) 17 g packet, Take 17 g by mouth Daily., Disp: , Rfl:   •  POTASSIUM CHLORIDE PO, Take  by mouth Every Other Day., Disp: , Rfl:   •  prochlorperazine (COMPAZINE) 10 MG tablet, Take 1 tablet by mouth Every 4 (Four) Hours As Needed for Nausea or Vomiting., Disp: 90 tablet, Rfl: 2  •  fluconazole (DIFLUCAN) 100 MG tablet, Take 1 tablet by mouth Daily., Disp: 8 tablet, Rfl: 0  •  HYDROcodone-acetaminophen  "(NORCO) 5-325 MG per tablet, Take 1 tablet by mouth Every 6 (Six) Hours As Needed for Moderate Pain  or Severe Pain ., Disp: 40 tablet, Rfl: 0  •  ipratropium (ATROVENT) 0.06 % nasal spray, 2 sprays into the nostril(s) as directed by provider 4 (Four) Times a Day As Needed for Rhinitis. For drainage, Disp: 45 mL, Rfl: 3  •  OLANZapine (zyPREXA) 5 MG tablet, 1 tablet po daily for 4 days when he starts chemo., Disp: 30 tablet, Rfl: 1    Objective     Vital Signs   Ht 180.3 cm (71\")   Wt 59 kg (130 lb)   BMI 18.13 kg/m²      Physical Exam:  General appearance - alert, cachexia, and in no distress  Mental status - alert, oriented to person, place, and time  Eyes - sclera anicteric  Chest - no tachypnea, retractions or cyanosis  Heart - regular rate   Abdomen - soft, nontender, nondistended, no masses or organomegaly  Neurological - alert, oriented, normal speech, no focal findings or movement disorder noted  Musculoskeletal - no joint tenderness, deformity or swelling    Results Review:    The following data was reviewed by: Nano Schwab MD on 08/08/2022:  Progress Notes by Robert Ayers Jr., MD (08/03/2022 15:45)  Progress Notes by Kieran Franks APRN (08/03/2022 14:00)  CBC & Differential (06/21/2022 15:09)  Comprehensive Metabolic Panel (06/21/2022 15:09)  I personally discussed the patient with Dr. Ayers.     Assessment & Plan       Diagnoses and all orders for this visit:    1. Oropharyngeal dysphagia (Primary)  Overview:  Added automatically from request for surgery [id number]    Orders:  -     Case Request; Standing  -     COVID PRE-OP / PRE-PROCEDURE SCREENING ORDER (NO ISOLATION) - Swab, Nasopharynx; Future  -     ECG 12 Lead; Future  -     Case Request    2. Severe protein-calorie malnutrition (HCC)  Overview:  Added automatically from request for surgery [id number]    Orders:  -     Case Request; Standing  -     COVID PRE-OP / PRE-PROCEDURE SCREENING ORDER (NO ISOLATION) - Swab, Nasopharynx; " Future  -     ECG 12 Lead; Future  -     Case Request    Other orders  -     Follow Anesthesia Guidelines / Standing Orders; Future  -     Provide NPO Instructions to Patient; Future  -     Chlorhexidine Skin Prep; Future     Mr. 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 He has progressive weight loss now with malnutrition and cachexia. I have recommended PEG placement on Thursday 8/11 with admission post op for home health and tube feeds to be set up. He will need tube feeding for at least 90 days due to his cachexia.     We discussed risks of surgery including bleeding, infection, damage to surrounding structures including the colon, and early dislodgement with need for surgical intervention.     BMI is below normal parameters (malnutrition). Recommendations: treating the underlying disease process and will be seen by nutrition in the hospital and tube feeds will be initiated      Nano Schwab MD  08/08/22  18:18 CDT

## 2022-08-11 NOTE — ANESTHESIA PROCEDURE NOTES
Airway  Urgency: elective    Difficult airway    General Information and Staff    Patient location during procedure: OR  CRNA/CAA: Orlando Pretty CRNA    Indications and Patient Condition  Indications for airway management: airway protection    Preoxygenated: yes  MILS maintained throughout  Mask difficulty assessment: 1 - vent by mask    Final Airway Details  Final airway type: endotracheal airway      Successful airway: ETT  Cuffed: yes   Successful intubation technique: direct laryngoscopy  Facilitating devices/methods: intubating stylet  Endotracheal tube insertion site: oral  Blade: Cisneros  Blade size: 4  ETT size (mm): 7.5  Cormack-Lehane Classification: grade IIa - partial view of glottis  Placement verified by: chest auscultation and capnometry   Cuff volume (mL): 8  Measured from: lips  ETT/EBT  to lips (cm): 21  Number of attempts at approach: 1  Assessment: lips, teeth, and gum same as pre-op and atraumatic intubation    Additional Comments  Very swollen airway structures. Suspected mass.  Small sliver of vocal chords noted and ett passed atraumatically

## 2022-08-11 NOTE — PLAN OF CARE
Goal Outcome Evaluation:  Plan of Care Reviewed With: other (see comments), patient (RN)        Progress: no change  Outcome Evaluation: Initial nutrition assessment. Nutrition consult to start enteral nutrition s/p PEG placement. See RN progress note for full details.

## 2022-08-11 NOTE — PLAN OF CARE
Goal Outcome Evaluation:  Plan of Care Reviewed With: patient  G tube placed today. Tube feeds started at 15ml/h, patient tolerating. Complained of headache, meds given per tube. IVF infusing, safety maintained.

## 2022-08-11 NOTE — THERAPY EVALUATION
Acute Care - Speech Language Pathology   Swallow Initial Evaluation [facility]     Patient Name: Enrique Coronado  : 1950  MRN: [id number]  Today's Date: 2022               Admit Date: 2022  Clinical bedside swallow evaluation complete. The pt is alert and oriented x4. He is noted to have a hoarse vocal quality as well as weak non-productive cough. 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 Pt completed trials of pureed, honey, and thin consistencies. Pt noted to have decreased laryngeal movement with palpation during swallow completion as well as 2-4 multiple swallows per trial. Pt with delayed coughing with pureed and honey with questionable wet vocal quality. Immediate cough noted with thin liquids with definite wet vocal quality. SLP cannot ensure safety with PO intake at this time. The pt should remain NPO with meds administered via alternate route. Ok for occasional ice chip post oral care. VFSS ordered for  to objectively assess for safety with PO intake.  Esteban Chapman MS CCC-SLP 2022 15:50 CDT    Visit Dx:     ICD-10-CM ICD-9-CM   1. Oropharyngeal dysphagia  R13.12 787.22   2. Severe protein-calorie malnutrition (HCC)  E43 262     Patient Active Problem List   Diagnosis   • Squamous cell cancer of epiglottis (HCC)   • Mass of left side of neck   • Former smoker   • History of radiation therapy   • Oropharyngeal dysphagia   • Severe protein-calorie malnutrition (HCC)     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   • Neoplasm of epiglottis    • Persistent headaches     d/t radiation   • SOB (shortness of breath) on exertion    • Tinnitus      Past Surgical History:   Procedure Laterality Date   • EYE SURGERY Left     steel  removed   • LARYNGOSCOPY Bilateral 12/27/2021    Procedure: MICRODIRECT LARYNGOSCOPY WITH/WITHOUT LASER;  Surgeon: Robert Ayers Jr., MD;  Location: [facility] OR;  Service: ENT;  Laterality: Bilateral;   • PANENDOSCOPY Bilateral 12/27/2021    Procedure: DIRECT LARYNGOSCOPY, ESOPHAGOSCOPY, BRONCHOSCOPY;  Surgeon: Robert Ayers Jr., MD;  Location: [facility] OR;  Service: ENT;  Laterality: Bilateral;   • TEETH EXTRACTION     • TONSILLECTOMY         SLP Recommendation and Plan  SLP Swallowing Diagnosis: severe, oral dysphagia, suspected pharyngeal dysphagia, R/O pharyngeal dysphagia (08/11/22 1409)  SLP Diet Recommendation: NPO (08/11/22 1409)  Recommended Precautions and Strategies: upright posture during/after eating, small bites of food and sips of liquid (08/11/22 1409)  SLP Rec. for Method of Medication Administration: meds via alternate route (08/11/22 1409)     Monitor for Signs of Aspiration: yes, notify SLP if any concerns (08/11/22 1409)  Recommended Diagnostics: VFSS (MBS) (08/11/22 1409)  Swallow Criteria for Skilled Therapeutic Interventions Met: demonstrates skilled criteria (08/11/22 1409)  Anticipated Discharge Disposition (SLP): unknown (08/11/22 1409)  Rehab Potential/Prognosis, Swallowing: adequate, monitor progress closely (08/11/22 1409)  Therapy Frequency (Swallow): at least, 2 days per week (08/11/22 1409)  Predicted Duration Therapy Intervention (Days): until discharge (08/11/22 1409)                                         SWALLOW EVALUATION (last 72 hours)     SLP Adult Swallow Evaluation     Row Name 08/11/22 1409                   Rehab Evaluation    Document Type evaluation  -CS        Subjective Information no complaints  -CS        Patient Observations alert;cooperative;agree to therapy  -CS        Patient Effort good  -CS                  General Information    Patient Profile Reviewed yes  -CS        Pertinent History Of Current Problem Hx of head and neck cancer, s/p  radiation, PEG tube place on 8/11  -CS        Current Method of Nutrition NPO;gastrostomy feedings  -CS        Precautions/Limitations, Vision WFL with corrective lenses  -CS        Precautions/Limitations, Hearing WFL  -CS        Prior Level of Function-Communication WFL  -CS        Prior Level of Function-Swallowing no diet consistency restrictions  -CS        Plans/Goals Discussed with patient  -CS        Barriers to Rehab medically complex  -CS        Patient's Goals for Discharge patient did not state  -CS                  Pain    Additional Documentation Pain Scale: FACES Pre/Post-Treatment (Group)  -CS                  Pain Scale: FACES Pre/Post-Treatment    Pain: FACES Scale, Pretreatment 0-->no hurt  -CS        Posttreatment Pain Rating 0-->no hurt  -CS                  Oral Motor Structure and Function    Dentition Assessment edentulous  -CS        Secretion Management WNL/WFL  -CS        Mucosal Quality moist, healthy  -CS        Volitional Swallow WFL  -CS        Volitional Cough WFL  -CS                  Oral Musculature and Cranial Nerve Assessment    Oral Motor General Assessment generalized oral motor weakness  -CS                  General Eating/Swallowing Observations    Positioning During Eating upright in bed  -CS        Utensils Used spoon;straw  -CS        Consistencies Trialed pureed;honey-thick liquids;thin liquids  -CS                  Clinical Swallow Eval    Oral Prep Phase WFL  -CS        Oral Transit impaired  -CS        Oral Residue WFL  -CS        Pharyngeal Phase suspected pharyngeal impairment  -CS        Esophageal Phase unremarkable  -CS        Clinical Swallow Evaluation Summary See note  -CS                  Oral Transit Concerns    Oral Transit Concerns increased oral transit time  -CS        Increased Oral Transit Time pudding  -CS                  SLP Evaluation Clinical Impression    SLP Swallowing Diagnosis severe;oral dysphagia;suspected pharyngeal dysphagia;R/O pharyngeal  dysphagia  -CS        Functional Impact risk of aspiration/pneumonia  -CS        Rehab Potential/Prognosis, Swallowing adequate, monitor progress closely  -CS        Swallow Criteria for Skilled Therapeutic Interventions Met demonstrates skilled criteria  -CS                  Recommendations    Therapy Frequency (Swallow) at least;2 days per week  -CS        Predicted Duration Therapy Intervention (Days) until discharge  -CS        SLP Diet Recommendation NPO  -CS        Recommended Diagnostics VFSS (MBS)  -CS        Recommended Precautions and Strategies upright posture during/after eating;small bites of food and sips of liquid  -CS        Oral Care Recommendations Oral Care BID/PRN  -CS        SLP Rec. for Method of Medication Administration meds via alternate route  -CS        Monitor for Signs of Aspiration yes;notify SLP if any concerns  -CS        Anticipated Discharge Disposition (SLP) unknown  -CS                  Swallow Goals (SLP)    Swallow LTGs Patient will demonstrate functional swallow for  -CS        Swallow STGs diet tolerance goal selection (SLP)  -CS        Diet Tolerance Goal Selection (SLP) Patient will tolerate trials of  -CS                  (LTG) Patient will demonstrate functional swallow for    Diet Texture (Demonstrate functional swallow) soft ground textures  -CS        Liquid viscosity (Demonstrate functional swallow) thin liquids  -CS        Dent (Demonstrate functional swallow) with minimal cues (75-90% accuracy)  -CS        Time Frame (Demonstrate functional swallow) by discharge  -CS        Barriers (Demonstrate functional swallow) n/a  -CS        Progress/Outcomes (Demonstrate functional swallow) goal ongoing  -CS                  (STG) Patient will tolerate trials of    Consistencies Trialed (Tolerate trials) pureed/ mashed textures;honey/ moderately thick liquids;nectar/ mildly thick liquids;thin liquids;soft ground textures  -CS        Desired Outcome (Tolerate trials)  without signs/symptoms of aspiration  -CS        Chambers (Tolerate trials) with minimal cues (75-90% accuracy)  -CS        Progress/Outcomes (Tolerate trials) new goal  -CS              User Key  (r) = Recorded By, (t) = Taken By, (c) = Cosigned By    Initials Name Effective Dates    CS Esteban Chapman MS CCC-SLP 06/16/21 -                 EDUCATION  The patient has been educated in the following areas:   Dysphagia (Swallowing Impairment).        SLP GOALS     Row Name 08/11/22 1409             (LTG) Patient will demonstrate functional swallow for    Diet Texture (Demonstrate functional swallow) soft ground textures  -CS      Liquid viscosity (Demonstrate functional swallow) thin liquids  -CS      Chambers (Demonstrate functional swallow) with minimal cues (75-90% accuracy)  -CS      Time Frame (Demonstrate functional swallow) by discharge  -CS      Barriers (Demonstrate functional swallow) n/a  -CS      Progress/Outcomes (Demonstrate functional swallow) goal ongoing  -CS              (STG) Patient will tolerate trials of    Consistencies Trialed (Tolerate trials) pureed/ mashed textures;honey/ moderately thick liquids;nectar/ mildly thick liquids;thin liquids;soft ground textures  -CS      Desired Outcome (Tolerate trials) without signs/symptoms of aspiration  -CS      Chambers (Tolerate trials) with minimal cues (75-90% accuracy)  -CS      Progress/Outcomes (Tolerate trials) new goal  -CS            User Key  (r) = Recorded By, (t) = Taken By, (c) = Cosigned By    Initials Name Provider Type    CS Esteban Chapman MS CCC-SLP Speech and Language Pathologist                   Time Calculation:    Time Calculation- SLP     Row Name 08/11/22 1543             Time Calculation- SLP    SLP Start Time 1409  -      SLP Stop Time 1520  -      SLP Time Calculation (min) 71 min  -CS      SLP Received On 08/11/22  -      SLP Goal Re-Cert Due Date 08/21/22  -              Untimed Charges    SLP Eval/Re-eval   ST Eval Oral Pharyng Swallow - 79639  -CS      [id number] Eval Oral Pharyng Swallow Minutes 71  -CS              Total Minutes    Untimed Charges Total Minutes 71  -CS       Total Minutes 71  -CS            User Key  (r) = Recorded By, (t) = Taken By, (c) = Cosigned By    Initials Name Provider Type    CS Esteban Chapman, MS CCC-SLP Speech and Language Pathologist                Therapy Charges for Today     Code Description Service Date Service Provider Modifiers Qty    79609205112  ST EVAL ORAL PHARYNG SWALLOW 5 8/11/2022 Esteban Chapman, MS CCC-SLP GN 1               Esteban Chapman MS CCC-SLP  8/11/2022

## 2022-08-11 NOTE — OP NOTE
Percutaneous Gastrostomy Insertion Operative Report:     Preoperative diagnosis:   Oropharyngeal dysphagia.  Postoperative diagnosis:   Oropharyngeal dysphagia.  Procedure:   EGD with Percutaneous Endoscopic Gastrostomy Placement   Attending surgeon: Nano Schwab MD   Anesthesia: MAC converted to general  Specimens: none  Blood loss: 5mL   Drains: 20 Niuean pull PEG.  Indications: Enrique Coronado is a 72 y.o. year old man with squamous cell carcinoma of the head and neck now with cachexia 2/2 oropharyngeal dysphagia. He has progressive weight loss now with malnutrition and cachexia. I have recommended PEG placement on Thursday 8/11 with admission post op for home health and tube feeds to be set up. He will need tube feeding for at least 90 days due to his cachexia.    We discussed risks of surgery including bleeding, infection, damage to surrounding structures including the colon, and early dislodgement with need for surgical intervention.     Description of procedure: Consent was obtained. The patient was transferred to the OR and MAC anesthesia was induced. A surgical timeout was performed. The flexible endoscope was inserted into the mouth through the bite-block. The patient did not tolerate this with severe coughing and desaturations. General anesthesia was induced and an ETT was placed. The scope was then re-inserted.     The esophagus was examined upon advancement of the scope. It was normal. There was no hiatal hernia. The stomach was normal.  The stomach was fully insufflated. A suitable location for placement of the PEG tube was identified by transillumination with the endoscopic light source and careful ballottement of the abdominal wall.  The skin was prepped with chloraprep and draped in sterile fashion. The skin was infiltrated with 1% lidocaine. The lidocaine needle was inserted into the stomach, aspirating the whole time and no air was aspirated until the stomach was entered. An incision was made.  An 18 gauge needle with the introducer was passed into the stomach. A guidewire was passed.  It was grasped with the endoscopic snare.  The guidewire, snare and endoscope were withdrawn through the patient's mouth.  A 20 Barbadian pull style PEG tube was advanced over the guidewire.  It was seated at 2 cm from the bumper according to the markings on the tube. The PEG was examined with the endoscope inside the stomach; hemostasis was confirmed, it was in good position. The retention flange and feeding tube adapter were attached. The PEG was adhered to the skin with tape and a mesentery. An abdominal binder was placed.     The patient tolerated the procedure very well and was transferred to the recovery area in stable condition.    Nano Schwab MD  08/11/22

## 2022-08-11 NOTE — DISCHARGE PLACEMENT REQUEST
"RamanEnrique hammer (72 y.o. Male)             Date of Birth   1950    Social Security Number       Address   [address] [place] [address]    Home Phone   [phone]    MRN   [id number]       [facility]    Marital Status   Single                            Admission Date   8/11/22    Admission Type   Elective    Admitting Provider   Nano Schwab MD    Attending Provider   Nano Schwab MD    Department, Room/Bed   [facility] 3C, 371/1       Discharge Date       Discharge Disposition       Discharge Destination                               Attending Provider: Nano Schwab MD    Allergies: No Known Allergies    Isolation: None   Infection: COVID (History) (08/11/22)   Code Status: CPR   Advance Care Planning Activity    Ht: 172 cm (67.72\")   Wt: 58.1 kg (128 lb 1.4 oz)    Admission Cmt: None   Principal Problem: None                Active Insurance as of 8/11/2022     Primary Coverage     Payor Plan Insurance Group Employer/Plan Group    MEDICARE MEDICARE A & B      Payor Plan Address Payor Plan Phone Number Payor Plan Fax Number Effective Dates    [address] [phone]  4/1/2017 - None Entered    [facility] [address]       Subscriber Name Subscriber Birth Date Member ID       ENRIQUE RAMAN 1950 [id number]           Secondary Coverage     Payor Plan Insurance Group Employer/Plan Group    [employer] FID[employer]ITY PLAN N     Payor Plan Address Payor Plan Phone Number Payor Plan Fax Number Effective Dates    [address] [phone]  4/1/2017 - None Entered    [facility] [phone]       Subscriber Name Subscriber Birth Date Member ID       ENRIQUE RAMAN 1950 [id number]                 Emergency Contacts      (Rel.) Home Phone Work Phone Mobile Phone    CARLINE ARAYA (Daughter) -- -- [phone]            "

## 2022-08-11 NOTE — ANESTHESIA POSTPROCEDURE EVALUATION
"Patient: Enrique Coronado    Procedure Summary     Date: 08/11/22 Room / Location:  [facility] OR 06 /  [facility]    Anesthesia Start: 1018 Anesthesia Stop: 1056    Procedure: ESOPHAGOGASTRODUODENOSCOPY WITH PERCUTANEOUS ENDOSCOPIC GASTROSTOMY TUBE INSERTION WITH ANESTHESIA (N/A Abdomen) Diagnosis:       Oropharyngeal dysphagia      Severe protein-calorie malnutrition (HCC)      (Oropharyngeal dysphagia [R13.12])      (Severe protein-calorie malnutrition (HCC) [E43])    Surgeons: Nano Schwab MD Provider: Orlando Pretty CRNA    Anesthesia Type: MAC ASA Status: 3          Anesthesia Type: MAC    Vitals  Vitals Value Taken Time   /69 08/11/22 1125   Temp 97.4 °F (36.3 °C) 08/11/22 1125   Pulse 0 08/11/22 1129   Resp 18 08/11/22 1125   SpO2 98 % 08/11/22 1129   Vitals shown include unvalidated device data.        Post Anesthesia Care and Evaluation    Patient location during evaluation: PACU  Patient participation: complete - patient participated  Level of consciousness: awake and alert  Pain management: adequate    Airway patency: patent  Anesthetic complications: No anesthetic complications    Cardiovascular status: acceptable  Respiratory status: acceptable  Hydration status: acceptable    Comments: Blood pressure 127/69, pulse 78, temperature 97.4 °F (36.3 °C), temperature source Temporal, resp. rate 18, height 172 cm (67.72\"), weight 58.1 kg (128 lb 1.4 oz), SpO2 100 %.    Pt discharged from PACU based on linda score >8      "

## 2022-08-11 NOTE — PROGRESS NOTES
"Adult Nutrition  Assessment/PES    Patient Name:  Enrique Coronado  YOB: 1950  MRN: [id number]  Admit Date:  8/11/2022    Assessment Date:  8/11/2022    Comments:  At 1800 begin PEG tube feeding of Nutren 2.0 at 15mL/hour. Increase rate by 10mL every 12 hours as tolerated to a goal rate of 45mL/hour. Flush PEG tube with 55mL of water every hour via pump for hydration.     Once pt is tolerating continuous TF at goal rate, he may transition to bolus feeds as desired. He will require Nutren 2.0 (or 2-tony), 250mL, 4x/day. With bolus feeds, recommend water flushes of 75mL before and after each bolus administration with an additional 240mL of water (8oz.) flushed TID between feedings for hydration.     Pt will require enteral nutrition for >90 days d/t inability to tolerate a po diet to maintain nutrition status r/t oropharyngeal dysphagia.       Reason for Assessment     Row Name 08/11/22 1213          Reason for Assessment    Reason For Assessment physician consult;TF/PN     Diagnosis cancer diagnosis/related complications     Identified At Risk by Screening Criteria difficulty chewing/swallowing                Nutrition/Diet History     Row Name 08/11/22 1214          Nutrition/Diet History    Typical Intake (Food/Fluid/EN/PN) Pt is s/p EGD with PEG placement. He has dx of severe malnutrition secondary to oropharyngeal dysphagia. 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 He needs his nutrition to be improved prior to selective neck dissection. Dr. Schwab requests TF to start slowly at trophic rate and increase slowly d/t risk for refeeding syndrome. Once he is tolerating TF at goal rate, he can transition to bolus feeds.     Factors Affecting Nutritional Intake difficulty/impaired swallowing                Anthropometrics     Row Name 08/11/22 1217 08/11/22 0828       Anthropometrics    Height -- 172 cm (67.72\")    Weight -- 58.1 kg (128 lb 1.4 oz)    Weight for Calculation 58.1 kg (128 lb 1.4 " "oz) --    Additional Documentation Usual Body Weight (UBW) (Group) --       Usual Body Weight (UBW)    Weight Change (Amount and Duration) down 4# in less than 1 month, down 24# in 6 months (16%) --               Labs/Tests/Procedures/Meds     Row Name 08/11/22 1216          Labs/Procedures/Meds    Lab Results Reviewed reviewed, pertinent     Lab Results Comments HH            Diagnostic Tests/Procedures    Diagnostic Test/Procedure Reviewed reviewed, pertinent     Diagnostic Test/Procedures Comments 8/11 EGD with PEG tube placement            Medications    Pertinent Medications Reviewed reviewed, pertinent     Pertinent Medications Comments colace                Physical Findings     Row Name 08/11/22 1217          Physical Findings    Overall Physical Appearance cachexia, L upper abd PEG tube site     Enteral Access Devices PEG                Estimated/Assessed Needs - Anthropometrics     Row Name 08/11/22 1217 08/11/22 0828       Anthropometrics    Height -- 172 cm (67.72\")    Weight -- 58.1 kg (128 lb 1.4 oz)    Weight for Calculation 58.1 kg (128 lb 1.4 oz) --    Additional Documentation Usual Body Weight (UBW) (Group) --       Usual Body Weight (UBW)    Weight Change (Amount and Duration) down 4# in less than 1 month, down 24# in 6 months (16%) --       Estimated/Assessed Needs    Additional Documentation KCAL/KG (Group);Protein Requirements (Group);Fluid Requirements (Group) --       KCAL/KG    KCAL/KG 30 Kcal/Kg (kcal);35 Kcal/Kg (kcal) --    30 Kcal/Kg (kcal) 1743 --    35 Kcal/Kg (kcal) 2033.5 --       Protein Requirements    Weight Used For Protein Calculations 58.1 kg (128 lb 1.4 oz) --    Est Protein Requirement Amount (gms/kg) 1.5 gm protein --    Estimated Protein Requirements (gms/day) 87.15 --       Fluid Requirements    Fluid Requirements (mL/day) 1743 --    Estimated Fluid Requirement Method other (see comments)  30mL/kg --    RDA Method (mL) 1743 --               Nutrition Prescription Ordered  "    Row Name 08/11/22 1219          Nutrition Prescription PO    Current PO Diet NPO                Evaluation of Received Nutrient/Fluid Intake     Row Name 08/11/22 1219          Nutrient/Fluid Evaluation    Number of Days Evaluated 1 day     Additional Documentation Fluid Intake Evaluation (Group)            Fluid Intake Evaluation    Other Fluid (mL) 50                  Malnutrition Severity Assessment     Row Name 08/11/22 1248          Malnutrition Severity Assessment    Malnutrition Type Chronic Disease - Related Malnutrition            Insufficient Energy Intake     Insufficient Energy Intake Findings Severe  oropharyngeal dysphagia secondary to head/neck CA     Insufficient Energy Intake  <75% of est. energy requirement for > or equal to 1 month            Unintentional Weight Loss     Unintentional Weight Loss Findings Severe  16% weight loss in 6 months     Unintentional Weight Loss  Weight loss greater than 10% in six months            Declining Functional Status    Declining Functional Status Findings Measurably Reduced            Criteria Met (Must meet criteria for severity in at least 2 of these categories: M Wasting, Fat Loss, Fluid, Secondary Signs, Wt. Status, Intake)    Patient meets criteria for  Severe Malnutrition                 Problem/Interventions:   Problem 1     Row Name 08/11/22 1220          Nutrition Diagnoses Problem 1    Problem 1 Malnutrition     Etiology (related to) Medical Diagnosis;Functional Diagnosis     Nutrition related Alternate nutrition requirements;Increased nutrition needs  will require alternate means of nutrition support for >90 days d/t catabolic illness and inability to tolerate a po diet     Oncology Head/neck cancer     Functional Diagnosis Dysphagia     Signs/Symptoms (evidenced by) NPO;PO diet not tolerated;Unintended Weight Change;% IBW;Report/Observation     Percent (%) IBW 83 %     Unintended Weight Change Loss     Number of Pounds Lost 24#     Weight loss  time period 6 months (16%)     Other Comment cachexia, severe muscle and fat wasting                      Intervention Goal     Row Name 08/11/22 1223          Intervention Goal    General Nutrition support treatment;Reduce/improve symptoms;Meet nutritional needs for age/condition;Disease management/therapy     TF/PN Inititiate TF/PN;Establish TF tolerance;Deliver (%) goal;Tolerate TF at goal     Deliver % of Goal 75 %  or >     Weight Appropriate weight gain                Nutrition Intervention     Row Name 08/11/22 1223          Nutrition Intervention    RD/Tech Action Follow Tx progress;Care plan reviewd;Recommend/ordered     Recommended/Ordered EN                Nutrition Prescription     Row Name 08/11/22 1224          Nutrition Prescription EN    Enteral Prescription Enteral begin/change;Enteral to supply     Enteral Route PEG     Product Nutren 2 tony     TF Delivery Method Continuous     Continuous TF Goal Rate (mL/hr) 45 mL/hr     Continuous TF Starting Rate (mL/hr) 15 mL/hr  increase by 10mL every 12 hours as tolerated to goal rate     Continuous TF Goal Volume (mL) 990 mL     Continuous TF Starting Volume (mL) 180 mL  in the first 12 hours     Water flush (mL)  55 mL     Water Flush Frequency Per hour     New EN Prescription Ordered? Yes            EN to Supply    Kcal/Day 1980 Kcal/Day     Kcal/Kg 34 Kcal/Kg     Kcal/Kg Weight Method Actual weight     Protein (gm/day) 83 gm/day     Meet Estimated Kcal Need (%) 100 %  of est'd calorie range     Meet Estimated Protein Need (%) 96 %     TF Free H2O (mL) 685 mL     Total Free H2O (mL/day) 1895 mL/day     Fiber Per Day (gm/day) 0 gm/day            Other Orders    Obtain Weight Daily     Obtain Weight Ordered? Yes     Other Per MD, due to the risk of refeeding syndrome, will start at a low rate and increase to goal rate as tolerated slowly. Once pt is at goal rate and as long as he is tolerating TF with no issues, he may transition to bolus feeds. If/when  bolus feeds desired, recommend: Nutren 2.0 (2-tony), 250mL, 4x/day. Water flushes of 75mL before and after each bolus feed with 240mL (8oz.) TID between bolus feeds for hydration needs.                   Electronically signed by:  Jacquelin Boo RDN, LD  08/11/22 13:04 CDT

## 2022-08-11 NOTE — ANESTHESIA PREPROCEDURE EVALUATION
Anesthesia Evaluation     Patient summary reviewed   no history of anesthetic complications:  NPO Solid Status: > 8 hours             Airway   Mallampati: I  Dental    (+) edentulous    Pulmonary    (+) a smoker Former,   (-) COPD, asthma, sleep apnea  Cardiovascular   Exercise tolerance: good (4-7 METS)    (-) pacemaker, past MI, angina, cardiac stents      Neuro/Psych  (-) seizures, TIA, CVA  GI/Hepatic/Renal/Endo    (-) GERD, liver disease, no renal disease, diabetes    Musculoskeletal     Abdominal    Substance History      OB/GYN          Other      history of cancer (ENT)                    Anesthesia Plan    ASA 3     MAC     intravenous induction     Anesthetic plan, risks, benefits, and alternatives have been provided, discussed and informed consent has been obtained with: patient.        CODE STATUS:

## 2022-08-11 NOTE — CASE MANAGEMENT/SOCIAL WORK
Continued Stay Note   [place]     Patient Name: Enrique Coronado  MRN: [id number]  Today's Date: 8/11/2022    Admit Date: 8/11/2022     Discharge Plan     Row Name 08/11/22 1426       Plan    Plan Comments Pt was sleeping soundly and had to leave a message for his daughter [phone]. Pt will need TF so did go ahead and send the info to [facility].               Discharge Codes    No documentation.                     IRINEO Manley

## 2022-08-11 NOTE — PLAN OF CARE
Clinical bedside swallow evaluation complete. The pt is alert and oriented x4. He is noted to have a hoarse vocal quality as well as weak non-productive cough. 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 Pt completed trials of pureed, honey, and thin consistencies. Pt noted to have decreased laryngeal movement with palpation during swallow completion as well as 2-4 multiple swallows per trial. Pt with delayed coughing with pureed and honey with questionable wet vocal quality. Immediate cough noted with thin liquids with definite wet vocal quality. SLP cannot ensure safety with PO intake at this time. The pt should remain NPO with meds administered via alternate route. Ok for occasional ice chip post oral care. VFSS ordered for 8/12 to objectively assess for safety with PO intake.

## 2022-08-12 NOTE — PLAN OF CARE
Problem: Adult Inpatient Plan of Care  Goal: Plan of Care Review  Outcome: Ongoing, Progressing  Flowsheets (Taken 8/12/2022 0840)  Progress: (Eval) --  Plan of Care Reviewed With: patient  Outcome Evaluation: VFSS completed in Radiology this AM. Pt was alert, cooperative, reported that he had been consuming pureed diet consistency with regular/thin liquid consistency in addition to naturally thickened nutritional drinks 4-5x/day prior to acute admission, yet now s/p PEG placement secondary to significant weight loss. He reported that with pureed intake at home, he would often have to stop PO intake after a few bites, as food particles were able to be expelled from the nares. He reported ongoing oral pain, stated he has attempted the miracle mouthwash rinse as per instructed during txs, yet stated this has not helped, though he reported he has been able to seek at least some oral pain relief by utilizing chloraseptic spray prior to going to bed at night, as he reported oral pain often otherwise keeps him awake. He reported frequent L headache, currently rated at 6/10 on pain scale. Oral mech exam revealed edentulous state, stated dentures are ill-fitting secondary to significant weight loss. 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 Pt was presented the following consistencies in the stated order: honey thick and nectar thick (each via spoon). Pt was noted to have rather poor bolus formation, as well as difficulty with lingual anterior to posterior movement of the bolus evidenced by lingual rocking and lingual pumping. He exhibited reduced base of tongue strength which resulted in premature loss of the bolus into the laryngeal vestibule prior to the swallow with each consistencies, which resulted in lola aspiration down the anterior tracheal wall with each trial. He was noted to have observed coughing, which was also cued per SLP, yet he was unsuccessful in clearing the aspirated  material at that time. It must be noted that weak anterior hyolaryngeal excursion was observed, no epiglottic inversion, poor pharyngeal contraction, difficulty of bolus passage into the esophagus with poor upper esophageal sphincter opening, appeared to have edematous aryteniods, also likely impacting ability to pass bolus material into the esophagus. Bulbus like protrusion was also observed in the supraglottic region on the anterior wall of the laryngeal vestibule. This concern was reported to Dr. Bird's APRN, who reported plan to discuss with Dr. Bird. The pt was educated on the observation of significant aspiration observed with each presented consistency, that he is at a high risk for aspiration with any PO, and should remain NPO except for the occasional ice chip (as in one every 15-30, following aspiration precautions, for pleasure/comfort purposes) without increased lung congestion. He was educated on SLP belief that he would benefit from NMES tx for dysphagia at this time, yet recent documentation appears to suggest possible CA recurrence as per Dr. Ayers's observations. If this is, in fact, true, NMES tx would be contraindicated at this time.   RECOMMENDATIONS: NPO except for the occasional ice chip (as in one every 15-30, following aspiration precautions, for pleasure/comfort purposes) without increased lung congestion; Continue with nutrition via PEG; Aspiration precautions considering tube feeds and above mentioned ice chip allowance; RN to monitor for increased lung congestion; Oral care per nursing staff via oral care kits at least 4x/day and before pt initiates PO ice chip intake each day; D/C ice chip allowance if pt is unable to tolerate his secretions and/or if increased lung congestion arises; Outpatient PT services for lymphedema assessment and tx and outpatient SLP consult for dysphagia to be ordered per MD if not felt contraindicated given possible CA recurrence. ST to continue to  follow and tx for dysphagia.

## 2022-08-12 NOTE — CASE MANAGEMENT/SOCIAL WORK
Continued Stay Note   [place]     Patient Name: Enrique Coronado  MRN: [id number]  Today's Date: 8/12/2022    Admit Date: 8/11/2022     Discharge Plan     Row Name 08/12/22 1627       Plan    Plan Home    Patient/Family in Agreement with Plan yes    Plan Comments [facility] will deliver the TF to the pt's home tomorrow between 12-2 pm.               Discharge Codes    No documentation.               Expected Discharge Date and Time     Expected Discharge Date Expected Discharge Time    Aug 13, 2022             IRINEO Manley

## 2022-08-12 NOTE — NURSING NOTE
Bolus teaching completed. Patient able to independently disconnect and flush water through tube. Discussed bolus feeding schedule, flushing before and after, free water flushes and medication administration via tube. All questions answered, good teach back.

## 2022-08-12 NOTE — PROGRESS NOTES
Nano Schwab MD - General Surgery  Progress Note     LOS: 0 days   Patient Care Team:  Odessa Graham MD as PCP - General (Family Medicine)  Nano Schwab MD as Consulting Physician (General Surgery)  Jacquelin Boo as Dietitian (Nutrition)      Subjective     Interval History:      S/p PEG placement yesterday. On TF at 25ml/hour now. Going slow with advancement due to concern for refeeding syndrome. Tolerating tube feeds well - no nausea nor vomiting. Abdominal pain from PEG mild and well controlled.  Seen by speech yesterday- needs to be NPO with occasional ice chips.     Objective     Vital Signs  Temp:  [97.4 °F (36.3 °C)-98.2 °F (36.8 °C)] 97.6 °F (36.4 °C)  Heart Rate:  [52-82] 66  Resp:  [16-20] 16  BP: (105-139)/(52-72) 105/65    Physical Exam:  General appearance - alert, well appearing, and in no distress  Mental status - alert, oriented to person, place, and time  Eyes - sclera anicteric  Chest - no tachypnea, retractions or cyanosis  Heart - regular rate   Abdomen - soft, appropriately tender, PEG in good position.       Results Review:    Lab Results (last 24 hours)     Procedure Component Value Units Date/Time    Comprehensive Metabolic Panel [[id number]]  (Abnormal) Collected: 08/12/22 0510    Specimen: Blood Updated: 08/12/22 0621     Glucose 123 mg/dL      BUN 20 mg/dL      Creatinine 0.61 mg/dL      Sodium 138 mmol/L      Potassium 4.3 mmol/L      Chloride 101 mmol/L      CO2 32.0 mmol/L      Calcium 8.7 mg/dL      Total Protein 6.7 g/dL      Albumin 3.10 g/dL      ALT (SGPT) 7 U/L      AST (SGOT) 12 U/L      Alkaline Phosphatase 68 U/L      Total Bilirubin 0.4 mg/dL      Globulin 3.6 gm/dL      A/G Ratio 0.9 g/dL      BUN/Creatinine Ratio 32.8     Anion Gap 5.0 mmol/L      eGFR 103.3 mL/min/1.73      Comment: National Kidney Foundation and American Society of Nephrology (ASN) Task Force recommended calculation based on the Chronic Kidney Disease Epidemiology Collaboration  (CKD-EPI) equation refit without adjustment for race.       Narrative:      GFR Normal >60  Chronic Kidney Disease <60  Kidney Failure <15      Phosphorus [[id number]]  (Normal) Collected: 08/12/22 0510    Specimen: Blood Updated: 08/12/22 0621     Phosphorus 3.8 mg/dL     Magnesium [[id number]]  (Normal) Collected: 08/12/22 0510    Specimen: Blood Updated: 08/12/22 0621     Magnesium 2.0 mg/dL         Imaging Results (Last 24 Hours)     Procedure Component Value Units Date/Time    FL Video Swallow With Speech Single Contrast [[id number]] Collected: 08/12/22 0948     Updated: 08/12/22 1005    Narrative:      EXAMINATION: FL VIDEO SWALLOW W SPEECH SINGLE-CONTRAST-     8/12/2022 8:40 AM CDT     HISTORY: Dysphagia; R13.12-Dysphagia, oropharyngeal phase;  E43-Unspecified severe protein-calorie malnutrition     The fluoroscopy is performed during ingestion of honey consistency and  nectar consistency contrast boluses.     There is no previous study for comparison.     The study was performed under supervision of speech therapist.     There is evidence of laryngeal penetration and aspiration with nectar  and honey consistency contrast boluses. There is significant cough.       Impression:      1. Abnormal swallowing.  2. Fluoroscopy time: 52 seconds.  3. The dose: 1.64 mGy.  4. Number of images: 1  This report was finalized on 08/12/2022 10:02 by Dr. Jordan Marquez MD.            Assessment & Plan       Mr. Coronado is s/p PEG placement 8/11/22. Seen by speech yesterday - needs to remain NPO with occasional ice chips. Continue to slowly advance tube feeds. Electrolytes reviewed today. Will recheck tomorrow to monitor for refeeding syndrome. Will plan for DC home tomorrow if tolerating goal on tube feeds and electrolytes normal. Discussed with case management - Archana Flood - all home supplies for tube feeds will be set up for discharge. All scripts for discharge done so that they can be prepared by the [facility].      Nano Schwab MD  08/12/22  10:52 CDT

## 2022-08-12 NOTE — CASE MANAGEMENT/SOCIAL WORK
Discharge Planning Assessment  [facility]     Patient Name: Enrique Coronado  MRN: [id number]  Today's Date: 8/12/2022    Admit Date: 8/11/2022     Discharge Needs Assessment     Row Name 08/12/22 1037       Living Environment    People in Home alone    Current Living Arrangements home    Primary Care Provided by self    Provides Primary Care For no one    Family Caregiver if Needed friend(s);child(rola), adult    Quality of Family Relationships helpful;involved;supportive    Able to Return to Prior Arrangements yes       Resource/Environmental Concerns    Resource/Environmental Concerns none    Transportation Concerns none       Transition Planning    Patient/Family Anticipates Transition to home;home with help/services    Transportation Anticipated family or friend will provide       Discharge Needs Assessment    Readmission Within the Last 30 Days no previous admission in last 30 days    Equipment Currently Used at Home cane, quad    Concerns to be Addressed care coordination/care conferences    Anticipated Changes Related to Illness none    Equipment Needed After Discharge nutrition supplies    Discharge Coordination/Progress Pt live at home alone but he said he will have someone helping him for a week. He does not want home health. He feels if he is taught, he will have no problem doing the tube feeding. Did find out that the TF is covered 100% by his insurance. Spoke with Dr. Schwab and plan is to d/c pt tomorrow. Informed Galina at [facility] [phone].               Discharge Plan    No documentation.               Continued Care and Services - Admitted Since 8/11/2022     Dialysis/Infusion     Service Provider Request Status Selected Services Address Phone Fax Patient Preferred    [facility]  Pending - Request Sent N/A [address] [place] [address] [phone] --                 Demographic Summary    No documentation.                Functional Status    No documentation.                 Psychosocial    No documentation.                Abuse/Neglect    No documentation.                Legal    No documentation.                Substance Abuse    No documentation.                Patient Forms    No documentation.                   IRINEO Manley

## 2022-08-12 NOTE — MBS/VFSS/FEES
Acute Care - Speech Language Pathology   Swallow VFSS in Radiology [facility]     Patient Name: Enrique Coronado  : 1950  MRN: [id number]  Today's Date: 2022               Admit Date: 2022       SPEECH-LANGUAGE PATHOLOGY EVALUTION - VFSS  Subjective: The patient was seen on this date for a VFSS(Videofluoroscopic Swallowing Study).  Patient was alert and cooperative.  reported that he had been consuming pureed diet consistency with regular/thin liquid consistency in addition to naturally thickened nutritional drinks 4-5x/day prior to acute admission, yet now s/p PEG placement secondary to significant weight loss. He reported that with pureed intake at home, he would often have to stop PO intake after a few bites, as food particles were able to be expelled from the nares. He reported ongoing oral pain, stated he has attempted the miracle mouthwash rinse as per instructed during txs, yet stated this has not helped, though he reported he has been able to seek at least some oral pain relief by utilizing chloraseptic spray prior to going to bed at night, as he reported oral pain often otherwise keeps him awake. He reported frequent L headache, currently rated at 6/10 on pain scale.  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  Objective: Risks/benefits were reviewed with the patient, and consent was obtained. The study was completed with SLP and Radiologist present. The patient was seen in lateral view(s). Textures given included nectar thick liquid and honey thick liquid.  Assessment: Oral mech exam revealed edentulous state, stated dentures are ill-fitting secondary to significant weight loss. 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 Pt was presented the following consistencies in the stated order: honey thick and nectar thick (each via spoon). Pt was noted to have rather  poor bolus formation, as well as difficulty with lingual anterior to posterior movement of the bolus evidenced by lingual rocking and lingual pumping. He exhibited reduced base of tongue strength which resulted in premature loss of the bolus into the laryngeal vestibule prior to the swallow with each consistencies, which resulted in lola aspiration down the anterior tracheal wall with each trial. He was noted to have observed coughing, which was also cued per SLP, yet he was unsuccessful in clearing the aspirated material at that time. It must be noted that weak anterior hyolaryngeal excursion was observed, no epiglottic inversion, poor pharyngeal contraction, difficulty of bolus passage into the esophagus with poor upper esophageal sphincter opening, appeared to have edematous aryteniods, also likely impacting ability to pass bolus material into the esophagus. Bulbus like protrusion was also observed in the supraglottic region on the anterior wall of the laryngeal vestibule. This concern was reported to Dr. Bird's APRN, who reported plan to discuss with Dr. Bird.  Esophageal screen was performed and demonstrated reduced upper esophageal sphincter opening, with what appeared to be narrow passage in the upper esophageal region..  SLP Findings: Patient presents with moderate oral and profound pharyngeal dysphagia.   Comments: The pt was educated on the observation of significant aspiration observed with each presented consistency, that he is at a high risk for aspiration with any PO, and should remain NPO except for the occasional ice chip (as in one every 15-30, following aspiration precautions, for pleasure/comfort purposes) without increased lung congestion. He was educated on SLP belief that he would benefit from NMES tx for dysphagia at this time, yet recent documentation appears to suggest possible CA recurrence as per Dr. Ayers's observations. If this is, in fact, true, NMES tx would be contraindicated  at this time.   RECOMMENDATIONS: NPO except for the occasional ice chip (as in one every 15-30, following aspiration precautions, for pleasure/comfort purposes) without increased lung congestion; Continue with nutrition via PEG; Aspiration precautions considering tube feeds and above mentioned ice chip allowance; RN to monitor for increased lung congestion; Oral care per nursing staff via oral care kits at least 4x/day and before pt initiates PO ice chip intake each day; D/C ice chip allowance if pt is unable to tolerate his secretions and/or if increased lung congestion arises; Outpatient PT services for lymphedema assessment and tx and outpatient SLP consult for dysphagia to be ordered per MD if not felt contraindicated given possible CA recurrence. ST to continue to follow and tx for dysphagia.   Other Recommended Evaluations: Referral to outpatient PT and SLP services for lymphedema and dysphagia management and tx.  NMES tx per SLP if not medically contraindicated.   Dysphagia therapy is recommended. Rationale: See above.  Juli Delarosa, CCC-SLP 8/12/2022 13:48 CDT    Visit Dx:     ICD-10-CM ICD-9-CM   1. Oropharyngeal dysphagia  R13.12 787.22   2. Severe protein-calorie malnutrition (HCC)  E43 262     Patient Active Problem List   Diagnosis   • Squamous cell cancer of epiglottis (HCC)   • Mass of left side of neck   • Former smoker   • History of radiation therapy   • Oropharyngeal dysphagia   • Severe protein-calorie malnutrition (HCC)     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   • Neoplasm of epiglottis    • Persistent headaches     d/t radiation   • SOB (shortness of breath) on exertion    • Tinnitus      Past Surgical History:   Procedure Laterality Date   • EYE  SURGERY Left     steel removed   • LARYNGOSCOPY Bilateral 12/27/2021    Procedure: MICRODIRECT LARYNGOSCOPY WITH/WITHOUT LASER;  Surgeon: Robert Ayers Jr., MD;  Location: [facility] OR;  Service: ENT;  Laterality: Bilateral;   • PANENDOSCOPY Bilateral 12/27/2021    Procedure: DIRECT LARYNGOSCOPY, ESOPHAGOSCOPY, BRONCHOSCOPY;  Surgeon: Robert Ayers Jr., MD;  Location: [facility] OR;  Service: ENT;  Laterality: Bilateral;   • PEG TUBE INSERTION N/A 8/11/2022    Procedure: ESOPHAGOGASTRODUODENOSCOPY WITH PERCUTANEOUS ENDOSCOPIC GASTROSTOMY TUBE INSERTION WITH ANESTHESIA;  Surgeon: Nano Schwab MD;  Location: [facility] OR;  Service: General;  Laterality: N/A;   • TEETH EXTRACTION     • TONSILLECTOMY         SLP Recommendation and Plan  SLP Swallowing Diagnosis: mild-moderate, oral dysphagia, profound, pharyngeal dysphagia, esophageal dysphagia (08/12/22 0840)  SLP Diet Recommendation: NPO, ice chips between meals after oral care, with supervision, long term alternate methods of nutrition/hydration, other (see comments) (Continue with nutrition via PEG.) (08/12/22 0840)  Recommended Precautions and Strategies: upright posture during/after eating, small bites of food and sips of liquid (08/12/22 0840)  SLP Rec. for Method of Medication Administration: meds via alternate route (08/12/22 0840)           Swallow Criteria for Skilled Therapeutic Interventions Met: demonstrates skilled criteria (08/12/22 0840)     Rehab Potential/Prognosis, Swallowing: re-evaluate goals as necessary (08/12/22 0840)  Therapy Frequency (Swallow): at least, 3 days per week (08/12/22 0840)  Predicted Duration Therapy Intervention (Days): until discharge (08/12/22 0840)                                  Plan of Care Reviewed With: patient  Progress:  (Eval)  Outcome Evaluation: VFSS completed in Radiology this AM. Pt was alert, cooperative, reported that he had been consuming pureed diet consistency with regular/thin liquid  consistency in addition to naturally thickened nutritional drinks 4-5x/day prior to acute admission, yet now s/p PEG placement secondary to significant weight loss. He reported that with pureed intake at home, he would often have to stop PO intake after a few bites, as food particles were able to be expelled from the nares. He reported ongoing oral pain, stated he has attempted the miracle mouthwash rinse as per instructed during txs, yet stated this has not helped, though he reported he has been able to seek at least some oral pain relief by utilizing chloraseptic spray prior to going to bed at night, as he reported oral pain often otherwise keeps him awake. He reported frequent L headache, currently rated at 6/10 on pain scale. Oral mech exam revealed edentulous state, stated dentures are ill-fitting secondary to significant weight loss. 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 Pt was presented the following consistencies in the stated order: honey thick and nectar thick (each via spoon). Pt was noted to have rather poor bolus formation, as well as difficulty with lingual anterior to posterior movement of the bolus evidenced by lingual rocking and lingual pumping. He exhibited reduced base of tongue strength which resulted in premature loss of the bolus into the laryngeal vestibule prior to the swallow with each consistencies, which resulted in lola aspiration down the anterior tracheal wall with each trial. He was noted to have observed coughing, which was also cued per SLP, yet he was unsuccessful in clearing the aspirated material at that time. It must be noted that weak anterior hyolaryngeal excursion was observed, no epiglottic inversion, poor pharyngeal contraction, difficulty of bolus passage into the esophagus with poor upper esophageal sphincter opening, appeared to have edematous aryteniods, also likely impacting ability to pass bolus material into the esophagus. Bulbus  like protrusion was also observed in the supraglottic region on the anterior wall of the laryngeal vestibule. This concern was reported to Dr. Bird's APRN, who reported plan to discuss with Dr. Bird. The pt was educated on the observation of significant aspiration observed with each presented consistency, that he is at a high risk for aspiration with any PO, and should remain NPO except for      SWALLOW EVALUATION (last 72 hours)     SLP Adult Swallow Evaluation     Row Name 08/12/22 0840 08/11/22 4169                Rehab Evaluation    Document Type evaluation  -TM evaluation  -CS       Subjective Information no complaints  -TM no complaints  -CS       Patient Observations alert;cooperative  -TM alert;cooperative;agree to therapy  -CS       Patient/Family/Caregiver Comments/Observations Pt attended the study independently.  -TM --       Patient Effort adequate  -TM good  -CS                General Information    Patient Profile Reviewed yes  -TM yes  -CS       Pertinent History Of Current Problem Hx of head and neck cancer, s/p radiation, PEG tube placed on 8/11.  -TM Hx of head and neck cancer, s/p radiation, PEG tube place on 8/11  -CS       Current Method of Nutrition NPO;gastrostomy feedings  -TM NPO;gastrostomy feedings  -CS       Precautions/Limitations, Vision WFL with corrective lenses  -TM WFL with corrective lenses  -CS       Precautions/Limitations, Hearing WFL  -TM WFL  -CS       Prior Level of Function-Communication WFL  -TM WFL  -CS       Prior Level of Function-Swallowing no diet consistency restrictions  -TM no diet consistency restrictions  -CS       Plans/Goals Discussed with patient  -TM patient  -CS       Barriers to Rehab medically complex;previous functional deficit  -TM medically complex  -CS       Patient's Goals for Discharge return to PO diet  -TM patient did not state  -CS                Pain    Additional Documentation Pain Scale: FACES Pre/Post-Treatment (Group)  - Pain Scale:  FACES Pre/Post-Treatment (Group)  -CS                Pain Scale: FACES Pre/Post-Treatment    Pain: FACES Scale, Pretreatment 0-->no hurt  -TM 0-->no hurt  -CS       Posttreatment Pain Rating 0-->no hurt  -TM 0-->no hurt  -CS                Oral Motor Structure and Function    Dentition Assessment edentulous  -TM edentulous  -CS       Secretion Management WNL/WFL  -TM WNL/WFL  -CS       Mucosal Quality moist, healthy  -TM moist, healthy  -CS       Volitional Swallow -- WFL  -CS       Volitional Cough -- WFL  -CS                Oral Musculature and Cranial Nerve Assessment    Oral Motor General Assessment generalized oral motor weakness  -TM generalized oral motor weakness  -CS                General Eating/Swallowing Observations    Respiratory Support Currently in Use room air  -TM --       Eating/Swallowing Skills fed by staff/caregiver  -TM --       Positioning During Eating upright in chair  -TM upright in bed  -CS       Utensils Used -- spoon;straw  -CS       Consistencies Trialed -- pureed;honey-thick liquids;thin liquids  -CS                Respiratory    Respiratory Status WFL  -TM --                Clinical Swallow Eval    Oral Prep Phase -- WFL  -CS       Oral Transit -- impaired  -CS       Oral Residue -- WFL  -CS       Pharyngeal Phase -- suspected pharyngeal impairment  -CS       Esophageal Phase -- unremarkable  -CS       Clinical Swallow Evaluation Summary -- See note  -CS                Oral Transit Concerns    Oral Transit Concerns -- increased oral transit time  -CS       Increased Oral Transit Time -- pudding  -CS                MBS/VFSS    Utensils Used spoon  -TM --                MBS/VFSS Interpretation    Oral Prep Phase impaired oral phase of swallowing  -TM --       Oral Transit Phase impaired  -TM --       Oral Residue WFL  -TM --       VFSS Summary See note.  -TM --                Oral Preparatory Phase    Oral Preparatory Phase inadequate manipulation  -TM --       Inadequate Manipulation  nectar-thick liquids;honey-thick liquids  -TM --                Oral Transit Phase    Impaired Oral Transit Phase tongue pumping;premature spillage of liquids into pharynx  -TM --       Tongue Pumping honey-thick liquids;nectar-thick liquids  -TM --       Premature Spillage of Liquids into Pharynx honey-thick liquids;nectar-thick liquids  -TM --                Initiation of Pharyngeal Swallow    Initiation of Pharyngeal Swallow bolus in valleculae;bolus in pyriform sinuses  -TM --       Pharyngeal Phase impaired pharyngeal phase of swallowing  -TM --       Penetration Before the Swallow nectar-thick liquids;honey-thick liquids  -TM --       Aspiration During the Swallow honey-thick liquids;nectar-thick liquids  -TM --       Aspiration After the Swallow honey-thick liquids;nectar-thick liquids  -TM --       Response to Penetration no response  -TM --       Response to Aspiration cough  -TM --       Attempted Compensatory Maneuvers multiple swallows;combination of maneuvers (see comments);other (see comments)  Cough and expectoration  -TM --                Esophageal Phase    Esophageal Phase other (see comments)  See note.  -TM --       Esophageal Phase, Comment See note  -TM --                SLP Evaluation Clinical Impression    SLP Swallowing Diagnosis mild-moderate;oral dysphagia;profound;pharyngeal dysphagia;esophageal dysphagia  -TM severe;oral dysphagia;suspected pharyngeal dysphagia;R/O pharyngeal dysphagia  -CS       Functional Impact risk of aspiration/pneumonia;risk of malnutrition;risk of dehydration  -TM risk of aspiration/pneumonia  -CS       Rehab Potential/Prognosis, Swallowing re-evaluate goals as necessary  -TM adequate, monitor progress closely  -CS       Swallow Criteria for Skilled Therapeutic Interventions Met demonstrates skilled criteria  -TM demonstrates skilled criteria  -CS                Recommendations    Therapy Frequency (Swallow) at least;3 days per week  -TM at least;2 days per week   -CS       Predicted Duration Therapy Intervention (Days) until discharge  -TM until discharge  -CS       SLP Diet Recommendation NPO;ice chips between meals after oral care, with supervision;long term alternate methods of nutrition/hydration;other (see comments)  Continue with nutrition via PEG.  -TM NPO  -CS       Recommended Diagnostics -- VFSS (MBS)  -CS       Recommended Precautions and Strategies upright posture during/after eating;small bites of food and sips of liquid  -TM upright posture during/after eating;small bites of food and sips of liquid  -CS       Oral Care Recommendations Oral Care BID/PRN  -TM Oral Care BID/PRN  -CS       SLP Rec. for Method of Medication Administration meds via alternate route  -TM meds via alternate route  -CS       Monitor for Signs of Aspiration -- yes;notify SLP if any concerns  -CS       Anticipated Discharge Disposition (SLP) -- unknown  -CS                Swallow Goals (SLP)    Swallow LTGs Patient will demonstrate progress toward functional swallow for  -TM Patient will demonstrate functional swallow for  -CS       Swallow STGs lingual strengthening goal selection (SLP);pharyngeal strengthening exercise goal selection (SLP)  -TM diet tolerance goal selection (SLP)  -CS       Diet Tolerance Goal Selection (SLP) -- Patient will tolerate trials of  -CS       Lingual Strengthening Goal Selection (SLP) lingual strengthening, SLP goal 1  -TM --       Pharyngeal Strengthening Exercise Goal Selection (SLP) pharyngeal strengthening exercise, SLP goal 1  - --                (LTG) Patient will demonstrate functional swallow for    Diet Texture (Demonstrate functional swallow) -- soft ground textures  -CS       Liquid viscosity (Demonstrate functional swallow) -- thin liquids  -CS       [place] (Demonstrate functional swallow) -- with minimal cues (75-90% accuracy)  -CS       Time Frame (Demonstrate functional swallow) -- by discharge  -CS       Barriers (Demonstrate  functional swallow) -- n/a  -CS       Progress/Outcomes (Demonstrate functional swallow) -- goal ongoing  -CS                (LTG) Patient will demonstrate progress toward functional swallow for    Diet Texture (Demonstrate progress toward functional swallow) pudding/puree snacks  -TM --       [place] (Demonstrate progress towards functional swallow) with 1:1 assist/ supervision  under SLP supervision only  -TM --       Time Frame (Demonstrate progress toward functional swallow) 1 week  -TM --       Barriers (Demonstrate progress toward functional swallow) Prior deficit  -TM --       Progress/Outcomes (Demonstrate progress toward functional swallow) new goal;goal ongoing  -TM --                (STG) Patient will tolerate trials of    Consistencies Trialed (Tolerate trials) pureed textures  -TM pureed/ mashed textures;honey/ moderately thick liquids;nectar/ mildly thick liquids;thin liquids;soft ground textures  -CS       Desired Outcome (Tolerate trials) without signs of distress;without signs/symptoms of aspiration  -TM without signs/symptoms of aspiration  -CS       [place] (Tolerate trials) with minimal cues (75-90% accuracy)  -TM with minimal cues (75-90% accuracy)  -CS       Time Frame (Tolerate trials) 1 week  -TM --       Progress/Outcomes (Tolerate trials) new goal;goal ongoing  -TM new goal  -CS                (STG) Lingual Strengthening Goal 1 (SLP)    Activity (Lingual Strengthening Goal 1, SLP) increase lingual tone/sensation/control/coordination/movement;increase tongue back strength  -TM --       Increase Lingual Tone/Sensation/Control/Coordination/Movement lingual movement exercises;lingual resistance exercises  -TM --       Increase Tongue Back Strength lingual movement exercises;lingual resistance exercises  -TM --       [place]/Accuracy (Lingual Strengthening Goal 1, SLP) with minimal cues (75-90% accuracy)  -TM --       Time Frame (Lingual Strengthening Goal 1, SLP) by discharge   -TM --       Barriers (Lingual Strengthening Goal 1, SLP) Prior fx deficit  -TM --       Progress/Outcomes (Lingual Strengthening Goal 1, SLP) new goal;goal ongoing  -TM --                (STG) Pharyngeal Strengthening Exercise Goal 1 (SLP)    Activity (Pharyngeal Strengthening Goal 1, SLP) increase timing;increase superior movement of the hyolaryngeal complex;increase anterior movement of the hyolaryngeal complex;increase squeeze/positive pressure generation;increase tongue base retraction  -TM --       Increase Timing gustatory stimulation (sour/cold)  -TM --       Increase Superior Movement of the Hyolaryngeal Complex Mendelsohn;falsetto  -TM --       Increase Anterior Movement of the Hyolaryngeal Complex shaker  -TM --       Increase Squeeze/Positive Pressure Generation super-supraglottic swallow  -TM --       Increase Tongue Base Retraction chaka  -TM --       [place]/Accuracy (Pharyngeal Strengthening Goal 1, SLP) with minimal cues (75-90% accuracy)  -TM --       Time Frame (Pharyngeal Strengthening Goal 1, SLP) by discharge  -TM --       Barriers (Pharyngeal Strengthening Goal 1, SLP) Prior fx deficit  -TM --       Progress/Outcomes (Pharyngeal Strengthening Goal 1, SLP) new goal;goal ongoing  -TM --             User Key  (r) = Recorded By, (t) = Taken By, (c) = Cosigned By    Initials Name Effective Dates    TM Juli Delarosa, CCC-SLP 06/16/21 -     CS Esteban Chapman MS CCC-SLP 06/16/21 -                 EDUCATION  The patient has been educated in the following areas:   Dysphagia (Swallowing Impairment).        SLP GOALS     Row Name 08/12/22 0840 08/11/22 1409          (LTG) Patient will demonstrate functional swallow for    Diet Texture (Demonstrate functional swallow) -- soft ground textures  -CS     Liquid viscosity (Demonstrate functional swallow) -- thin liquids  -CS     [place] (Demonstrate functional swallow) -- with minimal cues (75-90% accuracy)  -CS     Time Frame (Demonstrate  functional swallow) -- by discharge  -CS     Barriers (Demonstrate functional swallow) -- n/a  -CS     Progress/Outcomes (Demonstrate functional swallow) -- goal ongoing  -CS            (LTG) Patient will demonstrate progress toward functional swallow for    Diet Texture (Demonstrate progress toward functional swallow) pudding/puree snacks  -TM --     [place] (Demonstrate progress towards functional swallow) with 1:1 assist/ supervision  under SLP supervision only  -TM --     Time Frame (Demonstrate progress toward functional swallow) 1 week  -TM --     Barriers (Demonstrate progress toward functional swallow) Prior deficit  -TM --     Progress/Outcomes (Demonstrate progress toward functional swallow) new goal;goal ongoing  -TM --            (STG) Patient will tolerate trials of    Consistencies Trialed (Tolerate trials) pureed textures  -TM pureed/ mashed textures;honey/ moderately thick liquids;nectar/ mildly thick liquids;thin liquids;soft ground textures  -CS     Desired Outcome (Tolerate trials) without signs of distress;without signs/symptoms of aspiration  -TM without signs/symptoms of aspiration  -CS     [place] (Tolerate trials) with minimal cues (75-90% accuracy)  -TM with minimal cues (75-90% accuracy)  -CS     Time Frame (Tolerate trials) 1 week  -TM --     Progress/Outcomes (Tolerate trials) new goal;goal ongoing  -TM new goal  -CS            (STG) Lingual Strengthening Goal 1 (SLP)    Activity (Lingual Strengthening Goal 1, SLP) increase lingual tone/sensation/control/coordination/movement;increase tongue back strength  -TM --     Increase Lingual Tone/Sensation/Control/Coordination/Movement lingual movement exercises;lingual resistance exercises  -TM --     Increase Tongue Back Strength lingual movement exercises;lingual resistance exercises  -TM --     [place]/Accuracy (Lingual Strengthening Goal 1, SLP) with minimal cues (75-90% accuracy)  -TM --     Time Frame (Lingual Strengthening  Goal 1, SLP) by discharge  -TM --     Barriers (Lingual Strengthening Goal 1, SLP) Prior fx deficit  -TM --     Progress/Outcomes (Lingual Strengthening Goal 1, SLP) new goal;goal ongoing  -TM --            (STG) Pharyngeal Strengthening Exercise Goal 1 (SLP)    Activity (Pharyngeal Strengthening Goal 1, SLP) increase timing;increase superior movement of the hyolaryngeal complex;increase anterior movement of the hyolaryngeal complex;increase squeeze/positive pressure generation;increase tongue base retraction  -TM --     Increase Timing gustatory stimulation (sour/cold)  -TM --     Increase Superior Movement of the Hyolaryngeal Complex Mendelsohn;falsetto  -TM --     Increase Anterior Movement of the Hyolaryngeal Complex shaker  -TM --     Increase Squeeze/Positive Pressure Generation super-supraglottic swallow  -TM --     Increase Tongue Base Retraction chaka  -TM --     [place]/Accuracy (Pharyngeal Strengthening Goal 1, SLP) with minimal cues (75-90% accuracy)  -TM --     Time Frame (Pharyngeal Strengthening Goal 1, SLP) by discharge  -TM --     Barriers (Pharyngeal Strengthening Goal 1, SLP) Prior fx deficit  -TM --     Progress/Outcomes (Pharyngeal Strengthening Goal 1, SLP) new goal;goal ongoing  -TM --           User Key  (r) = Recorded By, (t) = Taken By, (c) = Cosigned By    Initials Name Provider Type    TM Juli Delarosa, CCC-SLP Speech and Language Pathologist    Esteban Oh, MS CCC-SLP Speech and Language Pathologist                   Time Calculation:    Time Calculation- SLP     Row Name 08/12/22 1347             Time Calculation- SLP    SLP Start Time 0840  -TM      SLP Stop Time 1040  -TM      SLP Time Calculation (min) 120 min  -TM      SLP Received On 08/12/22  -      SLP Goal Re-Cert Due Date 08/22/22  -TM              Untimed Charges    SLP Eval/Re-eval  ST Eval Oral Pharyng Swallow - 12790  -TM      49891-OI Motion Fluoro Eval Swallow Minutes 120  -TM              Total Minutes     Untimed Charges Total Minutes 120  -TM       Total Minutes 120  -TM            User Key  (r) = Recorded By, (t) = Taken By, (c) = Cosigned By    Initials Name Provider Type    TM Juli Delarosa CCC-SLP Speech and Language Pathologist                Therapy Charges for Today     Code Description Service Date Service Provider Modifiers Qty    [id number] HC ST MOTION FLUORO EVAL SWALLOW 8 8/12/2022 Juli Delarosa CCC-SLP GN 1               JAQUELIN Pavon  8/12/2022

## 2022-08-13 NOTE — DISCHARGE SUMMARY
Consults     No orders found from 7/13/2022 to 8/12/2022.       Nano Schwab MD - Discharge Summary    Date of Discharge:  8/13/2022    Discharge Diagnosis: Severe malnutrition, oropharyngeal dysphagia     Presenting Problem/History of Present Illness  Oropharyngeal dysphagia [R13.12]  Severe protein-calorie malnutrition (HCC) [E43]     Hospital Course  Patient is a 72 y.o. male presented for PEG placement on 8/11/22. Due to severe malnutrition, his tube feeds were slowly advanced and electrolytes were monitored due to risk of refeeding syndrome. He tolerated tube feeds well. Electrolytes were normal. Home tube feeding equipment was arranged. He was seen by speech and was recommended to stay NPO. He was discharged home in good condition on 8/13/22. Follow up with me PRN.       Procedures Performed  Procedure(s):  ESOPHAGOGASTRODUODENOSCOPY WITH PERCUTANEOUS ENDOSCOPIC GASTROSTOMY TUBE INSERTION WITH ANESTHESIA       Consults:   Consults     No orders found from 7/13/2022 to 8/12/2022.          Condition on Discharge:  improved    Vital Signs  Temp:  [97.2 °F (36.2 °C)-97.9 °F (36.6 °C)] 97.7 °F (36.5 °C)  Heart Rate:  [55-73] 55  Resp:  [16] 16  BP: ()/(50-58) 102/55    Physical Exam:   See History and Physical found in chart.    Discharge Disposition  Home or Self Care    Discharge Medications     Discharge Medications      New Medications      Instructions Start Date   acetaminophen 160 MG/5ML liquid  Commonly known as: TYLENOL   650 mg, Per G Tube, Every 8 Hours PRN      docusate 50 MG/5ML liquid  Commonly known as: COLACE   50 mg, Per G Tube, 2 Times Daily      multivitamin with iron-minerals (CENTRUM) liquid  Replaces: multivitamin with minerals tablet tablet   5 mL, Per G Tube, Daily      ondansetron ODT 4 MG disintegrating tablet  Commonly known as: Zofran ODT   4 mg, Translingual, Every 8 Hours PRN         Changes to Medications      Instructions Start Date   oxyCODONE 5 MG/5ML  solution  Commonly known as: ROXICODONE  What changed:   · how much to take  · how to take this  · when to take this   5 mg, Per G Tube, Every 6 Hours PRN         Continue These Medications      Instructions Start Date   fluticasone 50 MCG/ACT nasal spray  Commonly known as: FLONASE   2 sprays, Nasal, Daily PRN         Stop These Medications    calcium carbonate 500 MG chewable tablet  Commonly known as: TUMS     cetirizine 10 MG tablet  Commonly known as: zyrTEC     ENSURE NUTRITION SHAKE PO     multivitamin with minerals tablet tablet  Replaced by: multivitamin with iron-minerals (CENTRUM) liquid     polyethylene glycol 17 g packet  Commonly known as: MIRALAX            Discharge Diet: NPO with tube feeds     Activity at Discharge: as tolerated    Follow-up Appointments  Future Appointments   Date Time Provider Department Center   9/1/2022  3:00 PM Damian Bird III, MD [facility] PAD None   9/13/2022  1:45 PM Robert Ayers Jr., MD [facility] PAD PAD   9/27/2022  9:30 AM PAD CT 2  PAD CAT PAD   9/27/2022 11:00 AM  [facility]  PAD [facility] PAD   9/27/2022 11:30 AM Brad Rubin MD [facility] PAD PAD         Test Results Pending at Discharge       Nano Schwab MD  08/13/22  09:13 CDT

## 2022-08-13 NOTE — NURSING NOTE
Per pharmacy patient refused medications to bedside yesterday. Patient had medications that he wanted sent to his local pharmacy.

## 2022-08-13 NOTE — PLAN OF CARE
Goal Outcome Evaluation:  Plan of Care Reviewed With: patient        Progress: improving  Outcome Evaluation: IVF; Scheduled tylenol given per peg tube; Tube feeds will be at goal rate of 45ml's/hr as of 0600; up ad julienne; voiding per urinal; resting between care; safety maintained

## 2022-08-15 NOTE — THERAPY DISCHARGE NOTE
Acute Care - Speech Language Pathology Discharge Summary  [facility]       Patient Name: Enrique Coronado  : 1950  MRN: [id number]    Today's Date: 8/15/2022                   Admit Date: 2022      SLP Recommendation and Plan  NPO with PEG. Occasional ice chip for comfort.     Visit Dx:    ICD-10-CM ICD-9-CM   1. Oropharyngeal dysphagia  R13.12 787.22   2. Severe protein-calorie malnutrition (HCC)  E43 262                SLP GOALS     Row Name 08/15/22 1407             (LTG) Patient will demonstrate functional swallow for    Diet Texture (Demonstrate functional swallow) soft ground textures  -MG      Liquid viscosity (Demonstrate functional swallow) thin liquids  -MG      [place] (Demonstrate functional swallow) with minimal cues (75-90% accuracy)  -MG      Time Frame (Demonstrate functional swallow) by discharge  -MG      Barriers (Demonstrate functional swallow) n/a  -MG      Progress/Outcomes (Demonstrate functional swallow) discharged from facility;goal not met  -MG              (LTG) Patient will demonstrate progress toward functional swallow for    Diet Texture (Demonstrate progress toward functional swallow) pudding/puree snacks  -MG      [place] (Demonstrate progress towards functional swallow) with 1:1 assist/ supervision  -MG      Time Frame (Demonstrate progress toward functional swallow) 1 week  -MG      Barriers (Demonstrate progress toward functional swallow) Prior deficit  -MG      Progress/Outcomes (Demonstrate progress toward functional swallow) discharged from facility;goal not met  -MG              (STG) Patient will tolerate trials of    Consistencies Trialed (Tolerate trials) pureed textures  -MG      Desired Outcome (Tolerate trials) without signs of distress;without signs/symptoms of aspiration  -MG      [place] (Tolerate trials) with minimal cues (75-90% accuracy)  -MG      Time Frame (Tolerate trials) 1 week  -MG      Progress/Outcomes (Tolerate trials) discharged  from facility;goal not met  -MG              (STG) Lingual Strengthening Goal 1 (SLP)    Activity (Lingual Strengthening Goal 1, SLP) increase lingual tone/sensation/control/coordination/movement;increase tongue back strength  -MG      Increase Lingual Tone/Sensation/Control/Coordination/Movement lingual movement exercises;lingual resistance exercises  -MG      Increase Tongue Back Strength lingual movement exercises;lingual resistance exercises  -MG      [place]/Accuracy (Lingual Strengthening Goal 1, SLP) with minimal cues (75-90% accuracy)  -MG      Time Frame (Lingual Strengthening Goal 1, SLP) by discharge  -MG      Barriers (Lingual Strengthening Goal 1, SLP) Prior fx deficit  -MG      Progress/Outcomes (Lingual Strengthening Goal 1, SLP) discharged from facility;goal partially met  -MG              (STG) Pharyngeal Strengthening Exercise Goal 1 (SLP)    Activity (Pharyngeal Strengthening Goal 1, SLP) increase timing;increase superior movement of the hyolaryngeal complex;increase anterior movement of the hyolaryngeal complex;increase squeeze/positive pressure generation;increase tongue base retraction  -MG      Increase Timing gustatory stimulation (sour/cold)  -MG      Increase Superior Movement of the Hyolaryngeal Complex Mendelsohn;falsetto  -MG      Increase Anterior Movement of the Hyolaryngeal Complex shaker  -MG      Increase Squeeze/Positive Pressure Generation super-supraglottic swallow  -MG      Increase Tongue Base Retraction chaka  -MG      [place]/Accuracy (Pharyngeal Strengthening Goal 1, SLP) with minimal cues (75-90% accuracy)  -MG      Time Frame (Pharyngeal Strengthening Goal 1, SLP) by discharge  -MG      Barriers (Pharyngeal Strengthening Goal 1, SLP) Prior fx deficit  -MG      Progress/Outcomes (Pharyngeal Strengthening Goal 1, SLP) discharged from facility;goal partially met  -MG            User Key  (r) = Recorded By, (t) = Taken By, (c) = Cosigned By    Initials Name Provider  Type    MG Stephanie Colldao MS CCC-SLP Speech and Language Pathologist                        SLP Discharge Summary  Anticipated Discharge Disposition (SLP): unknown  Reason for Discharge: discharge from this facility  Progress Toward Achieving Short/long Term Goals: goals partially met within established timelines  Discharge Destination: home      Stephanie Collado MS CCC-SLP  8/15/2022

## 2022-09-13 NOTE — PROGRESS NOTES
Robert Ayers Jr, MD  [facility]AR [facility]SE [facility] THRO[facility]  [address], [address]  [place] [address]  Fax [phone]  Phone [phone]      Visit Type: FOLLOW UP   Chief Complaint   Patient presents with   • Follow-up     Recheck neck and BOT        HPI   Accompanied by: No one  He presents for a follow up evaluation. He has been better since PEG. He is still unable to swallow. He is drinking very little. Notes NP regurgitation.      Cancer Surveillance:  Cancer site: Epiglottis/base of tongue  Initial staging: T3 N2 M0  Re-staging: None  Therapy: Radiation, chemotherapy  Completion therapy: 3/2022    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   • Neoplasm of epiglottis    • Persistent headaches     d/t radiation   • SOB (shortness of breath) on exertion    • Tinnitus        Past Surgical History:   Procedure Laterality Date   • EYE SURGERY Left     steel removed   • LARYNGOSCOPY Bilateral 12/27/2021    Procedure: MICRODIRECT LARYNGOSCOPY WITH/WITHOUT LASER;  Surgeon: Robert Ayers Jr., MD;  Location: [facility] OR;  Service: ENT;  Laterality: Bilateral;   • PANENDOSCOPY Bilateral 12/27/2021    Procedure: DIRECT LARYNGOSCOPY, ESOPHAGOSCOPY, BRONCHOSCOPY;  Surgeon: Robert Ayers Jr., MD;  Location: [facility] OR;  Service: ENT;  Laterality: Bilateral;   • PEG TUBE INSERTION N/A 8/11/2022    Procedure: ESOPHAGOGASTRODUODENOSCOPY WITH PERCUTANEOUS ENDOSCOPIC GASTROSTOMY TUBE INSERTION WITH ANESTHESIA;  Surgeon: Nano Schwab MD;  Location: [facility] OR;  Service: General;  Laterality: N/A;   • TEETH EXTRACTION     • TONSILLECTOMY         Family History: His family history includes Cancer in his mother and  paternal grandmother.     Social History: He  reports that he quit smoking about 18 months ago. His smoking use included cigars. He has never used smokeless tobacco. He reports current alcohol use. He reports that he does not use drugs.    Home Medications:  acetaminophen, fluticasone, multivitamin with minerals, ondansetron ODT, and oxyCODONE    Allergies:  He has No Known Allergies.       Vital Signs:   Temp:  [98.4 °F (36.9 °C)] 98.4 °F (36.9 °C)  Heart Rate:  [87] 87  BP: (106)/(62) 106/62  ENT Physical Exam  Constitutional  Appearance: patient appears well-developed, well-nourished and well-groomed,  Communication/Voice: communication appropriate for developmental age; vocal quality normal;  Constitutional comments: Cachectic but increased weight  Head and Face  Appearance: face appears normal and face appears atraumatic;  Palpation: facial palpation normal;  Salivary: glands normal;  Head and Face comments: Temporal wasting  Ear  Hearing: impaired to conversational voice;  Auricles: bilateral auricles normal;  External Mastoids: bilateral external mastoids normal;  Ear Canals: bilateral ear canals normal;  Tympanic Membranes: bilateral tympanic membranes normal;  Nose  External Nose: nares patent bilaterally; external nose normal;  Oral Cavity/Oropharynx  Lips: normal;  Teeth: missing teeth (Edentulous upper and lower) and dentures (upper) noted;  Gums: gingiva normal;  Tongue: normal;  Oral mucosa: normal;  Hard palate: normal;  Soft palate: normal;  Tonsils: right tonsil fossa well-healed; bilateral tonsils absent,  Base of Tongue: normal; with no lesion present; Base of Tongue comments: palpation  Posterior pharyngeal wall: no lesion or mass noted;  Neck  Neck: neck normal; neck palpation normal;  Thyroid: thyroid normal;  Neck comments: Very thin with atrophy muscles.  Respiratory  Inspection: breathing unlabored; normal breathing rate;  Cardiovascular  Inspection: extremities are warm and well perfused;  no peripheral edema present;  Lymphatic  Palpation: no cervical adenopathy noted;  Lymphatic comments: Left Level 3 3 cm firm, immobile mass  Neurovestibular  Mental Status: alert and oriented;  Psychiatric: mood normal; affect is appropriate;  Cranial Nerves: cranial nerves intact;  Drawings       Laryngoscopy    Date/Time: 9/13/2022 2:09 PM  Performed by: Robert Ayers Jr., MD  Authorized by: Robert Ayers Jr., MD     Consent:     Consent obtained:  Verbal    Consent given by:  Patient    Alternatives discussed:  No treatment  Anesthesia (see MAR for exact dosages):     Anesthesia method:  Topical application    Topical anesthetic:  Tetracaine  Procedure details:     Indications: oncologic surveillance      Medication:  Afrin  Nasal cavity:     Nasal vestibule: left nasal cavity occluded    Sinus/ Nasopharynx:     Right nasopharynx: patent and inflammation      Left nasopharynx: patent and inflammation      Right eustachian tube: patent      Left eustachian tube: inflammation, patent and inflammation    Oropharynx/ Supraglottis:     Posterior pharyngeal wall: inflamed      Oropharynx: inflammation      Vallecula: inflammation      Vallecula: no lesion      Base of tongue: lingual tonsillar hypertrophy and inflammation      Epiglottis: inflammation       comment:  Entire right side of epiglottis and most of left side of epiglottis has essentially dissolved after radiation therapy  Larynx/ Hypopharynx:     Arytenoids: inflammation      Hypopharynx: inflammation      False vocal cords: inflammation      True vocal cords: inflammation and Danna's edema    Post-procedure details:     Patient tolerance of procedure:  Tolerated well  Comments:      Area of epiglottis and supraglottis that was previously infiltrated with carcinoma has completely resorbed with almost no epiglottis left covering the supraglottic aperture.  There is significant edema of the surrounding tissues.  There is no evidence of  recurrence on examination.  There is significant pooling of the secretions       Result Review    RESULTS REVIEW    I have reviewed the patients old records in the chart.   I have reviewed the patients old records in the chart.     Assessment & Plan    Diagnoses and all orders for this visit:    1. 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 Abnormal weight loss    4. 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 Moderate protein-calorie malnutrition (HCC)    6. Oropharyngeal dysphagia  Overview:  Added automatically from request for surgery [id number]    Orders:  -     Ambulatory Referral to Speech Therapy     Continue current management plan.     Patient is better overall.  He is tolerating tube feedings well.  He continues to be essentially n.p.o. because of his dysphagia.  He has no epiglottis after treatment because of the area of cancer.  He has no airway protection.  I will continue the patient n.p.o. and continue tube feeding at this point in time.  As the patient improves and his nutrition also improves, I will see if he is a candidate for further swallowing therapy.  I am concerned because of the resorption of the area of tumor.  I will follow this closely.  I will plan probable rebiopsy in the next 2 to 3 months to see if radiation therapy has resolved the cancer.  He appears to have radiation edema and radiation changes.  I am concerned that his prognosis for swallowing is poor  Swallowing therapy  PEG feedings      Return in about 6 weeks (around 10/25/2022) for Recheck supraglottis, Flex scope.      Robert Ayers Jr, MD  09/13/22  14:12 CDT

## 2022-09-13 NOTE — PATIENT INSTRUCTIONS
Swallowing therapy    NASAL SALINE:  Use 2 puffs each nostril 4-6 times daily and more frequently if possible.  You can buy saline spray or you can make your own and use an old spray bottle to administer  Use a humidifier at bedside  Recipe for saline:  Water                                 1 quart  Salt (table)                        1 tablespoon  Gylcerin (or Mercy Syrup)    1 teaspoon  Sodium bicarbonate           1 teaspoon  Sprays or Stanton pots are recommended    Do not allow to stand for more than 24 hrs. Make new solution. There is no preservative in this solution.     Continue tube feeding    Neck stretching        CONTACT INFORMATION:  The main office phone number is [phone]. For emergencies after hours and on weekends, this number will convert over to our answering service and the on call provider will answer. Please try to keep non emergent phone calls/ questions to office hours 9am-5pm Monday through Friday.     HitFix  As an alternative, you can sign up and use the Epic MyChart system for more direct and quicker access for non emergent questions/ problems.  [email or web address] allows you to send messages to your doctor, view your test results, renew your prescriptions, schedule appointments, and more. To sign up, go to My[facility] and click on the Sign Up Now link in the New User? box. Enter your HitFix Activation Code exactly as it appears below along with the last four digits of your Social Security Number and your Date of Birth () to complete the sign-up process. If you do not sign up before the expiration date, you must request a new code.    HitFix Activation Code: Activation code not generated  Current HitFix Status: Active    If you have questions, you can email [email or web address] or call [phone] to talk to our [employer] staff. Remember, [employer]Fix is NOT to be used for urgent needs. For medical emergencies, dial 911.

## 2022-09-22 NOTE — PROGRESS NOTES
Speech/Language Pathology Initial Evaluation and Plan of Care    Patient: Enrique Coronado               : 1950  Visit Date: 2022  Referring practitioner: Robert Cuevas  Date of Initial Visit: 2022  Patient seen for 1 sessions    Visit Diagnoses:    ICD-10-CM ICD-9-CM   1. 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   • Neoplasm of epiglottis    • Persistent headaches     d/t radiation   • SOB (shortness of breath) on exertion    • Tinnitus      Past Surgical History:   Procedure Laterality Date   • EYE SURGERY Left     steel removed   • LARYNGOSCOPY Bilateral 2021    Procedure: MICRODIRECT LARYNGOSCOPY WITH/WITHOUT LASER;  Surgeon: Robert Ayers Jr., MD;  Location: [facility] OR;  Service: ENT;  Laterality: Bilateral;   • PANENDOSCOPY Bilateral 2021    Procedure: DIRECT LARYNGOSCOPY, ESOPHAGOSCOPY, BRONCHOSCOPY;  Surgeon: Robert Ayers Jr., MD;  Location: [facility] OR;  Service: ENT;  Laterality: Bilateral;   • PEG TUBE INSERTION N/A 2022    Procedure: ESOPHAGOGASTRODUODENOSCOPY WITH PERCUTANEOUS ENDOSCOPIC GASTROSTOMY TUBE INSERTION WITH ANESTHESIA;  Surgeon: Nano Schwab MD;  Location: [facility];  Service: General;  Laterality: N/A;   • TEETH EXTRACTION     • TONSILLECTOMY         SUBJECTIVE     Subjective   Patient presents with the following symptoms: history of aspiration and unable to swallow due to radiation changes   Date of Onset: 2022  History of present problems: 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 PEG tube placement.  Former smoker.   Current Diet Level:   Water/ice chips only  Non-oral Feeding: Gastronomy tube    OBJECTIVE     ORAL MECH  Respiratory/Swallow Coordination: Severely impaired  Position During Evaluation: Upright  Anatomy/Physiology: Patient presents with radiation changes  Dentition: Patient is endentulous  Oral Health: Dry oral mucosa and sticky secretions  Awareness/Control of Secretions: Patient wipes mouth and There is anterior loss of secretions    Method of Food Administration: Cup , thin water    CLINICAL OBSERVATIONS  Oral Phase: reduced tongue anterior/posterior movement, decreased oral sensation (bilateral) and reduced tone in tongue (bilateral)  Pharyngeal Symptoms: multiple swallows with consistencies of thin water  Summary of Clinical Exam: Patient presents with severe oropharyngeal dysphagia due to radiation treatment to squamous cell carcinoma of the epiglottis.      INSTRUMENTAL ASSESSMENT  Instrumental Exam Completed?: Yes :  Date: 8/12/2022  Hospital/Medical Center: [facility]  Results: Nickolas aspiration of honey and nectar thick liquids given via spoon due to deficit in oral phase, base of tongue strength, hyolaryngeal elevation, and UES opening.   Recommendations: NPO, remain with Gtube feedings, water/ice chips with strict oral care, lymphedema assessment.   Please see SLP report of VFSS  Severity Level of Dysphagia: severe dysphagia  Consistencies Aspirated/Penetrated: Aspirated: nectar and honey, only consistencies given.   Summary Statement: The pt was educated on the observation of significant aspiration observed with each presented consistency, that he is at a high risk for aspiration with any PO, and should remain NPO    IMPRESSION/RECOMMENDATIONS  Factors Affecting Performance: no difficulty participating in  study  Learning Motivation: strong  Education/Learning Comments: Patient demonstrated understanding of evaluation results and plan of care.     PATIENT SELF ASSESSMENT    EAT 10  0= No problem 4= Severe problem  1. My swallowing has caused me to lose weight    2. My Swallowing problem interferes with my ability to go out for meals     3. Swallowing liquids takes extra effort    4. Swallowing solids takes extra effort    5. Swallowing pills takes extra effort    6. Swallowing is painful    7. The pleasure of eating is affected by my swallowing    8. When I swallow, food sticks in my throat    9. I cough when I eat    10. Swallowing is stressful                                                                                                   Total Score      0-9 Minimal  10-19 Mild  20-29 Moderate 30-40 Severe      Clinical Impression (Swallowing):   Severe: oropharyngeal phase dysphagia  Impact on Function: risk for inadequate nutrition, inadequate hydration, aspiration, pneumonia and social aspects of eating  Prognosis Comment: Prognosis is guarded; however patient displays high motivation for improvement and agrees to follow all recommendations and participate in daily exercises to improve swallowing.       Therapy Education/Self Care    Details: Evaluation results and POC   Given Home Exercise Program   Initial exercise: Effortful swallow   Progress: New   Education provided to:  Patient   Level of understanding Verbalized           Total Time of Visit:            60   mins    ASSESSMENT/PLAN     Goals                                            STG  Comments Status   Patient will improve oral skills to enhance safety and increase eating efficiency and bolus control as measured by improved bolus formation and improved posterior propulsion of the bolus.     Patient will improve hyolaryngeal elevation, improve laryngeal closure, improve UES opening, increase base of the tongue strength and posterior pharyngeal wall  excursion and increase efficiency of cough to clear residue from the airway as measured by decreased overt signs and symptoms of aspiration and decreased penetration and aspiration on repeat instrumental swallow study, if applicable.      Patient will report decreased difficulty with swallowing and improved EAT-10 score.          LTG      Patient will safely consume some PO diet without aspiration or pneumonia while maintaining nutrition/hydration via alternate means.       ASSESSMENT:   Patient is indicated for skilled care by a Speech/Language Pathologist.     Summary of Assessment: Severe oraopharyngeal dysphagia post radiation treatment to the epiglottis.     Recommendations for Diet/Nutrition: NPO, water or ice chips between meals and aggressive oral care  Swallowing Precautions: small sips, clear airway  Therapy Recommendations: dysphagia therapy to address swallowing deficits and repeat instrumental before advancing diet  Screening indicates the further need for Physical Therapy for lymphedema    PLAN:  Details of Plan of Care: Initiate therapy. Will request PT/Lymphedema referral. Strict oral care. Home exercises daily.     Frequency: 1 time per week  Duration: 12 visits  Estimated Length of Session: 45 minutes    SPEECH/LANGUAGE PATHOLOGIST SIGNATURE: Ana Luisa Plata, CCC-SLP, [place] License #: [id number]  Electronically Signed on 9/22/2022        Initial Certification  Certification Period: 9/22/2022 through 12/20/2022  I certify that the therapy services are furnished while this patient is under my care.  The services outlined above are required by this patient, and will be reviewed every 90 days.     PHYSICIAN: Robert Ayers Jr., MD (NPI: [id number])    Signature:____________________________________________DATE: _________     Please sign and return via fax to [phone].   Thank you so much for letting us work with Enrique. I appreciate your letting us work with your patients. If you have any questions  or concerns, please don't hesitate to contact me.          [address]  [place] [address]  [phone]

## 2022-09-22 NOTE — PROGRESS NOTES
I have reviewed the notes, assessments, and/or procedures performed. I concur with her/his documentation of Enrique Coronado.    Robert Ayers Jr, MD  9/22/2022  15:34 CDT

## 2022-09-26 NOTE — TELEPHONE ENCOUNTER
Caller: Enrique Coronado    Relationship to patient: Self    Best call back number  [phone]    Chief complaint: PT HAD CT (NEED TO CANC) DONE LAST MONTH    Type of visit: LAB & F/U    Requested date: CALL TO [facility]    If rescheduling, when is the original appointment:9/27

## 2022-09-28 NOTE — TELEPHONE ENCOUNTER
[facility] received a fax that requires your attention. The document has been indexed to the patient’s chart for your review.      Reason for sending: PRESCRIPTION FORM FOR AN EMST-75    Documents Description: EXT MED RECS_DR. FRIAS_9/27/22    Name of Sender: NOHEMY MCKENNA    Date Indexed: 09/27/22    Notes (if needed):

## 2022-10-11 NOTE — TELEPHONE ENCOUNTER
----- Message from Brad Rubin MD sent at 10/11/2022 12:01 PM CDT -----  Injectafer 750 mg one dose.  Premed Tylenol 500 mg p.o. and Benadryl 25 mg IV.  No oral iron due to dysphagia.

## 2022-10-11 NOTE — TELEPHONE ENCOUNTER
I called to inform patient about their low iron and that Dr. Rubin is requesting one dose of Injectafer 750mg. They was coming into town the next day 10/12/2022 for another doctor appointment. and wanted to get it while they was in town, I called and got that arranged but the infusion center was super busy and if he couldn't make it by his appointment time he would have to reschedule. Patient verbalized understanding.

## 2022-10-11 NOTE — TELEPHONE ENCOUNTER
----- Message from Brad Rubin MD sent at 10/11/2022 11:26 AM CDT -----  Fax CMP to PCP.  Elevated transaminase.

## 2022-10-12 PROBLEM — D50.9 IRON DEFICIENCY ANEMIA: Status: ACTIVE | Noted: 2022-01-01

## 2022-10-12 NOTE — PROGRESS NOTES
Speech Language Pathology Treatment Note  [address] [place] [address]    Patient: Enrique Coronado                                                                                     Visit Date: 10/12/2022  :     1950    Referring practitioner:    Robert Ayers Jr*  Date of Initial Visit:          Type: THERAPY  Noted: 2022    Patient seen for 2 sessions    Visit Diagnoses:    ICD-10-CM ICD-9-CM   1. Oropharyngeal dysphagia  R13.12 787.22     SUBJECTIVE     Patient was alert and ready for therapy. He stated that he is feeling better.     OBJECTIVE   GOALS  Goals                                            STG  Comments Status   Patient will improve oral skills to enhance safety and increase eating efficiency and bolus control as measured by improved bolus formation and improved posterior propulsion of the bolus.  Tongue press up and out modeled and able to demonstrate. Able to demonstrate effortful swallow New   Patient will improve hyolaryngeal elevation, improve laryngeal closure, improve UES opening, increase base of the tongue strength and posterior pharyngeal wall excursion and increase efficiency of cough to clear residue from the airway as measured by decreased overt signs and symptoms of aspiration and decreased penetration and aspiration on repeat instrumental swallow study, if applicable.   Effortful swallow, CTAR, super-supraglottic swallow, and EMST exercises introduced, patient able to demonstrate New   Patient will report decreased difficulty with swallowing and improved EAT-10 score.  Ongoing New           LTG        Patient will safely consume some PO diet without aspiration or pneumonia while maintaining nutrition/hydration via alternate means.  Patient is highly motivated to improve. Exercises introduced.  New         Therapy Education/Self Care    Details: POC   Given [employer] HEP (access code Access Code:  [email or web address])   Progress: New   Education provided to:  Patient   Level of understanding Verbalized             Total Time of Visit:             45   mins         ASSESSMENT/PLAN     ASSESSMENT: Patient able to demonstrate use of exercises as instructed with model and cuing. Demonstrated understanding of oral care.     PLAN: Continue therapy and home exercises.     Progress Note Due Date: 10/20/22  Recertification Due Date: 12/20/22    SIGNATURE: Ana Luisa Plata CCC-SLP, [place] License #: [id number]  Electronically Signed on 10/12/2022          [address] [place] [address], [place] [address]  [phone]

## 2022-10-20 NOTE — PROGRESS NOTES
Speech Language Pathology 30 Day Progress Note  [address] [place] [address]    Patient: Enrique Coronado                                                                                     Visit Date: 10/20/2022  :     1950    Referring practitioner:    Robert Ayers Jr*  Date of Initial Visit:          Type: THERAPY  Noted: 2022    Patient seen for 3 sessions    Visit Diagnoses:    ICD-10-CM ICD-9-CM   1. Oropharyngeal dysphagia  R13.12 787.22     SUBJECTIVE     Patient was alert and ready for therapy. He continues to c/o pain in his neck and sinuses.     OBJECTIVE   GOALS  Goals                                            STG  Comments Status   Patient will improve oral skills to enhance safety and increase eating efficiency and bolus control as measured by improved bolus formation and improved posterior propulsion of the bolus.  Patient able to demonstrate effortful swallow and tongue press exercises. No problems reported with tongue press. Needs breaks during effortful swallow to catch his breath.  Ongoing   Patient will improve hyolaryngeal elevation, improve laryngeal closure, improve UES opening, increase base of the tongue strength and posterior pharyngeal wall excursion and increase efficiency of cough to clear residue from the airway as measured by decreased overt signs and symptoms of aspiration and decreased penetration and aspiration on repeat instrumental swallow study, if applicable.  Patient having difficulty with super-supraglottic swallow due to sob. Modified to give pt more time to breathe in between. Also instructed him on working up to full reps with CTAR. Able to demonstrate. Waiting on EMST to arrive, forms and order have been submitted.  Ongoing   Patient will report decreased difficulty with swallowing and improved EAT-10 score.  Ongoing, patient continues to have significant difficulty.             LTG         Patient will safely consume some PO diet without aspiration or pneumonia while maintaining nutrition/hydration via alternate means.  Patient is highly motivated to improve. Exercises introduced.  Ongoing         Therapy Education/Self Care    Details: POC   Given [employer] HEP (access code Access Code: [email or web address])   Progress: Reinforced   Education provided to:  Patient   Level of understanding Verbalized             Total Time of Visit:             45   mins         ASSESSMENT/PLAN     ASSESSMENT: Patient able to demonstrate use of exercises as instructed with model and cuing. Demonstrated understanding of oral care.     PLAN: Continue therapy and home exercises. MD to place order for Lymphedema evaluation, eval is scheduled for 10/27/22.     Progress Note Due Date: 11/20/22  Recertification Due Date: 12/20/22    SIGNATURE: Ana Luisa Plata CCC-SLP, [place] License #: [id number]  Electronically Signed on 10/20/2022          [place] [id number]  [phone]

## 2022-10-27 NOTE — PROGRESS NOTES
Physical Therapy Initial Evaluation and Plan of Care  [address] [place] [address]    Patient: Enrique Coronado             : 1950  Today's Date: 10/27/2022  Referring practitioner: Robert Ayers Jr*  Date of Initial Visit: 10/27/2022  Patient seen for 1 sessions    Visit Diagnoses:    ICD-10-CM ICD-9-CM   1. 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   • Neoplasm of epiglottis    • Persistent headaches     d/t radiation   • SOB (shortness of breath) on exertion    • Tinnitus      Past Surgical History:   Procedure Laterality Date   • EYE SURGERY Left     steel removed   • LARYNGOSCOPY Bilateral 2021    Procedure: MICRODIRECT LARYNGOSCOPY WITH/WITHOUT LASER;  Surgeon: Robert Ayers Jr., MD;  Location: [facility] OR;  Service: ENT;  Laterality: Bilateral;   • PANENDOSCOPY Bilateral 2021    Procedure: DIRECT LARYNGOSCOPY, ESOPHAGOSCOPY, BRONCHOSCOPY;  Surgeon: Robert Ayers Jr., MD;  Location: [facility] OR;  Service: ENT;  Laterality: Bilateral;   • PEG TUBE INSERTION N/A 2022    Procedure: ESOPHAGOGASTRODUODENOSCOPY WITH PERCUTANEOUS ENDOSCOPIC GASTROSTOMY TUBE INSERTION WITH ANESTHESIA;  Surgeon: Nano Schwab MD;  Location: [facility] OR;  Service: General;  Laterality: N/A;   • TEETH EXTRACTION     • TONSILLECTOMY         SUBJECTIVE     Subjective Evaluation    History of Present Illness  Mechanism of injury: He can't turn his head to the L far at all. He reports daily headaches from the L base of his skull where it is pulling. They can be severe. He can't swallow because it all comes up and out his nose.     Subjective  comment: His headaches and swallow are his complaints. Pain  Current pain ratin  At worst pain rating: 10  Location: up the back of his head- starting on L         The patient living arrangement includes home independently and home with family member.     OBJECTIVE     Objective    Lymphedema     Row Name 10/27/22 1200             Subjective Pain    Able to rate subjective pain? yes  -HR      Pre-Treatment Pain Level 4  -HR         Lymphedema Assessment    Lymphedema Classification Neck:;Face:;secondary  -HR      Stage of Cancer Stage IV  -HR      Chemo Received yes  -HR      Radiation Therapy Received yes  -HR      Radiation Treatments #/Timeframe 35  -HR      Adverse Radiation Reactions/Complication destroyed epiglottis, fibrotic tissue changes  -HR      Infections or Cellulitis? yes  -HR      Infection/Cellulitis Treatment He had COVID and flu this year.  -HR         Posture/Observations    Posture/Observations Comments Normal sitting posture, ear is 3 1/2 inches forward from shoulder  -HR         General ROM    Head/Neck/Trunk Neck Extension;Neck Flexion;Neck Lt Lateral Flexion;Neck Rt Lateral Flexion;Neck Lt Rotation;Neck Rt Rotation  -HR         Head/Neck/Trunk    Neck Extension AROM 25  -HR      Neck Flexion AROM 25  before tightness in the front of the neck  -HR      Neck Lt Lateral Flexion AROM 15  -HR      Neck Rt Lateral Flexion AROM 8  -HR      Neck Lt Rotation AROM 42  -HR      Neck Rt Rotation AROM 47  -HR      Head/Neck/Trunk Comments main tightness in L SCM  -HR         MMT (Manual Muscle Testing)    General MMT Comments deconditioned, but no true strength deficits in UEs  -HR         Lymphedema Edema Assessment    Edema Assessment Comment internal edema- causes choking. Also has large amount of fluid drain down to throat when he rests his head back in reclined position  -HR         Lymphedema Measurements    Measurement Type(s) Circumferential  -HR      Circumferential Areas Neck  -HR          Neck Circumferential (cm)    Measurement Location 1 6 cm from base of chin  -HR      Neck 1 38.5 cm  -HR      Neck Circumferential Total 38.5 cm  -HR            User Key  (r) = Recorded By, (t) = Taken By, (c) = Cosigned By    Initials Name Provider Type    HR Taty Norman, PT, DPT, CLT-[place] Physical Therapist              Therapeutic Exercises    [id number] Comments   Went through HEP and performed Cues for proper technique                   Timed Minutes 10       Therapy Education/Self Care [id number]   Education offered today HEP   [employer] Code HEP for head and neck lymphedema   Ongoing HEP   Discussed importance of posture and trying to keep head back over shoulders.   Timed Minutes        Total Timed Treatment:     10   mins  Total Time of Visit:            50   mins    ASSESSMENT/PLAN     Goals                                          Progress Note due by 11/26/22                                                      Recert due by 1/24/23   STG by: 6 weeks Comments Date Status   Patient will have a good basic understanding of lymphedema and its suggested risk reduction practices      Patient will be independent with remedial HEP for lymphedema      Patient will be independent with self MLD for lymphedema            LTG by: 12 weeks      Patient will have appropriate compression garments for lymphedema maintenance      Patient will have no sign or symptoms of infection      Patient will be independent with comprehensive maintenance program for lymphedema                          Assessment & Plan     Assessment  Impairments: abnormal or restricted ROM and lacks appropriate home exercise program  Functional Limitations: uncomfortable because of pain  Assessment details: Mr. Coronado would benefit from lymphedema treatment to address the excess fluid in his head and neck due to radiation damage. He has more tightness and pain in the SCM on the L and gets severe headaches that originate from that tightness. He feels  choked with chin tucks as well as extension of his neck. I explained this could be due to inflammation and edema internally that compress the trachea when the neck is flexed or extended. He will start with the HEP I gave him today and we will use MLD and compression as well as indicated to help him. Thank you for the referral!      Barriers to therapy: Stage IV cancer, radiation damage  Prognosis: good    Plan  Therapy options: will be seen for skilled therapy services  Planned therapy interventions: manual therapy, soft tissue mobilization, stretching, therapeutic activities, functional ROM exercises, home exercise program and compression  Frequency: 1x week  Duration in weeks: 12  Treatment plan discussed with: patient  Plan details: PT to see for neck lymphedema as well as radiation induced tissue tightness with pain and loss of ROM.          SIGNATURE: Taty Norman, PT, DPT, [facility] License #: [id number]  Electronically Signed on 10/27/2022    Initial Certification  Certification Period: 10/27/2022 through 1/24/2023  I certify that the therapy services are furnished while this patient is under my care.  The services outlined above are required by this patient, and will be reviewed every 90 days.     PHYSICIAN: Robert Ayers Jr., MD (NPI: [id number])    Signature:_________________________________________DATE: ________      Please sign and return via fax to [phone].   Thank you so much for letting us work with Enrique. I appreciate your letting us work with your patients. If you have any questions or concerns, please don't hesitate to contact me.          [address] [address]  [phone]

## 2022-10-27 NOTE — PROGRESS NOTES
Speech Language Pathology Treatment Note  [address] [place] [address]    Patient: Enrique Coronado                                                                                     Visit Date: 10/27/2022  :     1950    Referring practitioner:    Robert Ayers Jr*  Date of Initial Visit:          Type: THERAPY  Noted: 2022    Patient seen for 4 sessions    Visit Diagnoses:    ICD-10-CM ICD-9-CM   1. Oropharyngeal dysphagia  R13.12 787.22     SUBJECTIVE     Patient was alert and ready for therapy. He continues to c/o pain in his neck and copious drainage from his sinuses. He saw PT for lymphedema today.     OBJECTIVE   GOALS  Goals                                            STG  Comments Status   Patient will improve oral skills to enhance safety and increase eating efficiency and bolus control as measured by improved bolus formation and improved posterior propulsion of the bolus.  Patient able to demonstrate effortful swallow and tongue press exercises. No problems reported with tongue press. Cued to breathe between swallows during effortful swallow exercise.  Ongoing   Patient will improve hyolaryngeal elevation, improve laryngeal closure, improve UES opening, increase base of the tongue strength and posterior pharyngeal wall excursion and increase efficiency of cough to clear residue from the airway as measured by decreased overt signs and symptoms of aspiration and decreased penetration and aspiration on repeat instrumental swallow study, if applicable.  Patient completing exercises with modifications due to shortness of breath and drainage/phlegm. Modified to give pt more time to breathe in between. Using EMST at home, did not bring today.  Ongoing   Patient will report decreased difficulty with swallowing and improved EAT-10 score.  Ongoing, patient continues to have significant difficulty.  Ongoing           LTG         Patient will safely consume some PO diet without aspiration or pneumonia while maintaining nutrition/hydration via alternate means.  Patient is highly motivated to improve. Exercises introduced.  Ongoing         Therapy Education/Self Care    Details: POC   Given Medbridge HEP (access code Access Code: [email or web address])   Progress: Reinforced   Education provided to:  Patient   Level of understanding Verbalized             Total Time of Visit:             45   mins         ASSESSMENT/PLAN     ASSESSMENT: Patient able to demonstrate use of exercises as instructed with model and cuing. Demonstrated understanding of oral care. He continues to c/o copious sinus drainage and phlegm. Tongue continues to be coated despite report of strict oral care. Advised to speak to MD regarding oral solution (swish and spit) for thrush if needed, he stated that he took a pill for it that gave him bad diarrhea.  He continues to c/o things coming out his nose if he tries to drink (water). VFSS reviewed and palatal sufficiency appears intact. Nasal reflux may be due to pharyngeal reflux after the swallow. Will add palatal exercises next visit.     PLAN: Continue therapy and home exercises. PT eval for lymphedema completed and schedule pending. Go to every other week for speech as patient is completing exercises at home as able.     Progress Note Due Date: 11/20/22  Recertification Due Date: 12/20/22    SIGNATURE: Ana Luisa Plata, CCC-SLP, [place] License #: [id number]  Electronically Signed on 10/27/2022          Jonathan Bee. [id number]  [phone]

## 2022-11-07 NOTE — PROGRESS NOTES
Robert Ayers Jr, MD  [facility]AR [facility]SE & TH[facility]AT  [address], [address]  [facility] [address]  Fax [phone]  Phone [phone]      Visit Type: FOLLOW UP   Chief Complaint   Patient presents with   • Cancer Surveillance   • Follow-up     Still can not take anything by mouth   • Dizziness   • Difficulty Swallowing        HPI   Accompanied by: No one  He presents for a follow up evaluation. He is still not swallowing.  He wheezes in the middle of noght.  He does not feel right.  He has headaches, deandre after SPEECH LANGUAGE PATHOLOGY exercises.  Muscles in neck are tighter. He has more tension.  Weight is stable at 128 lbs.   He is SOB now.    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   • Neoplasm of epiglottis    • Persistent headaches     d/t radiation   • SOB (shortness of breath) on exertion    • Tinnitus        Past Surgical History:   Procedure Laterality Date   • EYE SURGERY Left     steel removed   • LARYNGOSCOPY Bilateral 12/27/2021    Procedure: MICRODIRECT LARYNGOSCOPY WITH/WITHOUT LASER;  Surgeon: Robert Ayers Jr., MD;  Location: [facility] OR;  Service: ENT;  Laterality: Bilateral;   • PANENDOSCOPY Bilateral 12/27/2021    Procedure: DIRECT LARYNGOSCOPY, ESOPHAGOSCOPY, BRONCHOSCOPY;  Surgeon: Robert Ayers Jr., MD;  Location: [facility] OR;  Service: ENT;  Laterality: Bilateral;   • PEG TUBE INSERTION N/A 8/11/2022    Procedure: ESOPHAGOGASTRODUODENOSCOPY WITH PERCUTANEOUS ENDOSCOPIC GASTROSTOMY TUBE INSERTION WITH ANESTHESIA;  Surgeon: Nano Schwab MD;  Location: [facility] OR;  Service: General;  Laterality: N/A;   • TEETH EXTRACTION     • TONSILLECTOMY         Family History: His family  history includes Cancer in his mother and paternal grandmother.     Social History: He  reports that he quit smoking about 20 months ago. His smoking use included cigars. He has never used smokeless tobacco. He reports current alcohol use. He reports that he does not use drugs.    Home Medications:  acetaminophen, fluticasone, multivitamin with minerals, ondansetron ODT, and oxyCODONE    Allergies:  He has No Known Allergies.       Vital Signs:   Temp:  [100.4 °F (38 °C)] 100.4 °F (38 °C)  Heart Rate:  [80] 80  BP: (112)/(67) 112/67  ENT Physical Exam  Constitutional  Appearance: patient appears well-developed, well-nourished and well-groomed,  Communication/Voice: communication appropriate for developmental age; vocal quality normal;  Constitutional comments: Cachectic but increased weight  Using cane  Head and Face  Appearance: face appears normal and face appears atraumatic;  Palpation: facial palpation normal;  Salivary: glands normal;  Head and Face comments: Temporal wasting  Ear  Hearing: impaired to conversational voice;  Auricles: bilateral auricles normal;  External Mastoids: bilateral external mastoids normal;  Ear Canals: bilateral ear canals normal;  Tympanic Membranes: bilateral tympanic membranes normal;  Nose  External Nose: nares patent bilaterally; external nose normal;  Internal Nose: bilateral intranasal mucosa edematous and erythematous; nasal septal deviation present; deviation is to the right, bilateral inferior turbinates edematous and erythematous;  Oral Cavity/Oropharynx  Lips: normal;  Teeth: missing teeth (Edentulous upper and lower) noted; no dentures noted; no dentures present;  Gums: gingiva normal;  Tongue: normal;  Oral mucosa: normal;  Hard palate: normal;  Soft palate: normal;  Tonsils: right tonsil fossa well-healed; bilateral tonsils absent,  Base of Tongue: normal; with no lesion present; Base of Tongue comments: palpation  Posterior pharyngeal wall: no lesion or mass  noted;  OC/OP comments: Very dry  Neck  Neck: neck normal; neck palpation normal;  Thyroid: thyroid normal;  Neck comments: Very thin with atrophy muscles.  Respiratory  Inspection: breathing unlabored; normal breathing rate;  Cardiovascular  Inspection: extremities are warm and well perfused; no peripheral edema present;  Lymphatic  Palpation: no cervical adenopathy noted;  Lymphatic comments: Left Level 3 3 cm firm, immobile mass  Neurovestibular  Mental Status: alert and oriented;  Psychiatric: mood normal; affect is appropriate;  Cranial Nerves: cranial nerves intact;  Drawings         Flexible laryngoscopy    Date/Time: 11/7/2022 3:07 PM  Performed by: Robert Ayers Jr., MD  Authorized by: Robert Ayers Jr., MD     Consent:     Consent obtained:  Verbal    Alternatives discussed:  No treatment  Anesthesia (see MAR for exact dosages):     Anesthesia method:  Topical application    Topical anesthetic:  Tetracaine  Procedure details:     Indications: oncologic surveillance      Medication:  Afrin    Instrument: flexible fiberoptic laryngoscope    Sinus/ Nasopharynx:     Right nasopharynx: patent and inflammation      Left nasopharynx: patent and inflammation      Right eustachian tube: patent      Left eustachian tube: inflammation, patent and inflammation    Oropharynx/ Supraglottis:     Posterior pharyngeal wall: inflamed      Oropharynx: inflammation      Vallecula: inflammation      Base of tongue: lingual tonsillar hypertrophy and inflammation       comment:  Epiglottis is essentially absent  Larynx/ Hypopharynx:     Arytenoids: inflammation      Hypopharynx: inflammation      Pyriform sinus: inflammation      False vocal cords: lesion (Right side 3 mm lesion on top of the false cord) and inflammation      True vocal cords: inflammation    Post-procedure details:     Patient tolerance of procedure:  Tolerated well  Comments:      Patient has generalized inflammation of the oral and  hypopharynx.  He has no epiglottis  He has granulation along the edge where the epiglottis used to be with a mass over the right false cord approximately 3 mm  There is mucus on top of the true vocal folds partially obstructing the glottis  Vocal cord mobility is decreased but the glottis is not compromised.       Result Review    RESULTS REVIEW    I have reviewed the patients old records in the chart.   I have reviewed the patients old records in the chart.  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 Abnormal weight loss    3. 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 Moderate protein-calorie malnutrition (HCC)    5. Oropharyngeal dysphagia  Overview:  Added automatically from request for surgery [id number]      6. Aspiration into airway, subsequent encounter    7. Velopharyngeal insufficiency (VPI), congenital    Other orders  -     $ Laryngoscopy     Continue current management plan.  Patient appears to have granulation in the supraglottic area.  I am concerned about recurrence of the tumor.  I will get a CT scan to evaluate his airway and larynx.  I will plan direct laryngoscopy with biopsy after this to evaluate possible recurrence.  If he does have recurrence, he may require referral to tertiary center for laryngectomy with reconstruction.  This may help with his swallowing as well.  His entire epiglottis is  completely absent at this point.  He definitely has aspiration on examination.  CT scan the larynx  Call for problems  Continue PEG tube feedings    My Chart:  Patient is using My Chart    Patient understand(s) and agree(s) with the treatment plan as described.  Return in about 6 weeks (around 12/19/2022) for Recheck Throat, Flex scope.      Robert Ayers Jr, MD  11/07/22  15:08 CST

## 2022-11-07 NOTE — PATIENT INSTRUCTIONS
See Dr Graham for thorough check up    ORAL COMPLICATIONS OF CANCER TREATMENT    Cancer treatments can have a toxic effect on the mucous membranes of the mouth and throat tissues. Almost all patients who receive radiation therapy for head and neck malignancies will develop problems with their gums, mouth, tongue, or teeth. Chemotherapy and bone marrow transplants can also affect these areas.    Many of the problems can be prevented or minimized by careful and diligent attention. Patients should seek dental care prior to beginning the cancer treatments. Taking care of existing problems before therapy can prevent major problems later, including tooth and bone infection, tooth and bone loss, and pain.    Dr. Ayers works closely with the medical and radiation oncologists, as well as the dentist and oral care providers to ensure the optimal heal of the head and neck tissues during cancer treatments. However, the patient needs to maintain a routine to care for the teeth and mucus membranes before and especially during and after treatment.    General Oral Complications of Cancer Treatment  Inflammation and ulceration of the mucous membranes, called stomatitis or mucositis  Infection  Salivary gland dysfunction, called xerostomia  Tooth decay and demineralization  Impaired function; difficulty eating, swallowing, speaking  Chronic throat pain/ scratchiness  Altered taste perception  Poor nutrition    Additional complications of Chemotherapy  Nerve pain  Bleeding  Skin damage  Prolonged/delayed healing    Additional complications of Radiation therapy  Rampant dental decay  Skin damage  Jaw or neck stiffness  Increased susceptibility to injury and infection  Prolonged/delayed healing    HOW TO CARE FOR YOUR ORAL HEALTH DURING CANCER THERAPY  See a dentist prior to beginning radiation therapy  Brush your teeth and tongue gently with an extra soft, even bristled toothbrush and a bland toothpaste after every meal and at  bedtime  Floss teeth once a day but avoid areas that are bleeding or sore  Don’t use mouthwash that contains alcohol  Rinse mouth with baking soda/salt combination or an antiplaque solution at room temperature several times daily. You may also use plain water.  Use a waterpik aimed at the teeth, not the gums with a warm baking soda and salt mixture.  If you wear dentures, wear them only when necessary and never at night.  Increase water intake and use oral moisturizing gels.  Use fluoride if recommended by the dentist.  Exercise jaw muscles 3 times daily and more. Do 20 repetitions of opening and closing the mouth, gently stretching and holding open. If the jaw muscles are particularly stiff, you may insert your fingers and gently pull the jaws apart.  Avoid rough-textured or irritating foods. Drink sips of liquids with each bite to allow the food to soften or thin the food.  Use Chapstick, Blistex, or other soothing lip balms, including Vaseline or Polysporin to the lips.  If the lips begin to crust, do not pull this off is there is resistance. Instead, soak the lips with a warm wash cloth to gently loosen these crusts.  Talk with Dr. Ayers about pain or numbing medications, both topical and systemic.  Quit smoking, chewing, snuff. All of these tobacco products accelerate the decay process and increase cancer risks.    For a dry mouth:  Sip water frequently  Use a humidifier  Suck ice chips or sugar free candy  Chew sugar free gum  If desired, use saliva substitute or gel, or prescription saliva stimulant  Avoid lemon glycerin swabs and sugar candies and lozenges    Recipe for oral rinse:  Salt  1 tablespoon  Baking Soda 1 teaspoon  Water  1 quart  Mercy syrup 1 tablespoon    Optional:  Small amount of mouthwash for flavoring  If you have scabs or extremely thick mucous, you may mix the above 50:50 with hydrogen peroxide to help loosen this thick layer.    Dietary Instructions:  Choose soft, moist foods  Moisten  foods with gravy, broth or butter  Increase fluids with meals  Keep food bite-sized  Blenderize foods or use baby foods  Avoid:  Very hot or very cold beverages  Scratchy or rough foods like pretzels, chips, crackers or nuts  Spicy or salty foods like canned soups. vinegar, ketchup, salsa  Alcohol, beer, wine, whiskey, etc.  Strongly minted candies and toothpaste  Fumes like ammonia, household , paints, gasoline, solvents    Skin care with Cancer treatment:  Moisturize the area of treatment at least 4 times daily and more often  Use hypoallergenic moisturizers  Ask Dr. Ayers about certain healing creams and lotions  Avoid strong soaps and excessive makeup  Avoid the sun  If sun exposure is anticipated, use high SPF sunblocks and Zinc Oxide  Do not use razor blades  Avoid any trauma to the area, including squeezing pimples and popping blisters  Report any sores or skin breakdown to Dr. Ayers    Medications:  Continue all your daily medications  If you are experiencing side effects, report these to Dr. Ayers  Continue your multivitamins  Certain medications may interfere with radiation. Please check with Dr. Ayers  Certain natural and homeopathic products can interfere and lessen the efficacy of radiation, especially anti-oxidants. Please ask Dr. Ayers about these products.    Please do not hesitate to call the office for questions or problems.     NASAL SALINE:  Use 2 puffs each nostril 4-6 times daily and more frequently if possible.  You can buy saline spray or you can make your own and use an old spray bottle to administer  Use a humidifier at bedside  Recipe for saline:  Water                                 1 quart  Salt (table)                        1 tablespoon  Gylcerin (or Mercy Syrup)    1 teaspoon  Sodium bicarbonate           1 teaspoon  Sprays or Debbie pots are recommended    Do not allow to stand for more than 24 hrs. Make new solution. There is no preservative in this  solution.      CT neck ordered    CONTACT INFORMATION:  The main office phone number is [phone]. For emergencies after hours and on weekends, this number will convert over to our answering service and the on call provider will answer. Please try to keep non emergent phone calls/ questions to office hours 9am-5pm Monday through Friday.     Snipshot  As an alternative, you can sign up and use the Epic MyChart system for more direct and quicker access for non emergent questions/ problems.  [facility] Snipshot allows you to send messages to your doctor, view your test results, renew your prescriptions, schedule appointments, and more. To sign up, go to [employer] and click on the Sign Up Now link in the New User? box. Enter your Snipshot Activation Code exactly as it appears below along with the last four digits of your Social Security Number and your Date of Birth () to complete the sign-up process. If you do not sign up before the expiration date, you must request a new code.    Snipshot Activation Code: Activation code not generated  Current Snipshot Status: Active    If you have questions, you can email [email or web address] or call [phone] to talk to our Snipshot staff. Remember, Snipshot is NOT to be used for urgent needs. For medical emergencies, dial 911.

## 2022-11-09 NOTE — PROGRESS NOTES
Physical Therapy Treatment Note  [address] [place] [address]    Patient: Enrique Coronado                                                 Visit Date: 2022  :     1950    Referring practitioner:    Robert Ayers Jr*  Date of Initial Visit:          Type: THERAPY  Noted: 10/27/2022    Patient seen for 2 sessions    Visit Diagnoses:    ICD-10-CM ICD-9-CM   1. 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 yes  -HR      Pre-Treatment Pain Level 9  -HR         Subjective Comments    Subjective Comments He has a terrible pain all up the L side of his neck and head.  -HR         Manual Lymphatic Drainage    Manual Lymphatic Drainage opened regional lymph nodes;head/neck treatment  -HR      Opened Regional Lymph Nodes axillary  -HR      Axillary right;left  -HR      Head/Neck Treatment pre-auricular nodes;post-auricular nodes;occipital nodes;full/complex MLD;other head/neck treatment  -HR      Pre-Auricular Nodes careful with pressure on L  -HR      Post-Auricular Nodes careful with pressure on L  -HR      Occipital Nodes tolerated better  -HR      Full/Complex MLD worked on gentle skin stretch and MLD along the entire L side of the head and neck as well as the submandibular region, the chest and SCF.  -HR      Other Head/Neck Treatment very gentle stretch towards chin tuck with some suboccipital distraction.  -HR      Manual Therapy 60  -HR            User Key  (r) = Recorded By, (t) = Taken By, (c) = Cosigned By    Initials Name Provider Type    HR Taty Norman, PT, DPT, CLT-[facility]APINCITO Physical Therapist                Therapy Education/Self Care [id number]   Education offered today Watch posture   Medbride Code    Ongoing HEP   He has  remedial HEP that he is working on   Timed Minutes        Total Timed Treatment:     60   mins  Total Time of Visit:             60   mins         ASSESSMENT/PLAN     GOALS  Goals                                          Progress Note due by 11/26/22                                                      Recert due by 1/24/23   STG by: 6 weeks Comments Date Status   Patient will have a good basic understanding of lymphedema and its suggested risk reduction practices         Patient will be independent with remedial HEP for lymphedema  working on this 11/9 ongoing   Patient will be independent with self MLD for lymphedema                   LTG by: 12 weeks         Patient will have appropriate compression garments for lymphedema maintenance         Patient will have no sign or symptoms of infection         Patient will be independent with comprehensive maintenance program for lymphedema             Assessment/Plan     ASSESSMENT: He has a lot of tenderness along the L side of the head and neck. Per note from radiation, he has a lesion suspected of cancer again. He did not say that. He got a little bit of relief with increased ability to turn his head to the left.     PLAN: Cont with MLD for pain relief as able. May try gentle compression with some coban next time and see if it helps or hurts.    SIGNATURE: Taty Norman, PT, DPT, CLZAN-[facility] [place] License #: [id number]  Electronically Signed on 11/9/2022        [address] [place] [address]  [phone]

## 2022-11-18 NOTE — PROGRESS NOTES
Speech Language Pathology 30 Day Progress Note  [address] [place] [address]    Patient: Enrique Coronado                                                                                     Visit Date: 2022  :     1950    Referring practitioner:    Robert Ayers Jr*  Date of Initial Visit:          Type: THERAPY  Noted: 2022    Patient seen for 5 sessions    Visit Diagnoses:    ICD-10-CM ICD-9-CM   1. Oropharyngeal dysphagia  R13.12 787.22     SUBJECTIVE     Patient was alert and ready for therapy. He continues to c/o pain in his neck and copious drainage from his sinuses. He saw PT for lymphedema today.     OBJECTIVE   GOALS  Goals                                            STG  Comments Status   Patient will improve oral skills to enhance safety and increase eating efficiency and bolus control as measured by improved bolus formation and improved posterior propulsion of the bolus.  Patient able to demonstrate 30 effortful swallows with breaks due to shortness of breath and sinus drainage. Doing tongue press exercises at home. No problems reported with tongue press. Cued to rest and take breaths as needed.  Ongoing   Patient will improve hyolaryngeal elevation, improve laryngeal closure, improve UES opening, increase base of the tongue strength and posterior pharyngeal wall excursion and increase efficiency of cough to clear residue from the airway as measured by decreased overt signs and symptoms of aspiration and decreased penetration and aspiration on repeat instrumental swallow study, if applicable.  Patient completing exercises with modifications due to shortness of breath and drainage/phlegm. Using EMST at home, did not bring today. He is not able to complete full exercise protocol due to sob, he is using it as much as he can several times per day rather than trying to do it all at once.  Ongoing   Patient will  report decreased difficulty with swallowing and improved EAT-10 score.  Ongoing, patient continues to have significant difficulty. Not able to swallow anything other than a few small sips per day.  Ongoing           LTG        Patient will safely consume some PO diet without aspiration or pneumonia while maintaining nutrition/hydration via alternate means.  Patient is highly motivated to improve. Exercises ongoing.  Ongoing         Therapy Education/Self Care    Details: POC   Given Medbridge HEP (access code Access Code: [email or web address])   Progress: Reinforced   Education provided to:  Patient   Level of understanding Verbalized             Total Time of Visit:             30   mins         ASSESSMENT/PLAN     ASSESSMENT: Patient able to demonstrate use of exercises as instructed with modifications. Demonstrated understanding of oral care. He continues to c/o copious sinus drainage and phlegm.     PLAN: Continue therapy every other week and continue home exercises.     Progress Note Due Date: 12/20/22  Recertification Due Date: 12/20/22    SIGNATURE: Ana Luisa Plata CCC-SLP, [place] License #: [id number]  Electronically Signed on 11/18/2022          [facility] [place] [address]  [phone]

## 2022-11-18 NOTE — PROGRESS NOTES
Physical Therapy Treatment Note  [address] [place] [address]    Patient: Enrique Coronado                                                 Visit Date: 2022  :     1950    Referring practitioner:    Robert Ayers Jr*  Date of Initial Visit:          Type: THERAPY  Noted: 10/27/2022    Patient seen for 3 sessions    Visit Diagnoses:    ICD-10-CM ICD-9-CM   1. 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      PAIN: 6/10  Pain after treatment 3-4/10         OBJECTIVE     Objective    Lymphedema     Row Name 22 0945             Subjective Pain    Able to rate subjective pain? yes  -AL      Pre-Treatment Pain Level 6  -AL      Subjective Pain Comment Headache  -AL         Manual Lymphatic Drainage    Manual Lymphatic Drainage opened regional lymph nodes;head/neck treatment  -AL      Opened Regional Lymph Nodes axillary  Sternal node  -AL      Axillary right;left  -AL      Head/Neck Treatment pre-auricular nodes;post-auricular nodes;occipital nodes;full/complex MLD;other head/neck treatment  -AL      Post-Auricular Nodes careful with pressure on L  -AL      Full/Complex MLD worked on gentle skin stretch and MLD along the entire L side of the head and neck as well as the submandibular region, the chest and SCF. Also MLD to the face.  -AL      Other Head/Neck Treatment In sitting STM to the L SCM and L upper trap  -AL      Manual Therapy 45  -AL            User Key  (r) = Recorded By, (t) = Taken By, (c) = Cosigned By    Initials Name Provider Type    AL Kavya Dumont, PTA, [facility] Physical Therapist Assistant                Therapy Education/Self Care 70822   Education offered today Watch posture   Medbridge Code    Ongoing HEP   He has remedial HEP that he is working on   Timed Minutes        Total Timed Treatment:     45   mins  Total Time of  Visit:             45   mins         ASSESSMENT/PLAN     GOALS        Goals                                          Progress Note due by 11/26/22                                                      Recert due by 1/24/23   STG by: 6 weeks Comments Date Status   Patient will have a good basic understanding of lymphedema and its suggested risk reduction practices         Patient will be independent with remedial HEP for lymphedema  working on this 11/9 ongoing   Patient will be independent with self MLD for lymphedema  Will check into a Flexitouch pump. 11/18               LTG by: 12 weeks         Patient will have appropriate compression garments for lymphedema maintenance         Patient will have no sign or symptoms of infection         Patient will be independent with comprehensive maintenance program for lymphedema             Assessment/Plan     ASSESSMENT:  Patient's pain decreased after today's treatment and he had more ROM turning his head to the R.  He also felt like his sinuses were better. I did not wrap patient today as he had speech therapy right after his treatment with me.  We did discuss the Flexitouch pump and may pursue this.    PLAN: Cont with MLD for pain relief as able. May try gentle compression with some coban next time and see if it helps or hurts.  Have patient sign the consent for the Flexitouch pump.     SIGNATURE: Kavya Dumont PTA, [place], [place] License #: [id number]  Electronically Signed on 11/18/2022        [facility]  [place] [address]  [phone]

## 2022-12-02 NOTE — PROGRESS NOTES
Speech Language Pathology Treatment Note  [address] [place] [address]    Patient: Enrique Coronado                                                                                     Visit Date: 2022  :     1950    Referring practitioner:    Robert Ayers Jr*  Date of Initial Visit:          Type: THERAPY  Noted: 2022    Patient seen for 6 sessions    Visit Diagnoses:    ICD-10-CM ICD-9-CM   1. Oropharyngeal dysphagia  R13.12 787.22   2. 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 He saw PT for lymphedema today. He reports that he continues to loose weight, He has lost 10 lbs and is down to about 118 lbs per patient report. He attributes this to severe diarrhea. He has severe headaches.  He also reports worsening difficulty with breathing and shortness of breath. He says he cannot do anything without feeling like he can't breathe. He was encouraged to speak to his PCP regarding this.     OBJECTIVE   GOALS  Goals                                            STG  Comments Status   Patient will improve oral skills to enhance safety and increase eating efficiency and bolus control as measured by improved bolus formation and improved posterior propulsion of the bolus.  Patient able to demonstrate 10 effortful swallows with shortness of breath and sinus drainage. Doing tongue press exercises at home. No problems reported with tongue press. Cued to rest and take breaths as needed.  Ongoing   Patient will improve hyolaryngeal elevation, improve laryngeal closure, improve UES opening, increase base of the tongue strength and posterior pharyngeal wall excursion and increase efficiency of cough to clear residue from the airway as measured by decreased overt signs and symptoms of aspiration and decreased penetration and aspiration on repeat instrumental swallow study,  if applicable.  Patient completing exercises with modifications due to shortness of breath and drainage/phlegm. Using EMST at home, he said he can do about 15 reps before becoming too short of breath. He did not bring today. He is using it as much as he can several times per day rather than trying to do it all at once.  Ongoing   Patient will report decreased difficulty with swallowing and improved EAT-10 score.  Ongoing, patient continues to have significant difficulty. Not able to swallow anything other than a few small sips per day. He is loosing weight from diarrhea.  Ongoing           LTG        Patient will safely consume some PO diet without aspiration or pneumonia while maintaining nutrition/hydration via alternate means.  Patient is highly motivated to improve. Exercises ongoing. He is having difficulty with exercises due to SOB and phlegm. He is loosing weight despite PEG due to diarrhea.  Ongoing         Therapy Education/Self Care    Details: POC   Given [employer] HEP (access code Access Code: [email or web address])   Progress: Reinforced   Education provided to:  Patient   Level of understanding Verbalized             Total Time of Visit:             25   mins         ASSESSMENT/PLAN     ASSESSMENT: Patient able to demonstrate use of exercises as instructed with modifications. Demonstrated understanding of oral care. He continues to c/o copious sinus drainage and phlegm as well as weight loss and increasing SOB.     PLAN: Continue therapy every other week and continue home exercises. Patient may benefit from referral to GI as well as Pulmonology. He was encouraged to call his PCP regarding this. I also suggested he try running a cool mist humidifier in his bedroom at night to help with sinus drainage.     Progress Note Due Date: 12/20/22  Recertification Due Date: 12/20/22    SIGNATURE: Ana Luisa Plata CCC-SLP, [place] License #: [id number]  Electronically Signed on 12/2/2022          [address] [facility] [place]  [address]  [phone]

## 2022-12-02 NOTE — PROGRESS NOTES
Physical Therapy Treatment Note and 30 Day Progress Note  [address] [place] [address]    Patient: Enrique Coronado                                                 Visit Date: 2022  :     1950    Referring practitioner:    Robert Ayers Jr*  Date of Initial Visit:          Type: THERAPY  Noted: 10/27/2022    Patient seen for 4 sessions    Visit Diagnoses:    ICD-10-CM ICD-9-CM   1. Oropharyngeal dysphagia  R13.12 787.22   2. 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 He states he becomes short of breath easily.     PAIN: 6/10 with Tylenol  Pain after treatment 3/10         OBJECTIVE     Objective    Lymphedema     Row Name 22 0900             Subjective Pain    Able to rate subjective pain? yes  -AL      Pre-Treatment Pain Level 6  -AL      Post-Treatment Pain Level 3  -AL      Subjective Pain Comment Headache  -AL         Lymphedema Measurements    Measurement Type(s) Circumferential  -AL      Circumferential Areas Neck  -AL         Neck Circumferential (cm)    Measurement Location 1 6 cm from base of chin  -AL      Neck 1 34 cm  -AL      Neck Circumferential Total 34 cm  -AL         Manual Lymphatic Drainage    Manual Lymphatic Drainage opened regional lymph nodes;head/neck treatment;initial sequence  -AL      Initial Sequence diaphragmatic breathing  -AL      Diaphragmatic Breathing x 10  Made headache worse  -AL      Opened Regional Lymph Nodes axillary  Sternal node  -AL      Axillary right;left  -AL      Head/Neck Treatment pre-auricular nodes;post-auricular nodes;occipital nodes;full/complex MLD;other head/neck treatment  -AL      Full/Complex MLD worked on gentle skin stretch and MLD along the entire L side of the head and neck as well as the submandibular region, the chest and SCF. Also  MLD to the face.  -AL      Other Head/Neck Treatment In sitting STM to the L SCM and L upper trap  -AL      Manual Therapy 45  -AL         Compression/Skin Care    Compression/Skin Care compression garment  -AL      Compression/Skin Care Comments Use shelf strap for compression to the neck.  -AL            User Key  (r) = Recorded By, (t) = Taken By, (c) = Cosigned By    Initials Name Provider Type    AL Kavya Dumont, PTA, [facility] Physical Therapist Assistant                 Therapeutic Exercises    25520 Units Comments   Gentle stretching for neck into sidebending     Cervical rotation stretch     SCM stretch               Timed Minutes 10        Therapy Education/Self Care 13983   Education offered today Watch posture wear compression.    Medbridge Code    Ongoing HEP   He has remedial HEP that he is working on   Timed Minutes        Total Timed Treatment:     55   mins  Total Time of Visit:             55   mins         ASSESSMENT/PLAN     GOALS        Goals                                          Progress Note due by 1/1/23                                                      Recert due by 1/24/23   STG by: 6 weeks Comments Date Status   Patient will have a good basic understanding of lymphedema and its suggested risk reduction practices  Educate each treatment 12/2 Ongoing   Patient will be independent with remedial HEP for lymphedema He is working on his HEP 12/2 Ongoing   Patient will be independent with self MLD for lymphedema He has been working on the MLD. 12/2 Ongoing             LTG by: 12 weeks         Patient will have appropriate compression garments for lymphedema maintenance He will use a shelf strap for compression. 12/2 Ongoing   Patient will have no sign or symptoms of infection No sing/symptoms of infection 12/2 Ongoing   Patient will be independent with comprehensive maintenance program for lymphedema  He is interested in the Flexitouch pump 12/2 Ongoing         Assessment/Plan      ASSESSMENT:  Patient's felt much better after treatment and no longer had the headache pain.  I did make a shelf strap for patient to start using as compression.  He has had a good reduction in the neck since his initial treatment.  He is interested in the Flexitouch pump and signed the consent form.     PLAN: Cont with MLD for pain relief as able. Will pursue the Flexitouch pump.     SIGNATURE: Kavya Dumont PTA, [facility] [place] License #: [id number]  Electronically Signed on 12/2/2022        [address] [place] [address]. [address]  [phone]

## 2022-12-02 NOTE — PROGRESS NOTES
Progress Note Addendum      Patient: Enrique Coronado           : 1950  Visit Date: 2022  Referring practitioner: Robert Ayers Jr*  Date of Initial Visit: Type: THERAPY  Noted: 10/27/2022  Patient seen for 4 sessions  Visit Diagnoses:    ICD-10-CM ICD-9-CM   1. Oropharyngeal dysphagia  R13.12 787.22   2. 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  I89.0 457.1     E926.9          Clinical Progress: improved  Home Program Compliance: Yes  Progress toward previous goals: Partially Met  Prognosis to achieve goals: good    Objective     Assessment & Plan     Assessment  Impairments: abnormal or restricted ROM and lacks appropriate home exercise program  Functional Limitations: uncomfortable because of pain  Assessment details:       Barriers to therapy: Stage IV cancer, radiation damage  Prognosis: good    Plan  Therapy options: will be seen for skilled therapy services  Planned therapy interventions: manual therapy, soft tissue mobilization, stretching, therapeutic activities, functional ROM exercises, home exercise program and compression  Frequency: 1x week  Duration in weeks: 12  Treatment plan discussed with: PTA        I discussed this case with  Kavya Dumont PTA SUZETTE and reviewed their progress and plan of care. The patient is in agreement with this plan.     SIGNATURE: Yamilet Ashby, PT DPT, License #: [id number]  Electronically Signed on 2022

## 2022-12-07 NOTE — PROGRESS NOTES
Physical Therapy Treatment Note  [address] [place] [address]    Patient: Enrique Coronado                                                 Visit Date: 2022  :     1950    Referring practitioner:    Robert Ayers Jr*  Date of Initial Visit:          Type: THERAPY  Noted: 10/27/2022    Patient seen for 5 sessions    Visit Diagnoses:    ICD-10-CM ICD-9-CM   1. Oropharyngeal dysphagia  R13.12 787.22   2. 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     PAIN: 6/10 with Tylenol  Pain after treatment 3/10         OBJECTIVE     Objective    Lymphedema     Row Name 22 1500             Subjective Pain    Able to rate subjective pain? yes  -HR      Pre-Treatment Pain Level 4  -HR         Manual Lymphatic Drainage    Manual Lymphatic Drainage opened regional lymph nodes;head/neck treatment;initial sequence  -HR      Initial Sequence diaphragmatic breathing  -HR      Opened Regional Lymph Nodes axillary  Sternal node  -HR      Axillary right;left  -HR      Head/Neck Treatment pre-auricular nodes;post-auricular nodes;occipital nodes;full/complex MLD;other head/neck treatment  -HR      Full/Complex MLD worked on gentle skin stretch and MLD along the entire L side of the head and neck as well as the submandibular region, the chest and SCF. Also MLD to the face.  -HR      Other Head/Neck Treatment In sitting STM to the L SCM and L upper trap  -HR      Manual Lymphatic Drainage Comments Also applied kinesiotape along upper traps with gentle stretch  -HR      Manual Therapy 45  -HR         Compression/Skin Care    Compression/Skin Care Comments He likes the strap  -HR            User Key  (r) = Recorded By, (t) = Taken By, (c) = Cosigned By    Initials Name Provider Type    HR Taty Norman  Marely, PT, DPT, [place] Physical Therapist                 Therapeutic Exercises    55722 Units Comments   Gentle stretching for neck into sidebending     Cervical rotation stretch     SCM stretch               Timed Minutes 10        Therapy Education/Self Care 12835   Education offered today Watch posture wear compression.    Medbridge Code    Ongoing HEP   He has remedial HEP that he is working on   Timed Minutes        Total Timed Treatment:     55   mins  Total Time of Visit:             55   mins         ASSESSMENT/PLAN     GOALS        Goals                                          Progress Note due by 1/1/23                                                      Recert due by 1/24/23   STG by: 6 weeks Comments Date Status   Patient will have a good basic understanding of lymphedema and its suggested risk reduction practices  Educate each treatment 12/2 Ongoing   Patient will be independent with remedial HEP for lymphedema He is working on his HEP 12/2 Ongoing   Patient will be independent with self MLD for lymphedema He has been working on the MLD. 12/2 Ongoing             LTG by: 12 weeks         Patient will have appropriate compression garments for lymphedema maintenance He will use a shelf strap for compression. 12/2 Ongoing   Patient will have no sign or symptoms of infection No sing/symptoms of infection 12/2 Ongoing   Patient will be independent with comprehensive maintenance program for lymphedema  He is interested in the Flexitouch pump 12/2 Ongoing         Assessment/Plan     ASSESSMENT:  Patient's felt looser again after treatment today. He felt like the tape might help as well. He is most worried about continuing to lose weight and not being able to get his strength back because of it.     PLAN: Cont with CDT.     SIGNATURE: Taty Norman, PT, DPT, [place], [place] License #: [id number]  Electronically Signed on 12/7/2022        [address] [place] [address]. [address]  [phone]

## 2022-12-13 NOTE — PROGRESS NOTES
Speech Language Pathology Treatment Note  [address] [place] [address]    Patient: Enrique Coronado                                                                                     Visit Date: 2022  :     1950    Referring practitioner:    Robert Ayers Jr*  Date of Initial Visit:          Type: THERAPY  Noted: 2022    Patient seen for 7 sessions    Visit Diagnoses:    ICD-10-CM ICD-9-CM   1. Oropharyngeal dysphagia  R13.12 787.22   2. 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 He stated that he has a sore throat and a headache. He denies fever or myalgias. He has not been able to do his exercises very much at all due to throat pain and mainly due to shortness of breath.     OBJECTIVE   GOALS  Goals                                            STG  Comments Status   Patient will improve oral skills to enhance safety and increase eating efficiency and bolus control as measured by improved bolus formation and improved posterior propulsion of the bolus.  Doing tongue press exercises at home with no problems reported. Cued to rest and take breaths as needed when exercising.  Ongoing   Patient will improve hyolaryngeal elevation, improve laryngeal closure, improve UES opening, increase base of the tongue strength and posterior pharyngeal wall excursion and increase efficiency of cough to clear residue from the airway as measured by decreased overt signs and symptoms of aspiration and decreased penetration and aspiration on repeat instrumental swallow study, if applicable.   Patient able to demonstrate 20 effortful swallows with rests between each swallow to breathe and 1min rest after each 5 swallows. He continues with shortness of breath and sinus drainage. He stated that he is trying to do his EMST but can only do a few reps before he gets too short of  breath. He continues to have difficulty clearing phlegm. Wheezing noted during exercises and pt c/o difficulty drawing breath.  Ongoing   Patient will report decreased difficulty with swallowing and improved EAT-10 score.  Ongoing, patient continues to have significant difficulty. Not able to swallow anything other than a few small sips per day. He is loosing weight despite gtube.  Ongoing           LTG        Patient will safely consume some PO diet without aspiration or pneumonia while maintaining nutrition/hydration via alternate means.  Patient is highly motivated to improve. Exercises ongoing. He is having difficulty with exercises due to SOB and phlegm. He has lost weight despite PEG due to diarrhea.  Ongoing         Therapy Education/Self Care    Details: POC   Given [employer] HEP (access code Access Code: [email or web address])   Progress: Reinforced   Education provided to:  Patient   Level of understanding Verbalized             Total Time of Visit:             25   mins         ASSESSMENT/PLAN     ASSESSMENT: Patient having increasing difficulty with completing his exercises due to shortness of breath. He was able to complete 20 swallows today with breaks for breathing between each swallow and 1 min rest between each set of 5 swallows. He has lost weight and feels that he is getting weaker. He is having more trouble clearing phlegm.   PLAN: Continue therapy every other week and continue home exercises as able. Patient may benefit from referral to GI as well as Pulmonology. He stated that he asked his PCP about this but they deferred to Dr. Ayers, patient has not contacted Dr. Ayers but does have follow up scheduled with him for January. He stated that he bought a humidifier and that is helping him sleep some.      Progress Note Due Date: 1/12/2023  Recertification Due Date: 3/12/2023  SIGNATURE: Ana Luisa Plata, CCC-SLP, [place] License #: [id number]  Electronically Signed on 12/13/2022      Clinical Progress:  unchanged  Home Program Compliance: Yes  Progress toward previous goals: Not Met      90 Day Recertification  Certification Period: 12/13/2022 through 3/12/2023  I certify that the therapy services are furnished while this patient is under my care.  The services outlined above are required by this patient, and will be reviewed every 90 days.     PHYSICIAN: Robert Ayers Jr., MD (NPI: [id number])    Signature:___________________________________________DATE: _________    Please sign and return via fax to [phone].   Thank you so much for letting us work with Enrique. I appreciate your letting us work with your patients. If you have any questions or concerns, please don't hesitate to contact me.                [address] [address]  [phone]

## 2022-12-13 NOTE — PROGRESS NOTES
Physical Therapy Treatment Note  [address] [place] [address]    Patient: Enrique Coronado                                                 Visit Date: 2022  :     1950    Referring practitioner:    Robert Ayers Jr*  Date of Initial Visit:          Type: THERAPY  Noted: 10/27/2022    Patient seen for 6 sessions    Visit Diagnoses:    ICD-10-CM ICD-9-CM   1. Oropharyngeal dysphagia  R13.12 787.22   2. 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  He will have CT scan next week. Patient has been working on the MLD     PAIN: 7/10 with Tylenol  Pain after treatment 2/10         OBJECTIVE     Objective    Lymphedema     Row Name 22 0945             Subjective Pain    Able to rate subjective pain? yes  -AL      Pre-Treatment Pain Level 7  -AL      Subjective Pain Comment Headache with Tylenol  -AL         Manual Lymphatic Drainage    Manual Lymphatic Drainage opened regional lymph nodes;head/neck treatment;initial sequence  -AL      Initial Sequence diaphragmatic breathing  -AL      Opened Regional Lymph Nodes axillary  Sternal node  -AL      Axillary right;left  -AL      Head/Neck Treatment pre-auricular nodes;post-auricular nodes;occipital nodes;full/complex MLD;other head/neck treatment  -AL      Full/Complex MLD worked on gentle skin stretch and MLD along the entire L side of the head and neck as well as the submandibular region, the chest and SCF. Also MLD to the face.  -AL      Other Head/Neck Treatment In sitting STM to the L SCM and B upper traps  -AL      Manual Therapy 45  -AL            User Key  (r) = Recorded By, (t) = Taken By, (c) = Cosigned By    Initials Name Provider Type    AL Kavya Dumont, PTA, CLT-[facility] Physical Therapist Assistant                 Therapeutic Exercises    59130 Units Comments   Gentle  stretching for neck into Sidebending     Cervical rotation stretch     SCM stretch     With towel roll along spine in sitting B pec stretch          Timed Minutes 10        Therapy Education/Self Care 89532   Education offered today Watch posture wear compression.    [employer] Code    Ongoing HEP   He has remedial HEP that he is working on   Timed Minutes        Total Timed Treatment:     55   mins  Total Time of Visit:             55   mins         ASSESSMENT/PLAN     GOALS        Goals                                          Progress Note due by 1/1/23                                                      Recert due by 1/24/23   STG by: 6 weeks Comments Date Status   Patient will have a good basic understanding of lymphedema and its suggested risk reduction practices  Educate each treatment 12/2 Ongoing   Patient will be independent with remedial HEP for lymphedema He is working on his HEP 12/2 Ongoing   Patient will be independent with self MLD for lymphedema He is compliant with the MLD. 12/3 Met             LTG by: 12 weeks         Patient will have appropriate compression garments for lymphedema maintenance He will use a shelf strap for compression. 12/2 Ongoing   Patient will have no sign or symptoms of infection No sing/symptoms of infection 12/2 Ongoing   Patient will be independent with comprehensive maintenance program for lymphedema We are working on getting a Flexitouch pump for patient.  12/3 Ongoing         Assessment/Plan     ASSESSMENT:  Patient is happy he is able to get some relief from the treatments with us.  He has been working on MLD to the neck, the swelling has decreased in this area and the tissue is much softer.  He still has headaches that he rates at a high pain level.  He is still concerned about loosing so much weight and decrease endurance.      PLAN: Cont with CDT.     SIGNATURE: Kavya Dumont PTA, [facility] License #: [id number]  Electronically Signed on 12/13/2022        [place] [address]  [phone]

## 2022-12-27 NOTE — PROGRESS NOTES
Physical Therapy Treatment Note and 30 Day Progress Note  [address] [place] [address]    Patient: Enrique Coronado                                                 Visit Date: 2022  :     1950    Referring practitioner:    Robert Ayers Jr*  Date of Initial Visit:          Type: THERAPY  Noted: 10/27/2022    Patient seen for 7 sessions    Visit Diagnoses:    ICD-10-CM ICD-9-CM   1. 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 His neck is not as tight.  He can't get much food down it just comes back up.  He likes the strap and thinks it helps, he is working on the MLD and HEP. His dog knocked him down the other day.     PAIN: No pain today         OBJECTIVE     Objective    Lymphedema     Row Name 22 1400             Subjective Pain    Able to rate subjective pain? yes  -AL      Pre-Treatment Pain Level 0  -AL         Lymphedema Measurements    Measurement Type(s) Circumferential  -AL      Circumferential Areas Neck  -AL         Neck Circumferential (cm)    Measurement Location 1 6 cm from base of chin  -AL      Neck 1 34 cm  -AL      Neck Circumferential Total 34 cm  -AL         Manual Lymphatic Drainage    Manual Lymphatic Drainage opened regional lymph nodes;head/neck treatment;initial sequence  -AL      Initial Sequence diaphragmatic breathing  -AL      Opened Regional Lymph Nodes axillary  Sternal node  -AL      Axillary right;left  -AL      Head/Neck Treatment pre-auricular nodes;post-auricular nodes;occipital nodes;full/complex MLD;other head/neck treatment  -AL      Full/Complex MLD MLD along the entire B sides of the head and neck as well as the submandibular region, the chest and SCF. Also MLD to the face.  -AL      Other Head/Neck  Treatment In sitting STM to the L SCM and B upper traps  -AL      Manual Therapy 50  -AL         Compression/Skin Care    Compression/Skin Care Comments Made a chip bag to use under the shelf strap.  -AL            User Key  (r) = Recorded By, (t) = Taken By, (c) = Cosigned By    Initials Name Provider Type    Kavya Marrufo, PTA, [facility] Physical Therapist Assistant                 Therapeutic Exercises    20382 Units Comments   Gentle stretching for neck into Sidebending     Cervical rotation stretch     With and without towel roll along spine in sitting B pec stretch          Timed Minutes 10        Therapy Education/Self Care 91998   Education offered today Watch posture wear chip bag with compression.    [employer] Code    Ongoing HEP   He has remedial HEP that he is working on   Timed Minutes        Total Timed Treatment:     60   mins  Total Time of Visit:             60   mins         ASSESSMENT/PLAN     GOALS        Goals                                          Progress Note due by 1/26/23                                                      Recert due by 1/24/23   STG by: 6 weeks Comments Date Status   Patient will have a good basic understanding of lymphedema and its suggested risk reduction practices  Educate each treatment 12/27 Ongoing   Patient will be independent with remedial HEP for lymphedema He is working on his HEP 12/27 Ongoing   Patient will be independent with self MLD for lymphedema He is compliant with the MLD. 12/3 Met             LTG by: 12 weeks         Patient will have appropriate compression garments for lymphedema maintenance He will use a shelf strap for compression, he will add the foam chip bag. 12/27 Ongoing   Patient will have no sign or symptoms of infection No sing/symptoms of infection 12/2 Ongoing   Patient will be independent with comprehensive maintenance program for lymphedema We are working on getting a Flexitouch pump for patient.  12/27 Ongoing          Assessment/Plan     ASSESSMENT:  Patient is compliant with CDT, he is hoping he will be able to get a Flexitouch pump.  He will start using the chip bag with the shelf strap.  Patient is not having any headache pain today which is great progress for him.  He is still not able to gain weight with the food for the feeding tube.     PLAN: Cont with CDT.     SIGNATURE: Kavya Dumont PTA, [facility] [place] License #: [id number]  Electronically Signed on 12/27/2022        [address] [place] [address]. [address]  [phone]

## 2023-01-09 ENCOUNTER — TELEPHONE (OUTPATIENT)
Dept: PHYSICAL THERAPY | Facility: CLINIC | Age: 73
End: 2023-01-09
Payer: MEDICARE

## 2023-01-09 NOTE — TELEPHONE ENCOUNTER
Caller: CARLINE ARAYA    Relationship: Emergency Contact         What was the call regarding:DAUGHTER CALLED AND SAID HE PASSED OVER THE WEEKEND

## 2023-01-24 ENCOUNTER — TELEPHONE (OUTPATIENT)
Dept: OTOLARYNGOLOGY | Facility: CLINIC | Age: 73
End: 2023-01-24

## 2023-01-24 NOTE — TELEPHONE ENCOUNTER
DELETE AFTER REVIEWING: Telephone encounter to be sent to the clinical pool     “Please be informed that patient has passed. Patient has been marked  in the system. The date of death is: 2023    Caller: CARLINE ARAYA    Relationship: Emergency Contact    Best call back number: [phone]

## 2023-02-03 ENCOUNTER — TELEPHONE (OUTPATIENT)
Dept: ONCOLOGY | Facility: CLINIC | Age: 73
End: 2023-02-03
Payer: MEDICARE

## 2023-02-03 NOTE — TELEPHONE ENCOUNTER
Caller: CARLINE ARAYA    Relationship: Emergency Contact    Best call back number: [phone]        Who are you requesting to speak with (clinical staff, provider,  specific staff member): CLINICAL      What was the call regarding: PT DAUGHTER CALLED TO SPEAK TO DR LORENZO REGARDING THE TYPE OF CANCER PATIENT HAD    Do you require a callback: YES

## 2023-02-13 NOTE — TELEPHONE ENCOUNTER
Called and spoke with Regulo. I let her know that at the last appointment when the patient was seen he was stable for observation. He was also under the care of Dr. Bird and Dr. Ayers. She reports that he was having some headaches and gas near the end of his life and she asks if that was from the cancer. I dicussed with Dr. Rubin and let her know that it is hard to say if those symptoms were from the cancer or not.

## 2023-02-13 NOTE — TELEPHONE ENCOUNTER
Caller: CARLINE ARAYA    Relationship: Emergency Contact    Best call back number: [phone]    What is the best time to reach you: ASAP    Who are you requesting to speak with (clinical staff, provider,  specific staff member): DR LORENZO/NURSE    What was the call regarding: PT'S DAUGHTER CALLED CHECKING BACK ON THIS, PLEASE CALL BACK WHEN AVAILABLE, SHE HAS SOME QUESTIONS ABOUT THE TYPE OF CANCER HER FATHER HAD.    Do you require a callback: YES
